# Patient Record
Sex: FEMALE | Race: WHITE | Employment: FULL TIME | URBAN - METROPOLITAN AREA
[De-identification: names, ages, dates, MRNs, and addresses within clinical notes are randomized per-mention and may not be internally consistent; named-entity substitution may affect disease eponyms.]

---

## 2017-03-24 ENCOUNTER — OFFICE VISIT (OUTPATIENT)
Dept: INTERNAL MEDICINE CLINIC | Facility: CLINIC | Age: 37
End: 2017-03-24

## 2017-03-24 VITALS
WEIGHT: 213 LBS | HEIGHT: 64 IN | SYSTOLIC BLOOD PRESSURE: 110 MMHG | DIASTOLIC BLOOD PRESSURE: 80 MMHG | HEART RATE: 82 BPM | TEMPERATURE: 97 F | BODY MASS INDEX: 36.37 KG/M2 | OXYGEN SATURATION: 98 %

## 2017-03-24 DIAGNOSIS — B02.9 HERPES ZOSTER WITHOUT COMPLICATION: Primary | ICD-10-CM

## 2017-03-24 PROCEDURE — 99213 OFFICE O/P EST LOW 20 MIN: CPT | Performed by: PHYSICIAN ASSISTANT

## 2017-03-24 RX ORDER — ACYCLOVIR 800 MG/1
800 TABLET ORAL 2 TIMES DAILY
Qty: 28 TABLET | Refills: 0 | Status: SHIPPED | OUTPATIENT
Start: 2017-03-24 | End: 2017-04-07

## 2017-03-24 NOTE — PROGRESS NOTES
Ave Mazariegos is a 39year old female  Patient presents with:  Rash      HPI:   Pt states that yesterday her  noticed rash on left side, bra line  - states after noticing it became itchy, tender with palpation; otherwise not sore  - denies  ibuprofen for pain  - advised to keep lesions clean covered, continue to monitor  - to call if symptoms change, worsen or any questions/concerns arise. No orders of the defined types were placed in this encounter.        Meds & Refills for this Visit:  Si

## 2017-04-10 ENCOUNTER — OFFICE VISIT (OUTPATIENT)
Dept: INTERNAL MEDICINE CLINIC | Facility: CLINIC | Age: 37
End: 2017-04-10

## 2017-04-10 VITALS
RESPIRATION RATE: 16 BRPM | HEART RATE: 68 BPM | BODY MASS INDEX: 36.88 KG/M2 | DIASTOLIC BLOOD PRESSURE: 64 MMHG | HEIGHT: 64 IN | WEIGHT: 216 LBS | SYSTOLIC BLOOD PRESSURE: 118 MMHG

## 2017-04-10 DIAGNOSIS — B02.9 HERPES ZOSTER WITHOUT COMPLICATION: Primary | ICD-10-CM

## 2017-04-10 PROCEDURE — 99213 OFFICE O/P EST LOW 20 MIN: CPT | Performed by: PHYSICIAN ASSISTANT

## 2017-04-10 NOTE — PROGRESS NOTES
Manuela Client is a 39year old female  Patient presents with:   Follow - Up: shingles      HPI:   Pt here today for f/u, recently diagnosed and treated for shingles  - states she has completed therapy and rash has dried up; denies pain, drainage, den Consults:  None    No Follow-up on file. There are no Patient Instructions on file for this visit. The patient indicates understanding of these issues and agrees to the plan.

## 2017-05-22 ENCOUNTER — TELEPHONE (OUTPATIENT)
Dept: INTERNAL MEDICINE CLINIC | Facility: CLINIC | Age: 37
End: 2017-05-22

## 2017-05-22 NOTE — TELEPHONE ENCOUNTER
Left msg on cell of recommendation to make office visit and phone number given. No other details given on message. Please follow up that she has done this.

## 2017-05-22 NOTE — TELEPHONE ENCOUNTER
Patient phoned and would like a call back re getting started on some anti-anxiety medication.   She states that when she last saw Teto Villa in follow-up to Shingles, which she states was thought to be stress-related, Krystal mentioned getting started on some anti

## 2017-05-22 NOTE — TELEPHONE ENCOUNTER
Spoke with pt, extremely anxious. Worried about losing her house. Feels nauseous all the time, cannot sleep, feels jittery, not hungry, when she drinks anything feels \"like acid\", headaches.  When asked if she had any thoughts of harming herself, she vincenzo

## 2017-05-23 NOTE — TELEPHONE ENCOUNTER
Haroldo for pt following up. She has not yet made an OV to discuss her anxiety symptoms. Strongly advise she come into office to discuss. Please follow up.

## 2017-05-24 NOTE — TELEPHONE ENCOUNTER
Spoke with pt. Scheduled appt 6/2/17 for further evaluation of symptoms. Pt states that she cannot come in any sooner due to work schedule.

## 2017-05-24 NOTE — TELEPHONE ENCOUNTER
Left message on pt's mobile voice mail stating that pt strongly advised to make appt for symptoms. Pt asked to call back to make appt.

## 2017-06-02 PROBLEM — F41.9 ANXIETY: Status: ACTIVE | Noted: 2017-06-02

## 2017-06-02 NOTE — PROGRESS NOTES
Percy Escalante is a 39year old female  Patient presents with:   Anxiety      HPI:   Pt here today to discuss anxiety    - pt states she is starting to get really stressed out and getting in the way of her daily life  - things that stress her out she 64\"  Wt 216 lb  BMI 37.06 kg/m2  SpO2 99%  LMP 05/02/2017  GENERAL: well developed, well nourished,in no apparent distress  SKIN: no rashes,no suspicious lesions  HEENT: atraumatic, normocephalic,ears and throat are clear, no pallor or icterus  NECK: supp

## 2017-06-11 ENCOUNTER — OFFICE VISIT (OUTPATIENT)
Dept: FAMILY MEDICINE CLINIC | Facility: CLINIC | Age: 37
End: 2017-06-11

## 2017-06-11 VITALS
HEIGHT: 66 IN | WEIGHT: 216 LBS | TEMPERATURE: 98 F | RESPIRATION RATE: 20 BRPM | HEART RATE: 65 BPM | SYSTOLIC BLOOD PRESSURE: 118 MMHG | OXYGEN SATURATION: 99 % | DIASTOLIC BLOOD PRESSURE: 84 MMHG | BODY MASS INDEX: 34.72 KG/M2
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
## 2017-06-11 DIAGNOSIS — S40.862A TICK BITE OF UPPER ARM, LEFT, INITIAL ENCOUNTER: Primary | ICD-10-CM

## 2017-06-11 DIAGNOSIS — L03.114 CELLULITIS OF LEFT UPPER EXTREMITY: ICD-10-CM

## 2017-06-11 PROCEDURE — 99213 OFFICE O/P EST LOW 20 MIN: CPT | Performed by: NURSE PRACTITIONER

## 2017-06-11 RX ORDER — DOXYCYCLINE HYCLATE 100 MG/1
100 CAPSULE ORAL 2 TIMES DAILY
Qty: 20 CAPSULE | Refills: 0 | Status: SHIPPED | OUTPATIENT
Start: 2017-06-11 | End: 2017-06-21

## 2017-06-11 NOTE — PROGRESS NOTES
CHIEF COMPLAINT:   Patient presents with:  Tick: s/s for 2 days, pt removed 1 tick from site  Redness: 2 bites at Left upper arm  Swelling       HPI:   Alex Pineda is a 39year old female who presents for evaluation of tick bite sites.   Per patien Alcohol Use: Yes           0.0 - 0.5 oz/week       0-1 Standard drinks or equivalent per week       Comment: 1-2 drinks every 2 weeks        REVIEW OF SYSTEMS:   GENERAL: feels well otherwise, no fever, no chills. SKIN: Per HPI. No edema.  No ulcerations PLAN: Meds as listed below. Comfort measures as described in Patient Instructions. Skin care discussed with patient.      Meds & Refills for this Visit:    Signed Prescriptions Disp Refills    Doxycycline Hyclate 100 MG Oral Cap 20 capsule 0      Sig: Lg · Your doctor may prescribe antibiotics to reduce your risk of getting Lyme disease. It is very important that you take them exactly as directed until they are completely finished. Follow-up care  Follow up with your healthcare provider, or as advised.   C Not all ticks carry disease. And a tick must remain attached for at least 24 hours to infect you. If you find a tick, don't panic. Try to carefully remove it with tweezers. Grasp the insect near its head and pull without twisting.  If you can't easily dislo

## 2017-07-05 NOTE — TELEPHONE ENCOUNTER
Last OV pertinent to medication: 6/2/2017  Last refill date: 6/2/2017     #/refills: 30/0  When pt was asked to return for OV: 4 weeks  Upcoming appt/reason: 7/7/2017 - PE

## 2017-07-07 ENCOUNTER — OFFICE VISIT (OUTPATIENT)
Dept: INTERNAL MEDICINE CLINIC | Facility: CLINIC | Age: 37
End: 2017-07-07

## 2017-07-07 VITALS
HEART RATE: 81 BPM | RESPIRATION RATE: 12 BRPM | DIASTOLIC BLOOD PRESSURE: 70 MMHG | HEIGHT: 64.5 IN | OXYGEN SATURATION: 98 % | WEIGHT: 218 LBS | SYSTOLIC BLOOD PRESSURE: 100 MMHG | BODY MASS INDEX: 36.76 KG/M2

## 2017-07-07 DIAGNOSIS — F41.9 ANXIETY: ICD-10-CM

## 2017-07-07 DIAGNOSIS — Z00.00 ANNUAL PHYSICAL EXAM: Primary | ICD-10-CM

## 2017-07-07 PROCEDURE — 99395 PREV VISIT EST AGE 18-39: CPT | Performed by: PHYSICIAN ASSISTANT

## 2017-07-07 RX ORDER — ALPRAZOLAM 0.25 MG/1
TABLET ORAL
Qty: 30 TABLET | Refills: 2 | Status: SHIPPED | OUTPATIENT
Start: 2017-07-07 | End: 2018-03-12

## 2017-07-07 NOTE — PROGRESS NOTES
Juvenal Muniz is a 39year old female who presents for a complete physical exam.   HPI:   Juvenal Muniz is a 39year old female who presents for a complete physical exam.     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   Exercise:active  Last colonoscopy none Last Pap Smear 2011 Last Mammogram 2011      REVIEW OF SYSTEMS:   GENERAL: feels well otherwise, marco visit.    Latest known visit with results is:   Office Visit on 03/03/2015   Component Date Value Ref Range Status   • STREP GRP A CUL-SCR 03/03/2015 negative  Negative Final   • Control Line Present with a clear * 03/03/2015 yes  Yes/No Final   • Kit Lot #

## 2017-08-09 ENCOUNTER — OFFICE VISIT (OUTPATIENT)
Dept: FAMILY MEDICINE CLINIC | Facility: CLINIC | Age: 37
End: 2017-08-09

## 2017-08-09 VITALS
TEMPERATURE: 98 F | HEIGHT: 64 IN | DIASTOLIC BLOOD PRESSURE: 84 MMHG | HEART RATE: 82 BPM | RESPIRATION RATE: 16 BRPM | SYSTOLIC BLOOD PRESSURE: 124 MMHG | WEIGHT: 214 LBS | BODY MASS INDEX: 36.54 KG/M2

## 2017-08-09 DIAGNOSIS — R20.2 PARESTHESIA OF BOTH LOWER EXTREMITIES: ICD-10-CM

## 2017-08-09 DIAGNOSIS — G89.29 CHRONIC BILATERAL LOW BACK PAIN WITH RIGHT-SIDED SCIATICA: Primary | ICD-10-CM

## 2017-08-09 DIAGNOSIS — M54.16 LUMBAR RADICULOPATHY: ICD-10-CM

## 2017-08-09 DIAGNOSIS — R29.2 DECREASED RIGHT PATELLAR REFLEX: ICD-10-CM

## 2017-08-09 DIAGNOSIS — M54.41 CHRONIC BILATERAL LOW BACK PAIN WITH RIGHT-SIDED SCIATICA: Primary | ICD-10-CM

## 2017-08-09 PROCEDURE — 99213 OFFICE O/P EST LOW 20 MIN: CPT | Performed by: PHYSICIAN ASSISTANT

## 2017-08-09 RX ORDER — PREDNISONE 20 MG/1
TABLET ORAL
Qty: 10 TABLET | Refills: 0 | Status: SHIPPED | OUTPATIENT
Start: 2017-08-09 | End: 2017-09-06 | Stop reason: ALTCHOICE

## 2017-08-09 RX ORDER — CYCLOBENZAPRINE HCL 5 MG
5 TABLET ORAL NIGHTLY
Qty: 10 TABLET | Refills: 0 | Status: SHIPPED | OUTPATIENT
Start: 2017-08-09 | End: 2017-09-06 | Stop reason: ALTCHOICE

## 2017-08-09 NOTE — PROGRESS NOTES
HPI:   May Urena is a 39year old female here for complaints of back pain. She is a new patient to the practice. Pain is located at low back, right worse than left. Pain is described as sharp, feels like a pinched nerve. Severity is moderate.  The pain r teacher.      REVIEW OF SYSTEMS:   GENERAL: feels well otherwise  SKIN: denies rash  LUNGS: denies shortness of breath or cough  CV: denies chest pain or syncopal episodes  GI: denies heartburn, abdominal pain, or change in bowel habits  : denies dysuria, consult. - predniSONE 20 MG Oral Tab; Take 2 tablets daily with food for 5 days. Dispense: 10 tablet; Refill: 0  - Cyclobenzaprine HCl 5 MG Oral Tab; Take 1 tablet (5 mg total) by mouth nightly. Do not take with alprazolam.  Dispense: 10 tablet;  Refill:

## 2017-08-14 ENCOUNTER — TELEPHONE (OUTPATIENT)
Dept: FAMILY MEDICINE CLINIC | Facility: CLINIC | Age: 37
End: 2017-08-14

## 2017-08-14 RX ORDER — NAPROXEN 500 MG/1
500 TABLET ORAL 2 TIMES DAILY WITH MEALS
Qty: 30 TABLET | Refills: 0 | Status: SHIPPED | OUTPATIENT
Start: 2017-08-14 | End: 2017-09-06 | Stop reason: ALTCHOICE

## 2017-08-14 NOTE — TELEPHONE ENCOUNTER
Pt called to request a refill for her prednisone medication, pt ran out she only got it for 5 days, pt is supposed to have an MRI but is not approved yet, so pt needs more medication. Do you want to continue prednisone?

## 2017-08-14 NOTE — TELEPHONE ENCOUNTER
Pt called to request a refill for her prednisone medication, pt ran out she only got it for 5 days, pt is supposed to have an MRI but is not approved yet, so pt needs more medication. Please call pt and advise.

## 2017-08-14 NOTE — TELEPHONE ENCOUNTER
No further steroids. Was only to take for 5 days. Pt may take naproxen 500 mg twice daily with food. Will send rx to her pharmacy.

## 2017-08-16 ENCOUNTER — HOSPITAL ENCOUNTER (OUTPATIENT)
Dept: MRI IMAGING | Facility: HOSPITAL | Age: 37
Discharge: HOME | End: 2017-08-16
Attending: PHYSICIAN ASSISTANT
Payer: COMMERCIAL

## 2017-08-16 DIAGNOSIS — M54.16 LUMBAR RADICULOPATHY: ICD-10-CM

## 2017-08-16 DIAGNOSIS — G89.29 CHRONIC BILATERAL LOW BACK PAIN WITH RIGHT-SIDED SCIATICA: ICD-10-CM

## 2017-08-16 DIAGNOSIS — R20.2 PARESTHESIA OF BOTH LOWER EXTREMITIES: ICD-10-CM

## 2017-08-16 DIAGNOSIS — R29.2 DECREASED RIGHT PATELLAR REFLEX: ICD-10-CM

## 2017-08-16 DIAGNOSIS — M54.41 CHRONIC BILATERAL LOW BACK PAIN WITH RIGHT-SIDED SCIATICA: ICD-10-CM

## 2017-08-16 PROCEDURE — 72148 MRI LUMBAR SPINE W/O DYE: CPT | Performed by: PHYSICIAN ASSISTANT

## 2017-08-29 ENCOUNTER — HOSPITAL ENCOUNTER (OUTPATIENT)
Dept: NUCLEAR MEDICINE | Facility: HOSPITAL | Age: 37
Discharge: HOME | End: 2017-08-29
Attending: PHYSICIAN ASSISTANT
Payer: COMMERCIAL

## 2017-08-29 DIAGNOSIS — R93.89 ABNORMAL MRI: ICD-10-CM

## 2017-08-29 PROCEDURE — 78399 UNLISTED MUSCSKEL PX DX NUC: CPT | Performed by: PHYSICIAN ASSISTANT

## 2017-08-29 PROCEDURE — 78320 NM BONE SPECT WITH CT (CPT=78306/78320/78399): CPT | Performed by: PHYSICIAN ASSISTANT

## 2017-08-29 PROCEDURE — 78306 BONE IMAGING WHOLE BODY: CPT | Performed by: PHYSICIAN ASSISTANT

## 2017-09-06 ENCOUNTER — TELEPHONE (OUTPATIENT)
Dept: SURGERY | Facility: CLINIC | Age: 37
End: 2017-09-06

## 2017-09-06 ENCOUNTER — OFFICE VISIT (OUTPATIENT)
Dept: SURGERY | Facility: CLINIC | Age: 37
End: 2017-09-06

## 2017-09-06 VITALS
RESPIRATION RATE: 16 BRPM | HEIGHT: 64 IN | WEIGHT: 215 LBS | SYSTOLIC BLOOD PRESSURE: 126 MMHG | BODY MASS INDEX: 36.7 KG/M2 | HEART RATE: 88 BPM | DIASTOLIC BLOOD PRESSURE: 84 MMHG

## 2017-09-06 DIAGNOSIS — M46.1 SACROILIITIS (HCC): ICD-10-CM

## 2017-09-06 DIAGNOSIS — M51.36 DDD (DEGENERATIVE DISC DISEASE), LUMBAR: Primary | ICD-10-CM

## 2017-09-06 DIAGNOSIS — M54.16 LUMBAR RADICULOPATHY: ICD-10-CM

## 2017-09-06 PROCEDURE — 99204 OFFICE O/P NEW MOD 45 MIN: CPT | Performed by: NURSE PRACTITIONER

## 2017-09-06 NOTE — PROGRESS NOTES
Location of Pain: Lower back pain with radiation down bilateral legs. Leg pain is posterior and lateral to knees only. N/T to both legs into feet with walking.  No B/B dysfunction, no weakness noted    Date Pain Began: Chronic, getting progressively worse

## 2017-09-06 NOTE — PATIENT INSTRUCTIONS
Refill policies:    • Allow 2-3 business days for refills; controlled substances may take longer.   • Contact your pharmacy at least 5 days prior to running out of medication and have them send an electronic request or submit request through the [facility] have a procedure or additional testing performed. [facility]) will contact your insurance carrier to obtain pre-certification or prior authorization.     Unfortunately, [facility] has seen an increase in denial of payment even though the p

## 2017-09-06 NOTE — H&P
NEUROSURGERY CLINIC VISIT      [address] [place] [address]  10/14/1980      Patient presents with:  Low Back Pain        HPI:   Hazel Guillaume is a 39year old female PMH anxiety, neck pain, RLS ALPRAZOLAM 0.25 MG Oral Tab TAKE 1 TABLET BY MOUTH EVERY 6 HOURS AS NEEDED FOR SLEEP OR ANXIETY Disp: 30 tablet Rfl: 2   SERTRALINE HCL 50 MG Oral Tab TAKE 1 TABLET(50 MG) BY MOUTH DAILY Disp: 90 tablet Rfl: 0     Family History   Problem Relation Age of O MRI lumbar spine 8/16/17  1. Mild degenerative changes in the lumbar spine are most pronounced at the L4-5 level. There is no significant spinal canal stenosis at any level the lumbar spine.      2. Mild right neural foraminal stenosis at L4-5.     3. Indet

## 2017-09-07 ENCOUNTER — TELEPHONE (OUTPATIENT)
Dept: SURGERY | Facility: CLINIC | Age: 37
End: 2017-09-07

## 2017-09-07 NOTE — TELEPHONE ENCOUNTER
Plan per LOV 9/6/17 with DANA Glover. Follow up 6 weeks  2. Start physical therapy   3. Consider referral to pain management if not improve with physical therapy  4. Consider EMG\"    LM on personalized VM informing patient of above plan.  Pain r

## 2017-09-12 ENCOUNTER — TELEPHONE (OUTPATIENT)
Dept: SURGERY | Facility: CLINIC | Age: 37
End: 2017-09-12

## 2017-09-12 DIAGNOSIS — M54.16 LUMBAR RADICULOPATHY: ICD-10-CM

## 2017-09-12 DIAGNOSIS — M51.36 DDD (DEGENERATIVE DISC DISEASE), LUMBAR: Primary | ICD-10-CM

## 2017-09-25 ENCOUNTER — HOSPITAL ENCOUNTER (OUTPATIENT)
Dept: PHYSICAL THERAPY | Facility: HOSPITAL | Age: 37
Setting detail: THERAPIES SERIES
Discharge: HOME | End: 2017-09-25
Attending: NURSE PRACTITIONER
Payer: COMMERCIAL

## 2017-09-25 DIAGNOSIS — M54.16 LUMBAR RADICULOPATHY: ICD-10-CM

## 2017-09-25 DIAGNOSIS — M51.36 DDD (DEGENERATIVE DISC DISEASE), LUMBAR: ICD-10-CM

## 2017-09-25 PROCEDURE — 97110 THERAPEUTIC EXERCISES: CPT

## 2017-09-25 PROCEDURE — 97162 PT EVAL MOD COMPLEX 30 MIN: CPT

## 2017-09-25 NOTE — PROGRESS NOTES
SPINE EVALUATION:   Referring Physician: Dr. Ailyn Bueno  Diagnosis: DDD     Date of Service: 9/25/2017     PATIENT SUMMARY   Jolie Diaz is a 39year old y/o female who presents to therapy today with complaints of LBP.  About 5 years ago, had first in symmetrically, +2 B LE's DTR's    Lumbar ROM:   Flexion: 130 - vaelriano's sign  Extension: 30 -  harder  Sidebending: R 35; L 35  Rotation: R 60; L 60    Accessory motion: decreased CPA L4-5 with pain  Palpation: tenderness R SI joint    Strength:   LE   Hip minutes for work and home activities (8 visits)  · Pt will demonstrate improved core strength to be able to perform lifting child with <2/10 pain and maintain pelvic symmetry (8 visits)  · Pt will be independent and compliant with comprehensive HEP to main

## 2017-10-02 ENCOUNTER — HOSPITAL ENCOUNTER (OUTPATIENT)
Dept: PHYSICAL THERAPY | Facility: HOSPITAL | Age: 37
Setting detail: THERAPIES SERIES
Discharge: HOME | End: 2017-10-02
Attending: NURSE PRACTITIONER
Payer: COMMERCIAL

## 2017-10-02 DIAGNOSIS — M51.36 DDD (DEGENERATIVE DISC DISEASE), LUMBAR: ICD-10-CM

## 2017-10-02 DIAGNOSIS — M54.16 LUMBAR RADICULOPATHY: ICD-10-CM

## 2017-10-02 PROCEDURE — 97110 THERAPEUTIC EXERCISES: CPT

## 2017-10-02 PROCEDURE — 97140 MANUAL THERAPY 1/> REGIONS: CPT

## 2017-10-02 NOTE — PROGRESS NOTES
Dx: DDD lumbar spine         Authorized # of Visits:  8         Next MD visit: none scheduled  Fall Risk: standard         Precautions: n/a             Subjective: Pain 2/10. Doing HEP. Objective:     Date: 10/2/2017  Tx#: 2/8 Date: Tx#: 3/ Date:    Tx

## 2017-10-04 ENCOUNTER — TELEPHONE (OUTPATIENT)
Dept: SURGERY | Facility: CLINIC | Age: 37
End: 2017-10-04

## 2017-10-09 ENCOUNTER — HOSPITAL ENCOUNTER (OUTPATIENT)
Dept: PHYSICAL THERAPY | Facility: HOSPITAL | Age: 37
Setting detail: THERAPIES SERIES
Discharge: HOME | End: 2017-10-09
Attending: NURSE PRACTITIONER
Payer: COMMERCIAL

## 2017-10-09 DIAGNOSIS — M54.16 LUMBAR RADICULOPATHY: ICD-10-CM

## 2017-10-09 DIAGNOSIS — M51.36 DDD (DEGENERATIVE DISC DISEASE), LUMBAR: ICD-10-CM

## 2017-10-09 PROCEDURE — 97110 THERAPEUTIC EXERCISES: CPT

## 2017-10-09 PROCEDURE — 97140 MANUAL THERAPY 1/> REGIONS: CPT

## 2017-10-09 NOTE — PROGRESS NOTES
Dx: DDD lumbar spine         Authorized # of Visits:  8         Next MD visit: none scheduled  Fall Risk: standard         Precautions: n/a             Subjective: Pain 2/10.  Did a lot over the weekend-stood in long period of time, laundry-carrying vacuum

## 2017-10-16 ENCOUNTER — HOSPITAL ENCOUNTER (OUTPATIENT)
Dept: PHYSICAL THERAPY | Facility: HOSPITAL | Age: 37
Setting detail: THERAPIES SERIES
Discharge: HOME | End: 2017-10-16
Attending: NURSE PRACTITIONER
Payer: COMMERCIAL

## 2017-10-16 DIAGNOSIS — M51.36 DDD (DEGENERATIVE DISC DISEASE), LUMBAR: ICD-10-CM

## 2017-10-16 DIAGNOSIS — M54.16 LUMBAR RADICULOPATHY: ICD-10-CM

## 2017-10-16 PROCEDURE — 97140 MANUAL THERAPY 1/> REGIONS: CPT

## 2017-10-16 PROCEDURE — 97110 THERAPEUTIC EXERCISES: CPT

## 2017-10-16 NOTE — PROGRESS NOTES
Dx: DDD lumbar spine         Authorized # of Visits:  8         Next MD visit: none scheduled  Fall Risk: standard         Precautions: n/a             Subjective: Pain 4/10. Back felt like it was going to go out 2x over the past week.     Objective:   10/1

## 2017-10-18 ENCOUNTER — OFFICE VISIT (OUTPATIENT)
Dept: SURGERY | Facility: CLINIC | Age: 37
End: 2017-10-18

## 2017-10-18 VITALS — SYSTOLIC BLOOD PRESSURE: 118 MMHG | DIASTOLIC BLOOD PRESSURE: 80 MMHG | HEART RATE: 100 BPM

## 2017-10-18 DIAGNOSIS — M54.16 LUMBAR RADICULOPATHY: Primary | ICD-10-CM

## 2017-10-18 DIAGNOSIS — M51.16 LUMBAR DISC HERNIATION WITH RADICULOPATHY: ICD-10-CM

## 2017-10-18 DIAGNOSIS — M53.3 SI (SACROILIAC) PAIN: ICD-10-CM

## 2017-10-18 PROCEDURE — 99213 OFFICE O/P EST LOW 20 MIN: CPT | Performed by: PHYSICIAN ASSISTANT

## 2017-10-18 RX ORDER — HYDROCODONE BITARTRATE AND ACETAMINOPHEN 5; 325 MG/1; MG/1
1 TABLET ORAL EVERY 8 HOURS PRN
Qty: 45 TABLET | Refills: 0 | Status: SHIPPED | OUTPATIENT
Start: 2017-10-18 | End: 2019-11-26

## 2017-10-18 NOTE — PROGRESS NOTES
Pt is here for follow up regarding lumbar pain, currently 3/10, has had PT which has helped, but not significantly. Pain has been fluctuating from 3-10/10.

## 2017-10-18 NOTE — PATIENT INSTRUCTIONS
Refill policies:    • Allow 2-3 business days for refills; controlled substances may take longer.   • Contact your pharmacy at least 5 days prior to running out of medication and have them send an electronic request or submit request through the [facility] have a procedure or additional testing performed. [facility]) will contact your insurance carrier to obtain pre-certification or prior authorization.     Unfortunately, [facility] has seen an increase in denial of payment even though the p

## 2017-10-18 NOTE — PROGRESS NOTES
NEUROSURGERY CLINIC VISIT     Carmela Ledezma  10/14/1980     HPI:   Risa Barragan is a 39year old female here in follow-up for back pain. She denies any leg pain. Denies any numbness and tingling in the legs.   She has back pain on and off f • RLS (restless legs syndrome)     • Screening, lipid 11/10/2011   • Sinusitis     • [facility]       daytime      Past Surgical History:  No date: ORAL SURGERY PROCEDURE      Comment: wisdom tooth resection  4//2013: OTHER      Comment: Shira Wise Left          5        5         5          5 5  5 5          Lower extremity strength:      Iliopsoas  Hamstrings   Quads    D-flexion P-flexion Great Toe   Right       5         5       5         5 5 5   Left       5         5       5         5 5 5

## 2017-10-23 ENCOUNTER — HOSPITAL ENCOUNTER (OUTPATIENT)
Dept: PHYSICAL THERAPY | Facility: HOSPITAL | Age: 37
Setting detail: THERAPIES SERIES
Discharge: HOME | End: 2017-10-23
Attending: NURSE PRACTITIONER
Payer: COMMERCIAL

## 2017-10-23 DIAGNOSIS — M51.36 DDD (DEGENERATIVE DISC DISEASE), LUMBAR: ICD-10-CM

## 2017-10-23 DIAGNOSIS — M54.16 LUMBAR RADICULOPATHY: ICD-10-CM

## 2017-10-23 PROCEDURE — 97140 MANUAL THERAPY 1/> REGIONS: CPT

## 2017-10-23 PROCEDURE — 97110 THERAPEUTIC EXERCISES: CPT

## 2017-10-23 NOTE — PROGRESS NOTES
Dx: DDD lumbar spine         Authorized # of Visits:  8         Next MD visit: none scheduled  Fall Risk: standard         Precautions: n/a             Subjective: Pain 4/10. Therapy is helping but still has constant low grade pain sleeps prone.     Abelardo Blackman bracing  Charges: Valorie 2, MT   Total Timed Treatment: 45 min     Total Treatment Time: 45 min

## 2017-10-24 RX ORDER — ALPRAZOLAM 0.25 MG/1
TABLET ORAL
Qty: 30 TABLET | Refills: 0 | OUTPATIENT
Start: 2017-10-24

## 2017-10-26 ENCOUNTER — OFFICE VISIT (OUTPATIENT)
Dept: SURGERY | Facility: CLINIC | Age: 37
End: 2017-10-26

## 2017-10-26 VITALS
WEIGHT: 215 LBS | HEIGHT: 64 IN | HEART RATE: 77 BPM | RESPIRATION RATE: 16 BRPM | OXYGEN SATURATION: 99 % | BODY MASS INDEX: 36.7 KG/M2

## 2017-10-26 DIAGNOSIS — M54.16 LUMBAR RADICULOPATHY: Primary | ICD-10-CM

## 2017-10-26 PROCEDURE — 99204 OFFICE O/P NEW MOD 45 MIN: CPT | Performed by: ANESTHESIOLOGY

## 2017-10-26 RX ORDER — GABAPENTIN 100 MG/1
100 CAPSULE ORAL 3 TIMES DAILY
Qty: 90 CAPSULE | Refills: 3 | Status: SHIPPED | OUTPATIENT
Start: 2017-10-26 | End: 2019-11-14

## 2017-10-26 NOTE — PATIENT INSTRUCTIONS
Refill policies:    • Allow 2-3 business days for refills; controlled substances may take longer.   • Contact your pharmacy at least 5 days prior to running out of medication and have them send an electronic request or submit request through the [facility] have a procedure or additional testing performed. [facility]) will contact your insurance carrier to obtain pre-certification or prior authorization.     Unfortunately, [facility] has seen an increase in denial of payment even though the p [place]. • Please note-No prescriptions will be written by Pain Clinic in [place] on the day of procedure. If you require a refill of medications, please contact the office 48 hours prior to your procedure.   • If you have an implanted Spinal Cord or Periph up visit within 6 to 10 weeks after your last procedure date   Please call our office with any questions or concerns before or after your procedure at [phone].   If you are a diabetic, please increase the frequency of your glucose monitoring after the tolerate. Will I be \"put out\" for the transforaminal injection? This procedure is done with a local anesthetic. Some patients choose to receive intravenous sedation that can make the procedure easier to tolerate.  Patients may experience some amnesia f How many transforaminal injections do I need to have? If the first transforaminal injection does not relieve your symptoms within one week, a second injection may be recommended.  Similarly, if the second transforaminal injection does not completely reli

## 2017-10-26 NOTE — H&P
Name: Pamela Perdue   : 10/14/1980   DOS: 10/26/2017     Chief complaint: Low back pain    History of present illness:  Pamela Perdue is a 40year old female complaining of a 5 year history of low back pain.   Her pain is primarily in the a 1 TABLET(50 MG) BY MOUTH DAILY Disp: 90 tablet Rfl: 0     Past Surgical History:  No date: ORAL SURGERY PROCEDURE      Comment: wisdom tooth resection  4//2013: OTHER      Comment: d&C   Family History   Problem Relation Age of Onset   • Hypertension Ailyne diagnostic studies:     Her MRI was reviewed independently with evidence of L4-5 neuroforaminal stenosis on the right. Does have some evidence of facet arthropathy. Assessment:  Lumbar radiculopathy  (primary encounter diagnosis).       Plan:     The pa

## 2017-10-30 ENCOUNTER — HOSPITAL ENCOUNTER (OUTPATIENT)
Dept: PHYSICAL THERAPY | Facility: HOSPITAL | Age: 37
Setting detail: THERAPIES SERIES
Discharge: HOME | End: 2017-10-30
Attending: NURSE PRACTITIONER
Payer: COMMERCIAL

## 2017-10-30 DIAGNOSIS — M54.16 LUMBAR RADICULOPATHY: ICD-10-CM

## 2017-10-30 DIAGNOSIS — M51.36 DDD (DEGENERATIVE DISC DISEASE), LUMBAR: ICD-10-CM

## 2017-10-30 PROCEDURE — 97140 MANUAL THERAPY 1/> REGIONS: CPT

## 2017-10-30 PROCEDURE — 97110 THERAPEUTIC EXERCISES: CPT

## 2017-10-30 NOTE — PROGRESS NOTES
Dx: DDD lumbar spine         Authorized # of Visits:  8         Next MD visit: none scheduled  Fall Risk: standard         Precautions: n/a             Subjective: Pain 4/10. Doing muscle energy technique  at home which helps. Feeling stronger.     Objectiv abdominal brace w ball  10 sec  x10    LTR x10       Assessment: Decreased palpable R SI pain with symmetrical ilium after muscle energy technique . Focused on core strengthening with progression.       Plan: Next visit: [facility] w abdominal bracing    Charges:

## 2017-11-01 ENCOUNTER — TELEPHONE (OUTPATIENT)
Dept: SURGERY | Facility: CLINIC | Age: 37
End: 2017-11-01

## 2017-11-01 NOTE — TELEPHONE ENCOUNTER
[facility] referral #[id number] approved for codes [id number] x 3 , 93531 x 3  From 10/26/2017 - 02/01/2018

## 2017-11-01 NOTE — TELEPHONE ENCOUNTER
LM confirming upcoming procedure date 11/6/17 and arrival time 7:15am and review of instructions. Encouraged pt to call office back with any questions. Office number provided.        [address]  PRE-PROCEDURE INSTRUCTIONS WITH IV SEDATION ? 81mg 24 hours  ? Greater than 81 mg (325mg) 7 days  ? Coumadin Procedure may be cancelled if INR is elevated. ? Epidural ____ - 7 days  ? Others 5 days  ? Excedrin (with aspirin) 7 days  ? Plavix (Clopidogrel)  ? Epidural ____ - 10 days  ?  Others 7 day

## 2017-11-02 ENCOUNTER — OFFICE VISIT (OUTPATIENT)
Dept: FAMILY MEDICINE CLINIC | Facility: CLINIC | Age: 37
End: 2017-11-02

## 2017-11-02 VITALS
WEIGHT: 228 LBS | TEMPERATURE: 98 F | DIASTOLIC BLOOD PRESSURE: 76 MMHG | HEART RATE: 90 BPM | BODY MASS INDEX: 38.93 KG/M2 | RESPIRATION RATE: 16 BRPM | HEIGHT: 64 IN | SYSTOLIC BLOOD PRESSURE: 114 MMHG

## 2017-11-02 DIAGNOSIS — J02.9 SORE THROAT: Primary | ICD-10-CM

## 2017-11-02 DIAGNOSIS — R09.82 POST-NASAL DRIP: ICD-10-CM

## 2017-11-02 PROCEDURE — 87880 STREP A ASSAY W/OPTIC: CPT | Performed by: PHYSICIAN ASSISTANT

## 2017-11-02 PROCEDURE — 99213 OFFICE O/P EST LOW 20 MIN: CPT | Performed by: PHYSICIAN ASSISTANT

## 2017-11-02 NOTE — PROGRESS NOTES
HPI:   Eleanor Gardner is a 40year old female who presents for cold symptoms for  3  days. Patient reports congestion, dry cough, post nasal drip, ears feel clogged, denies fever, denies sinus pain.  Had sore throat the day before Halloween but that h /76   Pulse 90   Temp 98.4 °F (36.9 °C)   Resp 16   Ht 64\"   Wt 228 lb   LMP 10/23/2017   BMI 39.14 kg/m²   GENERAL: well developed and in no apparent distress  SKIN: warm & dry, no rash  EYES:PERRLA, conjunctiva are clear  HEENT: atraumatic, norm

## 2017-11-06 ENCOUNTER — APPOINTMENT (OUTPATIENT)
Dept: PHYSICAL THERAPY | Facility: HOSPITAL | Age: 37
End: 2017-11-06
Attending: NURSE PRACTITIONER
Payer: COMMERCIAL

## 2017-11-06 ENCOUNTER — HOSPITAL ENCOUNTER (OUTPATIENT)
Facility: HOSPITAL | Age: 37
Setting detail: HOSPITAL OUTPATIENT SURGERY
Discharge: HOME | End: 2017-11-06
Attending: ANESTHESIOLOGY | Admitting: ANESTHESIOLOGY
Payer: COMMERCIAL

## 2017-11-06 ENCOUNTER — SURGERY (OUTPATIENT)
Age: 37
End: 2017-11-06

## 2017-11-06 ENCOUNTER — APPOINTMENT (OUTPATIENT)
Dept: GENERAL RADIOLOGY | Facility: HOSPITAL | Age: 37
End: 2017-11-06
Attending: ANESTHESIOLOGY
Payer: COMMERCIAL

## 2017-11-06 VITALS
HEART RATE: 78 BPM | DIASTOLIC BLOOD PRESSURE: 80 MMHG | RESPIRATION RATE: 18 BRPM | SYSTOLIC BLOOD PRESSURE: 130 MMHG | OXYGEN SATURATION: 99 % | TEMPERATURE: 98 F

## 2017-11-06 DIAGNOSIS — M54.16 LUMBAR RADICULOPATHY: ICD-10-CM

## 2017-11-06 PROCEDURE — 3E0R33Z INTRODUCTION OF ANTI-INFLAMMATORY INTO SPINAL CANAL, PERCUTANEOUS APPROACH: ICD-10-PCS | Performed by: ANESTHESIOLOGY

## 2017-11-06 PROCEDURE — 81025 URINE PREGNANCY TEST: CPT | Performed by: ANESTHESIOLOGY

## 2017-11-06 PROCEDURE — 99152 MOD SED SAME PHYS/QHP 5/>YRS: CPT | Performed by: ANESTHESIOLOGY

## 2017-11-06 RX ORDER — ONDANSETRON 2 MG/ML
4 INJECTION INTRAMUSCULAR; INTRAVENOUS ONCE AS NEEDED
Status: CANCELLED | OUTPATIENT
Start: 2017-11-06 | End: 2017-11-06

## 2017-11-06 RX ORDER — ACETAMINOPHEN 325 MG/1
650 TABLET ORAL EVERY 6 HOURS PRN
Status: CANCELLED | OUTPATIENT
Start: 2017-11-06

## 2017-11-06 RX ORDER — 0.9 % SODIUM CHLORIDE 0.9 %
VIAL (ML) INJECTION AS NEEDED
Status: DISCONTINUED | OUTPATIENT
Start: 2017-11-06 | End: 2017-11-06 | Stop reason: HOSPADM

## 2017-11-06 RX ORDER — DEXTROSE MONOHYDRATE 25 G/50ML
50 INJECTION, SOLUTION INTRAVENOUS
Status: DISCONTINUED | OUTPATIENT
Start: 2017-11-06 | End: 2017-11-06 | Stop reason: HOSPADM

## 2017-11-06 RX ORDER — LIDOCAINE HYDROCHLORIDE 10 MG/ML
INJECTION, SOLUTION EPIDURAL; INFILTRATION; INTRACAUDAL; PERINEURAL AS NEEDED
Status: DISCONTINUED | OUTPATIENT
Start: 2017-11-06 | End: 2017-11-06 | Stop reason: HOSPADM

## 2017-11-06 RX ORDER — SODIUM CHLORIDE, SODIUM LACTATE, POTASSIUM CHLORIDE, CALCIUM CHLORIDE 600; 310; 30; 20 MG/100ML; MG/100ML; MG/100ML; MG/100ML
100 INJECTION, SOLUTION INTRAVENOUS CONTINUOUS
Status: DISCONTINUED | OUTPATIENT
Start: 2017-11-06 | End: 2017-11-06

## 2017-11-06 RX ORDER — MIDAZOLAM HYDROCHLORIDE 1 MG/ML
INJECTION INTRAMUSCULAR; INTRAVENOUS AS NEEDED
Status: DISCONTINUED | OUTPATIENT
Start: 2017-11-06 | End: 2017-11-06 | Stop reason: HOSPADM

## 2017-11-06 RX ORDER — DEXAMETHASONE SODIUM PHOSPHATE 10 MG/ML
INJECTION, SOLUTION INTRAMUSCULAR; INTRAVENOUS AS NEEDED
Status: DISCONTINUED | OUTPATIENT
Start: 2017-11-06 | End: 2017-11-06 | Stop reason: HOSPADM

## 2017-11-06 RX ORDER — DIPHENHYDRAMINE HYDROCHLORIDE 50 MG/ML
50 INJECTION INTRAMUSCULAR; INTRAVENOUS ONCE AS NEEDED
Status: CANCELLED | OUTPATIENT
Start: 2017-11-06 | End: 2017-11-06

## 2017-11-06 NOTE — H&P
History & Physical Examination    Patient Name: Dedrick Watts  MRN: [id number]  CSN: [id number]  YOB: 1980    Pre-Operative Diagnosis:  Lumbar radiculopathy [M54.16]    Present Illness: A 40year old female with low back pain is here f [facility]     daytime     Past Surgical History:  No date: ORAL SURGERY PROCEDURE      Comment: wisdom tooth resection  4//2013: OTHER      Comment: d&C  Family History   Problem Relation Age of Onset   • Hypertension Mother    • Hypertension Maternal

## 2017-11-06 NOTE — OPERATIVE REPORT
[facility]  Operative Report  2017     Sund Sic Pretkelis Patient Status:  Hospital Outpatient Surgery    10/14/1980 MRN [id number]   [facility] SURGERY Attending Mimi Alas MD   Hosp Day # 0 PCP DO Noman Morano level. The needle position was confirmed under AP and lateral fluoroscopic view. Following negative aspiration for CSF and blood, approximately 1 cc of Omnipaque 240 was injected.   An excellent contrast spread along the epidural space and the nerve root w

## 2017-11-13 ENCOUNTER — TELEPHONE (OUTPATIENT)
Dept: SURGERY | Facility: CLINIC | Age: 37
End: 2017-11-13

## 2017-11-13 NOTE — TELEPHONE ENCOUNTER
Called patient to review the following preoperative instructions. Verbalized understanding.        [address]  PRE-PROCEDURE INSTRUCTIONS WITH IV SEDATION    Appointment Date: 11/15   Arrival Time: 12:45 PM   Procedure Time: 1:45 PM     Eli ? Lovenox (Enoxaparin) 24 hours  ? Aspirin  ? 81mg 24 hours  ? Greater than 81 mg (325mg) 7 days  ? Coumadin Procedure may be cancelled if INR is elevated. ? Epidural ____ - 7 days  ? Others 5 days  ? Excedrin (with aspirin) 7 days  ?  Plavix (Clopidogrel

## 2017-11-15 ENCOUNTER — APPOINTMENT (OUTPATIENT)
Dept: GENERAL RADIOLOGY | Facility: HOSPITAL | Age: 37
End: 2017-11-15
Attending: ANESTHESIOLOGY
Payer: COMMERCIAL

## 2017-11-15 ENCOUNTER — SURGERY (OUTPATIENT)
Age: 37
End: 2017-11-15

## 2017-11-15 ENCOUNTER — HOSPITAL ENCOUNTER (OUTPATIENT)
Facility: HOSPITAL | Age: 37
Setting detail: HOSPITAL OUTPATIENT SURGERY
Discharge: HOME | End: 2017-11-15
Attending: ANESTHESIOLOGY | Admitting: ANESTHESIOLOGY
Payer: COMMERCIAL

## 2017-11-15 VITALS
OXYGEN SATURATION: 98 % | SYSTOLIC BLOOD PRESSURE: 127 MMHG | TEMPERATURE: 98 F | RESPIRATION RATE: 18 BRPM | HEART RATE: 79 BPM | DIASTOLIC BLOOD PRESSURE: 84 MMHG

## 2017-11-15 DIAGNOSIS — M54.16 LUMBAR RADICULOPATHY: ICD-10-CM

## 2017-11-15 PROCEDURE — 3E0R33Z INTRODUCTION OF ANTI-INFLAMMATORY INTO SPINAL CANAL, PERCUTANEOUS APPROACH: ICD-10-PCS | Performed by: ANESTHESIOLOGY

## 2017-11-15 PROCEDURE — 81025 URINE PREGNANCY TEST: CPT | Performed by: ANESTHESIOLOGY

## 2017-11-15 PROCEDURE — 99152 MOD SED SAME PHYS/QHP 5/>YRS: CPT | Performed by: ANESTHESIOLOGY

## 2017-11-15 RX ORDER — LIDOCAINE HYDROCHLORIDE 10 MG/ML
INJECTION, SOLUTION INFILTRATION; PERINEURAL AS NEEDED
Status: DISCONTINUED | OUTPATIENT
Start: 2017-11-15 | End: 2017-11-15 | Stop reason: HOSPADM

## 2017-11-15 RX ORDER — DEXTROSE MONOHYDRATE 25 G/50ML
50 INJECTION, SOLUTION INTRAVENOUS
Status: DISCONTINUED | OUTPATIENT
Start: 2017-11-15 | End: 2017-11-15 | Stop reason: HOSPADM

## 2017-11-15 RX ORDER — MIDAZOLAM HYDROCHLORIDE 1 MG/ML
INJECTION INTRAMUSCULAR; INTRAVENOUS AS NEEDED
Status: DISCONTINUED | OUTPATIENT
Start: 2017-11-15 | End: 2017-11-15 | Stop reason: HOSPADM

## 2017-11-15 RX ORDER — DEXAMETHASONE SODIUM PHOSPHATE 10 MG/ML
INJECTION, SOLUTION INTRAMUSCULAR; INTRAVENOUS AS NEEDED
Status: DISCONTINUED | OUTPATIENT
Start: 2017-11-15 | End: 2017-11-15 | Stop reason: HOSPADM

## 2017-11-15 RX ORDER — SODIUM CHLORIDE, SODIUM LACTATE, POTASSIUM CHLORIDE, CALCIUM CHLORIDE 600; 310; 30; 20 MG/100ML; MG/100ML; MG/100ML; MG/100ML
100 INJECTION, SOLUTION INTRAVENOUS CONTINUOUS
Status: DISCONTINUED | OUTPATIENT
Start: 2017-11-15 | End: 2017-11-15

## 2017-11-15 RX ORDER — DIPHENHYDRAMINE HYDROCHLORIDE 50 MG/ML
50 INJECTION INTRAMUSCULAR; INTRAVENOUS ONCE AS NEEDED
Status: CANCELLED | OUTPATIENT
Start: 2017-11-15 | End: 2017-11-15

## 2017-11-15 RX ORDER — 0.9 % SODIUM CHLORIDE 0.9 %
VIAL (ML) INJECTION AS NEEDED
Status: DISCONTINUED | OUTPATIENT
Start: 2017-11-15 | End: 2017-11-15 | Stop reason: HOSPADM

## 2017-11-15 RX ORDER — ONDANSETRON 2 MG/ML
4 INJECTION INTRAMUSCULAR; INTRAVENOUS ONCE AS NEEDED
Status: CANCELLED | OUTPATIENT
Start: 2017-11-15 | End: 2017-11-15

## 2017-11-15 RX ORDER — ACETAMINOPHEN 325 MG/1
650 TABLET ORAL EVERY 6 HOURS PRN
Status: CANCELLED | OUTPATIENT
Start: 2017-11-15

## 2017-11-15 NOTE — OPERATIVE REPORT
[facility]  Operative Report  11/15/2017     Carmela Peralesvinicioroxanne Patient Status:  Hospital Outpatient Surgery    10/14/1980 MRN [id number]   [facility] SURGERY Attending Blake Weiner MD   Hosp Day # 0 PCP DO Noman Resendez this level. The needle position was confirmed under AP and lateral fluoroscopic view. Following negative aspiration for CSF and blood, approximately 1 cc of Omnipaque 240 was injected.   An excellent contrast spread along the epidural space and the nerve r

## 2017-11-15 NOTE — H&P
History & Physical Examination    Patient Name: Tamra Zamudio  MRN: [id number]  [facility]: [id number]  YOB: 1980    Pre-Operative Diagnosis:  Lumbar radiculopathy [M54.16]    Present Illness: A 40year old female with low back pain is here f [facility] 10/23/2017   SYSTEM Check if Review is Normal Check if Physical Exam is Normal If not normal, please explain:   HEENT [x ] [x ]    NECK & BACK [ ] [ ] Patient c/o pain to the low back. Pain radiates down the right leg.    HEART [x ] [x ]    LUNGS [x

## 2017-11-22 ENCOUNTER — HOSPITAL ENCOUNTER (OUTPATIENT)
Dept: PHYSICAL THERAPY | Facility: HOSPITAL | Age: 37
Setting detail: THERAPIES SERIES
Discharge: HOME | End: 2017-11-22
Attending: NURSE PRACTITIONER
Payer: COMMERCIAL

## 2017-11-22 ENCOUNTER — TELEPHONE (OUTPATIENT)
Dept: SURGERY | Facility: CLINIC | Age: 37
End: 2017-11-22

## 2017-11-22 PROCEDURE — 97112 NEUROMUSCULAR REEDUCATION: CPT

## 2017-11-22 PROCEDURE — 97110 THERAPEUTIC EXERCISES: CPT

## 2017-11-22 NOTE — PROGRESS NOTES
Dx: DDD lumbar spine         Authorized # of Visits:  8         Next MD visit: none scheduled  Fall Risk: standard         Precautions: n/a             Subjective: Not having any pain going down legs anymore. Has had 2 steroid shots which have helped.  Karen education-standing, transfers [employer] march 2 sec hold  x15 - iso hip  add + bridge x15 iso hip  add + bridge x20 iso hip  add + bridge x20     ASU BOSU x10 - resisted ER BTB <> iso add x30 Plank 3x15\" SS 3# x10 ea dir  pulleys     Invert BOSU    centra

## 2017-11-22 NOTE — TELEPHONE ENCOUNTER
Spoke w/ pt and reviewed instructions including holding nsaids, ibuprofen and fish oil vit E,  crill oil,  procedure date and arrival time-0700. Pt verbalized understanding and appreciation. No further needs.     [address]  PRE-PROCEDURE HERBAL SUPPLEMENTS  5 days                            Fish oil, krill oil, vitamin E              Insurance Authorization:   Most insurances are now requiring a preauthorization for all procedures.   In the event that your insurance does not

## 2017-11-27 ENCOUNTER — APPOINTMENT (OUTPATIENT)
Dept: GENERAL RADIOLOGY | Facility: HOSPITAL | Age: 37
End: 2017-11-27
Attending: ANESTHESIOLOGY
Payer: COMMERCIAL

## 2017-11-27 ENCOUNTER — HOSPITAL ENCOUNTER (OUTPATIENT)
Facility: HOSPITAL | Age: 37
Setting detail: HOSPITAL OUTPATIENT SURGERY
Discharge: HOME | End: 2017-11-27
Attending: ANESTHESIOLOGY | Admitting: ANESTHESIOLOGY
Payer: COMMERCIAL

## 2017-11-27 ENCOUNTER — SURGERY (OUTPATIENT)
Age: 37
End: 2017-11-27

## 2017-11-27 VITALS
HEART RATE: 69 BPM | RESPIRATION RATE: 18 BRPM | OXYGEN SATURATION: 99 % | DIASTOLIC BLOOD PRESSURE: 71 MMHG | SYSTOLIC BLOOD PRESSURE: 114 MMHG | TEMPERATURE: 98 F

## 2017-11-27 DIAGNOSIS — M54.16 LUMBAR RADICULOPATHY: ICD-10-CM

## 2017-11-27 PROCEDURE — 99152 MOD SED SAME PHYS/QHP 5/>YRS: CPT | Performed by: ANESTHESIOLOGY

## 2017-11-27 PROCEDURE — 81025 URINE PREGNANCY TEST: CPT | Performed by: ANESTHESIOLOGY

## 2017-11-27 PROCEDURE — 3E0R33Z INTRODUCTION OF ANTI-INFLAMMATORY INTO SPINAL CANAL, PERCUTANEOUS APPROACH: ICD-10-PCS | Performed by: ANESTHESIOLOGY

## 2017-11-27 RX ORDER — ONDANSETRON 2 MG/ML
4 INJECTION INTRAMUSCULAR; INTRAVENOUS ONCE AS NEEDED
Status: CANCELLED | OUTPATIENT
Start: 2017-11-27 | End: 2017-11-27

## 2017-11-27 RX ORDER — LIDOCAINE HYDROCHLORIDE 10 MG/ML
INJECTION, SOLUTION EPIDURAL; INFILTRATION; INTRACAUDAL; PERINEURAL AS NEEDED
Status: DISCONTINUED | OUTPATIENT
Start: 2017-11-27 | End: 2017-11-27 | Stop reason: HOSPADM

## 2017-11-27 RX ORDER — 0.9 % SODIUM CHLORIDE 0.9 %
VIAL (ML) INJECTION AS NEEDED
Status: DISCONTINUED | OUTPATIENT
Start: 2017-11-27 | End: 2017-11-27 | Stop reason: HOSPADM

## 2017-11-27 RX ORDER — DIPHENHYDRAMINE HYDROCHLORIDE 50 MG/ML
50 INJECTION INTRAMUSCULAR; INTRAVENOUS ONCE AS NEEDED
Status: CANCELLED | OUTPATIENT
Start: 2017-11-27 | End: 2017-11-27

## 2017-11-27 RX ORDER — MIDAZOLAM HYDROCHLORIDE 1 MG/ML
INJECTION INTRAMUSCULAR; INTRAVENOUS AS NEEDED
Status: DISCONTINUED | OUTPATIENT
Start: 2017-11-27 | End: 2017-11-27 | Stop reason: HOSPADM

## 2017-11-27 RX ORDER — ACETAMINOPHEN 325 MG/1
650 TABLET ORAL EVERY 6 HOURS PRN
Status: CANCELLED | OUTPATIENT
Start: 2017-11-27

## 2017-11-27 RX ORDER — SODIUM CHLORIDE, SODIUM LACTATE, POTASSIUM CHLORIDE, CALCIUM CHLORIDE 600; 310; 30; 20 MG/100ML; MG/100ML; MG/100ML; MG/100ML
100 INJECTION, SOLUTION INTRAVENOUS CONTINUOUS
Status: DISCONTINUED | OUTPATIENT
Start: 2017-11-27 | End: 2017-11-27

## 2017-11-27 RX ORDER — DEXAMETHASONE SODIUM PHOSPHATE 10 MG/ML
INJECTION, SOLUTION INTRAMUSCULAR; INTRAVENOUS AS NEEDED
Status: DISCONTINUED | OUTPATIENT
Start: 2017-11-27 | End: 2017-11-27 | Stop reason: HOSPADM

## 2017-11-27 NOTE — OPERATIVE REPORT
[facility]  Operative Report  2017     Dilia Montemayor Brisa Patient Status:  Hospital Outpatient Surgery    10/14/1980 MRN [id number]   [facility] SURGERY Attending Sandip Salgado MD   Hosp Day # 0 PCP DO Noman Portillo space at this level. The needle position was confirmed under AP and lateral fluoroscopic view. Following negative aspiration for CSF and blood, approximately 1 cc of Omnipaque 240 was injected.   An excellent contrast spread along the epidural space and th

## 2017-11-27 NOTE — H&P
History & Physical Examination    Patient Name: Tootie Banegas  MRN: [id number]  CSN: [id number]  YOB: 1980    Pre-Operative Diagnosis:  Lumbar radiculopathy [M54.16]    Present Illness: A 40year old female with low back pain is here f Smokeless tobacco: Never Used    Alcohol use Yes  0.0 - 0.5 oz/week     Comment: 1-2 drinks every 2 weeks     Pulse 81   Temp 97.6 °F (36.4 °C)   Resp 16   SpO2 98%   SYSTEM Check if Review is Normal Check if Physical Exam is Normal If not normal, please e

## 2017-12-18 ENCOUNTER — HOSPITAL ENCOUNTER (OUTPATIENT)
Dept: PHYSICAL THERAPY | Facility: HOSPITAL | Age: 37
Setting detail: THERAPIES SERIES
Discharge: HOME | End: 2017-12-18
Attending: NURSE PRACTITIONER
Payer: COMMERCIAL

## 2017-12-18 PROCEDURE — 97140 MANUAL THERAPY 1/> REGIONS: CPT

## 2017-12-18 PROCEDURE — 97110 THERAPEUTIC EXERCISES: CPT

## 2017-12-18 PROCEDURE — 97112 NEUROMUSCULAR REEDUCATION: CPT

## 2017-12-19 NOTE — PROGRESS NOTES
Discharge Summary       Pt has attended 8, cancelled 0, and no shown 0 visits in Physical Therapy. Dx: DDD lumbar spine         Subjective: Not having any pain going down legs anymore. Has had 2 steroid shots and doing exercises which have helped. neg        PLAN OF CARE:    Goals:    · Pt will improve lower abdominal recruitment to perform proper isometric contraction without requiring verbal or tactile cuing to promote advancement of therex (3 visits)-MET  · Pt will demonstrate good understanding stretch  PROM  ER R hip + isometric ER 10 sec x3    R LE   distraction x5 min MET R anterior iliium x5    R LE   distraction x5 min MET R anterior iliium x10    iso hip add x20    R LE   distraction x5 min SS red spri with squat x1'    Retro red spri x1' Total Timed Treatment: 55 min     Total Treatment Time: 55 min

## 2017-12-21 ENCOUNTER — TELEPHONE (OUTPATIENT)
Dept: FAMILY MEDICINE CLINIC | Facility: CLINIC | Age: 37
End: 2017-12-21

## 2017-12-21 DIAGNOSIS — M54.16 LUMBAR RADICULOPATHY: Primary | ICD-10-CM

## 2017-12-28 ENCOUNTER — OFFICE VISIT (OUTPATIENT)
Dept: SURGERY | Facility: CLINIC | Age: 37
End: 2017-12-28

## 2017-12-28 VITALS — HEART RATE: 72 BPM | DIASTOLIC BLOOD PRESSURE: 70 MMHG | SYSTOLIC BLOOD PRESSURE: 118 MMHG | RESPIRATION RATE: 16 BRPM

## 2017-12-28 DIAGNOSIS — M54.16 LUMBAR RADICULOPATHY: Primary | ICD-10-CM

## 2017-12-28 PROCEDURE — 99213 OFFICE O/P EST LOW 20 MIN: CPT | Performed by: ANESTHESIOLOGY

## 2017-12-28 NOTE — PROGRESS NOTES
Name: Zacarias Crow   : 10/14/1980   DOS: 2017     Pain Clinic Follow Up Visit:   Patient presents with:   Other: follow up after injections      Zacarias Crow is a 40year old female with a history of acute lumbar radicular symptoms AND PLAN:   Lumbar radiculopathy  (primary encounter diagnosis)    The patient is a 69-year-old female with a history of right-sided lumbar radiculopathy status post transforaminal epidural steroid injections ×3 with resolution of her radicular symptoms.

## 2017-12-28 NOTE — PATIENT INSTRUCTIONS
Refill policies:    • Allow 2-3 business days for refills; controlled substances may take longer.   • Contact your pharmacy at least 5 days prior to running out of medication and have them send an electronic request or submit request through the [facility] have a procedure or additional testing performed. [facility]) will contact your insurance carrier to obtain pre-certification or prior authorization.     Unfortunately, [facility] has seen an increase in denial of payment even though the p

## 2018-03-12 ENCOUNTER — OFFICE VISIT (OUTPATIENT)
Dept: FAMILY MEDICINE CLINIC | Facility: CLINIC | Age: 38
End: 2018-03-12

## 2018-03-12 VITALS
SYSTOLIC BLOOD PRESSURE: 150 MMHG | HEIGHT: 65 IN | BODY MASS INDEX: 39.32 KG/M2 | OXYGEN SATURATION: 98 % | HEART RATE: 94 BPM | WEIGHT: 236 LBS | DIASTOLIC BLOOD PRESSURE: 92 MMHG | RESPIRATION RATE: 20 BRPM | TEMPERATURE: 99 F

## 2018-03-12 DIAGNOSIS — R03.0 ELEVATED BLOOD PRESSURE READING: ICD-10-CM

## 2018-03-12 DIAGNOSIS — M54.16 LUMBAR RADICULOPATHY: Primary | ICD-10-CM

## 2018-03-12 PROCEDURE — 99214 OFFICE O/P EST MOD 30 MIN: CPT | Performed by: INTERNAL MEDICINE

## 2018-03-12 RX ORDER — TRAMADOL HYDROCHLORIDE 50 MG/1
50 TABLET ORAL EVERY 6 HOURS PRN
Qty: 20 TABLET | Refills: 0 | Status: SHIPPED | OUTPATIENT
Start: 2018-03-12 | End: 2018-09-06 | Stop reason: ALTCHOICE

## 2018-03-13 ENCOUNTER — TELEPHONE (OUTPATIENT)
Dept: SURGERY | Facility: CLINIC | Age: 38
End: 2018-03-13

## 2018-03-13 NOTE — TELEPHONE ENCOUNTER
pt wants to know if can schedule injections or does she have to come in first. Please call, if doesn't answer, leave a VM

## 2018-03-13 NOTE — PROGRESS NOTES
Pamela Ford is a 40year old female. HPI:   Here for referral to the pain clinic. History of lumbar radicular pain. ESIs by Dr. Mercy Chin and PT. Pt was doing well until this weekend when she turned wrong in her car.   Pain returned- left lower back, 98.6 °F (37 °C) (Oral)   Resp 20   Ht 65\"   Wt 236 lb   LMP 02/05/2018   SpO2 98%   BMI 39.27 kg/m²   GENERAL: well developed, well nourished,in no apparent distress  SKIN: warm & dry  HEENT: atraumatic, normocephalic   LUNGS: CTA, easy breathing  CV: nor

## 2018-03-13 NOTE — TELEPHONE ENCOUNTER
Pt states the pain is pretty much the same as before injections in November 2017, pt stood up from a chair Friday night and the pain returned. Pt did get a Tramadol Rx which helps, but still feels the stiffness and pressure, not as much pain.  Most pain is

## 2018-03-15 ENCOUNTER — OFFICE VISIT (OUTPATIENT)
Dept: SURGERY | Facility: CLINIC | Age: 38
End: 2018-03-15

## 2018-03-15 ENCOUNTER — TELEPHONE (OUTPATIENT)
Dept: SURGERY | Facility: CLINIC | Age: 38
End: 2018-03-15

## 2018-03-15 VITALS
HEART RATE: 90 BPM | WEIGHT: 236 LBS | OXYGEN SATURATION: 98 % | RESPIRATION RATE: 16 BRPM | HEIGHT: 65 IN | BODY MASS INDEX: 39.32 KG/M2

## 2018-03-15 DIAGNOSIS — M54.16 LUMBAR RADICULOPATHY: Primary | ICD-10-CM

## 2018-03-15 PROCEDURE — 99213 OFFICE O/P EST LOW 20 MIN: CPT | Performed by: ANESTHESIOLOGY

## 2018-03-15 NOTE — PROGRESS NOTES
Spoke with patient and scheduled injections. Reviewed pre-op instructions. Patient verbalized understanding, no further questions at this time. Pre-op instructions sent via [employer].

## 2018-03-15 NOTE — PATIENT INSTRUCTIONS
Refill policies:    • Allow 2-3 business days for refills; controlled substances may take longer.   • Contact your pharmacy at least 5 days prior to running out of medication and have them send an electronic request or submit request through the [facility] for the entire amount billed. Precertification and Prior Authorizations  If your physician has recommended that you have a procedure or additional testing performed.   [employer] ([employer]) will contact your insurance carrier to obtain pr to you after your procedure including discharge instructions. • Please park in the [employer] parking garage and follow the signs to the [employer].   • Please bring your Insurance Card, Photo ID, List of Current Medications and Referral (if applicable) to you your procedure will be cancelled and rescheduled to a later date. Please contact your insurance carrier to determine what your financial  responsibility will be for the procedure(s).     Cancellation/Rescheduling Appointment:   In the event you need to ca legs.  What is the purpose of an epidural injection? The long acting anti-inflammatory medication, or steroid, that is injected reduces inflammation and swelling of the nerves and other tissues surrounding the nerve roots.  This may reduce pain, tingling a so.   Can I go back to work the next day? You should be able to go back to work the next day. Again it is normal to feel some soreness or aching at the injection site.    How long does the effect of the medication last?  The [facility] • Abdominal cramping or bloating   You should contact our office immediately if you experience any side effects. If they occur, these effects usually resolve within a few days.        Carmella Segura 2016

## 2018-03-15 NOTE — TELEPHONE ENCOUNTER
Spoke to pt and confirmed the date and time for her procedure. Pt very appreciative. No further needs.

## 2018-03-15 NOTE — PROGRESS NOTES
Name: Jose Phan   : 10/14/1980   DOS: 3/15/2018     Pain Clinic Follow Up Visit:   Patient presents with:   Follow - Up: low back pain      Jose Phan is a 40year old female with a history of lumbar radiculopathy status post transfo range of motion  Back: Mild tenderness to palpation lumbar paravertebral muscles.   .       IMAGES:     No new imaging    ASSESSMENT AND PLAN:   Lumbar radiculopathy  (primary encounter diagnosis)    The patient is a 59-year-old female with a history of lum

## 2018-03-20 ENCOUNTER — TELEPHONE (OUTPATIENT)
Dept: NEUROLOGY | Facility: CLINIC | Age: 38
End: 2018-03-20

## 2018-03-20 NOTE — TELEPHONE ENCOUNTER
[id number] it has been approved for [facility] [id number]

## 2018-03-21 ENCOUNTER — TELEPHONE (OUTPATIENT)
Dept: SURGERY | Facility: CLINIC | Age: 38
End: 2018-03-21

## 2018-03-21 NOTE — TELEPHONE ENCOUNTER
Pt returned our call, spoke to patient, confirmed procedure date of 03/26 and to be checked in at outpatient registration at 9:45 am. Patient instructed to call pre-procedure line before procedure at [phone].  Patient instructed to call office if there

## 2018-03-26 ENCOUNTER — SURGERY (OUTPATIENT)
Age: 38
End: 2018-03-26

## 2018-03-26 ENCOUNTER — APPOINTMENT (OUTPATIENT)
Dept: GENERAL RADIOLOGY | Facility: HOSPITAL | Age: 38
End: 2018-03-26
Attending: ANESTHESIOLOGY
Payer: COMMERCIAL

## 2018-03-26 ENCOUNTER — HOSPITAL ENCOUNTER (OUTPATIENT)
Facility: HOSPITAL | Age: 38
Setting detail: HOSPITAL OUTPATIENT SURGERY
Discharge: HOME | End: 2018-03-26
Attending: ANESTHESIOLOGY | Admitting: ANESTHESIOLOGY
Payer: COMMERCIAL

## 2018-03-26 VITALS
SYSTOLIC BLOOD PRESSURE: 126 MMHG | RESPIRATION RATE: 16 BRPM | HEART RATE: 64 BPM | OXYGEN SATURATION: 100 % | DIASTOLIC BLOOD PRESSURE: 89 MMHG | TEMPERATURE: 98 F

## 2018-03-26 DIAGNOSIS — M54.16 LUMBAR RADICULOPATHY: ICD-10-CM

## 2018-03-26 LAB
POCT LOT NUMBER: NORMAL
POCT URINE PREGNANCY: NEGATIVE

## 2018-03-26 PROCEDURE — 99152 MOD SED SAME PHYS/QHP 5/>YRS: CPT | Performed by: ANESTHESIOLOGY

## 2018-03-26 PROCEDURE — 81025 URINE PREGNANCY TEST: CPT | Performed by: ANESTHESIOLOGY

## 2018-03-26 PROCEDURE — 3E0S33Z INTRODUCTION OF ANTI-INFLAMMATORY INTO EPIDURAL SPACE, PERCUTANEOUS APPROACH: ICD-10-PCS | Performed by: ANESTHESIOLOGY

## 2018-03-26 RX ORDER — LIDOCAINE HYDROCHLORIDE 10 MG/ML
INJECTION, SOLUTION EPIDURAL; INFILTRATION; INTRACAUDAL; PERINEURAL AS NEEDED
Status: DISCONTINUED | OUTPATIENT
Start: 2018-03-26 | End: 2018-03-26 | Stop reason: HOSPADM

## 2018-03-26 RX ORDER — DIPHENHYDRAMINE HYDROCHLORIDE 50 MG/ML
50 INJECTION INTRAMUSCULAR; INTRAVENOUS ONCE AS NEEDED
Status: CANCELLED | OUTPATIENT
Start: 2018-03-26 | End: 2018-03-26

## 2018-03-26 RX ORDER — 0.9 % SODIUM CHLORIDE 0.9 %
VIAL (ML) INJECTION AS NEEDED
Status: DISCONTINUED | OUTPATIENT
Start: 2018-03-26 | End: 2018-03-26 | Stop reason: HOSPADM

## 2018-03-26 RX ORDER — DEXAMETHASONE SODIUM PHOSPHATE 10 MG/ML
INJECTION, SOLUTION INTRAMUSCULAR; INTRAVENOUS AS NEEDED
Status: DISCONTINUED | OUTPATIENT
Start: 2018-03-26 | End: 2018-03-26 | Stop reason: HOSPADM

## 2018-03-26 RX ORDER — ONDANSETRON 2 MG/ML
4 INJECTION INTRAMUSCULAR; INTRAVENOUS ONCE AS NEEDED
Status: CANCELLED | OUTPATIENT
Start: 2018-03-26 | End: 2018-03-26

## 2018-03-26 RX ORDER — MIDAZOLAM HYDROCHLORIDE 1 MG/ML
INJECTION INTRAMUSCULAR; INTRAVENOUS AS NEEDED
Status: DISCONTINUED | OUTPATIENT
Start: 2018-03-26 | End: 2018-03-26 | Stop reason: HOSPADM

## 2018-03-26 RX ORDER — SODIUM CHLORIDE, SODIUM LACTATE, POTASSIUM CHLORIDE, CALCIUM CHLORIDE 600; 310; 30; 20 MG/100ML; MG/100ML; MG/100ML; MG/100ML
100 INJECTION, SOLUTION INTRAVENOUS CONTINUOUS
Status: DISCONTINUED | OUTPATIENT
Start: 2018-03-26 | End: 2018-03-26

## 2018-03-26 NOTE — H&P
History & Physical Examination    Patient Name: Krysta Gold  MRN: [id number]  CSN: [id number]  YOB: 1980    Pre-Operative Diagnosis:  Lumbar radiculopathy [M54.16]    Present Illness: A 40year old female with low back pain is here f SpO2 95%   SYSTEM Check if Review is Normal Check if Physical Exam is Normal If not normal, please explain:   HEENT [x ] [x ]    NECK & BACK [ ] [ ] Patient c/o pain to the low back.    HEART [x ] [x ]    LUNGS [x ] [x ]    ABDOMEN [x ] [x ]    UROGENITAL [

## 2018-03-26 NOTE — OPERATIVE REPORT
[facility]  Operative Report  3/26/2018     [address] [facility]:  [facility]    10/14/1980 MRN [id number]   [facility] SURGERY Attending Danny Edwards MD   Hosp Day # 0 PCP Vern DO Noman Corteso flushed with 1 mL lidocaine. The needle was withdrawn with the stylet intact in situ. The needle's tip was intact. The patient tolerated the procedure very well without significant immediate complication.   The patient's back was cleaned and sterile dres

## 2018-03-28 NOTE — TELEPHONE ENCOUNTER
Attempted to place courtesy call to patient for post procedure follow up. No answer. No message left.

## 2018-04-05 ENCOUNTER — OFFICE VISIT (OUTPATIENT)
Dept: SURGERY | Facility: CLINIC | Age: 38
End: 2018-04-05

## 2018-04-05 VITALS
HEIGHT: 65 IN | HEART RATE: 98 BPM | WEIGHT: 236 LBS | SYSTOLIC BLOOD PRESSURE: 132 MMHG | BODY MASS INDEX: 39.32 KG/M2 | DIASTOLIC BLOOD PRESSURE: 94 MMHG

## 2018-04-05 DIAGNOSIS — M51.36 DDD (DEGENERATIVE DISC DISEASE), LUMBAR: ICD-10-CM

## 2018-04-05 DIAGNOSIS — M54.16 LUMBAR RADICULOPATHY: Primary | ICD-10-CM

## 2018-04-05 PROBLEM — M51.369 DDD (DEGENERATIVE DISC DISEASE), LUMBAR: Status: ACTIVE | Noted: 2018-04-05

## 2018-04-05 PROCEDURE — 99214 OFFICE O/P EST MOD 30 MIN: CPT | Performed by: ANESTHESIOLOGY

## 2018-04-05 NOTE — PATIENT INSTRUCTIONS
Refill policies:    • Allow 2-3 business days for refills; controlled substances may take longer.   • Contact your pharmacy at least 5 days prior to running out of medication and have them send an electronic request or submit request through the [facility] for the entire amount billed. Precertification and Prior Authorizations  If your physician has recommended that you have a procedure or additional testing performed.   [facility] ([place]) will contact your insurance carrier to obtain pr after your procedure including discharge instructions. • Please park in the [employer] parking garage and follow the signs to the [employer].   • Please bring your Insurance Card, Photo ID, List of Current Medications and Referral (if applicable) to your appoi procedure will be cancelled and rescheduled to a later date. Please contact your insurance carrier to determine what your financial  responsibility will be for the procedure(s).     Cancellation/Rescheduling Appointment:   In the event you need to cancel

## 2018-04-05 NOTE — PROGRESS NOTES
Name: Maribell Lara   : 10/14/1980   DOS: 2018     Pain Clinic Follow Up Visit:   Patient presents with:   Follow - Up: post injections-initial relief but then re-injured herself      Maribell Lara is a 40year old female with a history Patient is a(n) 40year old year old female in no acute distress. Neurologic[de-identified] WNL-Orientation to time, place and person, normal mood & affect, concentration & attention span intact.    Inspection:  Ambulates with well-coordinated, fluid, non-antalgic gait her that from a neurological perspective, she is stable. She is not at risk of developing sudden loss of neurological function given her exam today.   From a physical therapy standpoint, the patient has trialed physical therapy in the past.  She will be wi

## 2018-04-10 ENCOUNTER — TELEPHONE (OUTPATIENT)
Dept: SURGERY | Facility: CLINIC | Age: 38
End: 2018-04-10

## 2018-04-10 NOTE — TELEPHONE ENCOUNTER
Spoke with patient and re-scheduled injection from 4-30-18 to 4-11-18 arriving at 10:30. Reviewed pre-op instructions. Patient verbalized understanding, no further questions at this time. Pt verbalized understanding to call pre-procedure phone line.

## 2018-04-11 ENCOUNTER — HOSPITAL ENCOUNTER (OUTPATIENT)
Facility: HOSPITAL | Age: 38
Setting detail: HOSPITAL OUTPATIENT SURGERY
Discharge: HOME | End: 2018-04-11
Attending: ANESTHESIOLOGY | Admitting: ANESTHESIOLOGY
Payer: COMMERCIAL

## 2018-04-11 ENCOUNTER — SURGERY (OUTPATIENT)
Age: 38
End: 2018-04-11

## 2018-04-11 ENCOUNTER — APPOINTMENT (OUTPATIENT)
Dept: GENERAL RADIOLOGY | Facility: HOSPITAL | Age: 38
End: 2018-04-11
Attending: ANESTHESIOLOGY
Payer: COMMERCIAL

## 2018-04-11 VITALS
TEMPERATURE: 99 F | HEIGHT: 64 IN | BODY MASS INDEX: 40.29 KG/M2 | RESPIRATION RATE: 16 BRPM | SYSTOLIC BLOOD PRESSURE: 138 MMHG | WEIGHT: 236 LBS | OXYGEN SATURATION: 97 % | DIASTOLIC BLOOD PRESSURE: 90 MMHG | HEART RATE: 82 BPM

## 2018-04-11 DIAGNOSIS — M51.36 DDD (DEGENERATIVE DISC DISEASE), LUMBAR: ICD-10-CM

## 2018-04-11 DIAGNOSIS — M54.16 LUMBAR RADICULOPATHY: ICD-10-CM

## 2018-04-11 PROCEDURE — 99152 MOD SED SAME PHYS/QHP 5/>YRS: CPT | Performed by: ANESTHESIOLOGY

## 2018-04-11 PROCEDURE — 81025 URINE PREGNANCY TEST: CPT | Performed by: ANESTHESIOLOGY

## 2018-04-11 PROCEDURE — 3E0U3BZ INTRODUCTION OF ANESTHETIC AGENT INTO JOINTS, PERCUTANEOUS APPROACH: ICD-10-PCS | Performed by: ANESTHESIOLOGY

## 2018-04-11 PROCEDURE — 3E0U33Z INTRODUCTION OF ANTI-INFLAMMATORY INTO JOINTS, PERCUTANEOUS APPROACH: ICD-10-PCS | Performed by: ANESTHESIOLOGY

## 2018-04-11 RX ORDER — LIDOCAINE HYDROCHLORIDE 10 MG/ML
INJECTION, SOLUTION EPIDURAL; INFILTRATION; INTRACAUDAL; PERINEURAL AS NEEDED
Status: DISCONTINUED | OUTPATIENT
Start: 2018-04-11 | End: 2018-04-11 | Stop reason: HOSPADM

## 2018-04-11 RX ORDER — ONDANSETRON 2 MG/ML
4 INJECTION INTRAMUSCULAR; INTRAVENOUS ONCE AS NEEDED
Status: CANCELLED | OUTPATIENT
Start: 2018-04-11 | End: 2018-04-11

## 2018-04-11 RX ORDER — DIPHENHYDRAMINE HYDROCHLORIDE 50 MG/ML
50 INJECTION INTRAMUSCULAR; INTRAVENOUS ONCE AS NEEDED
Status: CANCELLED | OUTPATIENT
Start: 2018-04-11 | End: 2018-04-11

## 2018-04-11 RX ORDER — MIDAZOLAM HYDROCHLORIDE 1 MG/ML
INJECTION INTRAMUSCULAR; INTRAVENOUS AS NEEDED
Status: DISCONTINUED | OUTPATIENT
Start: 2018-04-11 | End: 2018-04-11 | Stop reason: HOSPADM

## 2018-04-11 RX ORDER — METHYLPREDNISOLONE ACETATE 40 MG/ML
INJECTION, SUSPENSION INTRA-ARTICULAR; INTRALESIONAL; INTRAMUSCULAR; SOFT TISSUE AS NEEDED
Status: DISCONTINUED | OUTPATIENT
Start: 2018-04-11 | End: 2018-04-11 | Stop reason: HOSPADM

## 2018-04-11 RX ORDER — SODIUM CHLORIDE, SODIUM LACTATE, POTASSIUM CHLORIDE, CALCIUM CHLORIDE 600; 310; 30; 20 MG/100ML; MG/100ML; MG/100ML; MG/100ML
100 INJECTION, SOLUTION INTRAVENOUS CONTINUOUS
Status: DISCONTINUED | OUTPATIENT
Start: 2018-04-11 | End: 2018-04-11

## 2018-04-11 NOTE — H&P
History & Physical Examination    Patient Name: Jacinta Kimball  MRN: [id number]  [facility]: [id number]  YOB: 1980    Pre-Operative Diagnosis:  Lumbar radiculopathy [M54.16]  DDD (degenerative disc disease), lumbar [M51.36]    Present Illness: Review is Normal Check if Physical Exam is Normal If not normal, please explain:   HEENT [x ] [x ]    NECK & BACK [x ] [x ]    HEART [x ] [x ]    LUNGS [x ] [x ]    ABDOMEN [x ] [x ]    UROGENITAL [x ] [x ]    EXTREMITIES [x ] [x ]    OTHER        [ x ] I

## 2018-04-11 NOTE — OPERATIVE REPORT
[facility]  Operative Report  2018     [address] [facility] Patient Status:  Hospital Outpatient Surgery    10/14/1980 MRN [id number]   [facility] SURGERY Attending Marcio Lee MD   Hosp Day # 0 PCP DO Ayana Yeung approximately 1 mL of 1% lidocaine with 10 mg of methylprednisolone was injected into each joint without complication. The needle was withdrawn with stylet in situ after being flushed with 0.5 mL lidocaine.   The contralateral injections were performed in

## 2018-04-13 ENCOUNTER — TELEPHONE (OUTPATIENT)
Dept: NEUROLOGY | Facility: CLINIC | Age: 38
End: 2018-04-13

## 2018-04-19 ENCOUNTER — OFFICE VISIT (OUTPATIENT)
Dept: SURGERY | Facility: CLINIC | Age: 38
End: 2018-04-19

## 2018-04-19 VITALS
HEART RATE: 88 BPM | BODY MASS INDEX: 38.65 KG/M2 | HEIGHT: 65 IN | WEIGHT: 232 LBS | DIASTOLIC BLOOD PRESSURE: 80 MMHG | SYSTOLIC BLOOD PRESSURE: 130 MMHG

## 2018-04-19 DIAGNOSIS — M51.36 DDD (DEGENERATIVE DISC DISEASE), LUMBAR: Primary | ICD-10-CM

## 2018-04-19 DIAGNOSIS — M54.16 LUMBAR RADICULOPATHY: ICD-10-CM

## 2018-04-19 PROCEDURE — 99213 OFFICE O/P EST LOW 20 MIN: CPT | Performed by: ANESTHESIOLOGY

## 2018-04-19 NOTE — PROGRESS NOTES
Name: Ellis Marques   : 10/14/1980   DOS: 2018     Pain Clinic Follow Up Visit:   Patient presents with:   Follow - Up: post injection- 50% relief      Ellis Marques is a 40year old female here for follow-up after her lumbar facet inj No new imaging for interpretation      ASSESSMENT AND PLAN:   DDD (degenerative disc disease), lumbar  (primary encounter diagnosis)  Lumbar radiculopathy      The patient is a 20-year-old female with a history of axial low back pain.   She did very well

## 2018-05-14 ENCOUNTER — TELEPHONE (OUTPATIENT)
Dept: SURGERY | Facility: CLINIC | Age: 38
End: 2018-05-14

## 2018-05-14 DIAGNOSIS — M47.816 SPONDYLOSIS OF LUMBAR REGION WITHOUT MYELOPATHY OR RADICULOPATHY: Primary | ICD-10-CM

## 2018-05-14 NOTE — TELEPHONE ENCOUNTER
Spoke with patient and scheduled RFA for 6-4-18 with a 7:30 arrival. Reviewed pre-op instructions. Patient verbalized understanding, no further questions at this time. Pre-op instructions sent via [employer].          ASSESSMENT AND PLAN:   DDD (degenerative d

## 2018-05-17 NOTE — TELEPHONE ENCOUNTER
LM for pt to call and schedule 2nd RFA. Case scheduled and placed in OR's at a [facility] for first RFA.

## 2018-05-24 ENCOUNTER — TELEPHONE (OUTPATIENT)
Dept: NEUROLOGY | Facility: CLINIC | Age: 38
End: 2018-05-24

## 2018-05-30 ENCOUNTER — TELEPHONE (OUTPATIENT)
Dept: SURGERY | Facility: CLINIC | Age: 38
End: 2018-05-30

## 2018-05-30 NOTE — TELEPHONE ENCOUNTER
Left message for patient, confirmed procedure date of 6/4/18 and to be checked in at outpatient registration at 730 am. Patient instructed to call pre-procedure line before procedure at [phone].  Patient instructed to call office if there are additiona

## 2018-06-04 ENCOUNTER — HOSPITAL ENCOUNTER (OUTPATIENT)
Facility: HOSPITAL | Age: 38
Setting detail: HOSPITAL OUTPATIENT SURGERY
Discharge: HOME | End: 2018-06-04
Attending: ANESTHESIOLOGY | Admitting: ANESTHESIOLOGY
Payer: COMMERCIAL

## 2018-06-04 ENCOUNTER — APPOINTMENT (OUTPATIENT)
Dept: GENERAL RADIOLOGY | Facility: HOSPITAL | Age: 38
End: 2018-06-04
Attending: ANESTHESIOLOGY
Payer: COMMERCIAL

## 2018-06-04 ENCOUNTER — SURGERY (OUTPATIENT)
Age: 38
End: 2018-06-04

## 2018-06-04 VITALS
OXYGEN SATURATION: 97 % | SYSTOLIC BLOOD PRESSURE: 121 MMHG | TEMPERATURE: 98 F | RESPIRATION RATE: 18 BRPM | HEART RATE: 70 BPM | DIASTOLIC BLOOD PRESSURE: 82 MMHG

## 2018-06-04 DIAGNOSIS — M47.816 SPONDYLOSIS OF LUMBAR REGION WITHOUT MYELOPATHY OR RADICULOPATHY: ICD-10-CM

## 2018-06-04 PROCEDURE — 3E0T3TZ INTRODUCTION OF DESTRUCTIVE AGENT INTO PERIPHERAL NERVES AND PLEXI, PERCUTANEOUS APPROACH: ICD-10-PCS | Performed by: ANESTHESIOLOGY

## 2018-06-04 PROCEDURE — 81025 URINE PREGNANCY TEST: CPT | Performed by: ANESTHESIOLOGY

## 2018-06-04 PROCEDURE — 99152 MOD SED SAME PHYS/QHP 5/>YRS: CPT | Performed by: ANESTHESIOLOGY

## 2018-06-04 RX ORDER — LIDOCAINE HYDROCHLORIDE 10 MG/ML
INJECTION, SOLUTION EPIDURAL; INFILTRATION; INTRACAUDAL; PERINEURAL AS NEEDED
Status: DISCONTINUED | OUTPATIENT
Start: 2018-06-04 | End: 2018-06-04 | Stop reason: HOSPADM

## 2018-06-04 RX ORDER — SODIUM CHLORIDE, SODIUM LACTATE, POTASSIUM CHLORIDE, CALCIUM CHLORIDE 600; 310; 30; 20 MG/100ML; MG/100ML; MG/100ML; MG/100ML
100 INJECTION, SOLUTION INTRAVENOUS CONTINUOUS
Status: DISCONTINUED | OUTPATIENT
Start: 2018-06-04 | End: 2018-06-04

## 2018-06-04 RX ORDER — DIPHENHYDRAMINE HYDROCHLORIDE 50 MG/ML
50 INJECTION INTRAMUSCULAR; INTRAVENOUS ONCE AS NEEDED
Status: DISCONTINUED | OUTPATIENT
Start: 2018-06-04 | End: 2018-06-04

## 2018-06-04 RX ORDER — MIDAZOLAM HYDROCHLORIDE 1 MG/ML
INJECTION INTRAMUSCULAR; INTRAVENOUS AS NEEDED
Status: DISCONTINUED | OUTPATIENT
Start: 2018-06-04 | End: 2018-06-04 | Stop reason: HOSPADM

## 2018-06-04 RX ORDER — ONDANSETRON 2 MG/ML
4 INJECTION INTRAMUSCULAR; INTRAVENOUS ONCE AS NEEDED
Status: DISCONTINUED | OUTPATIENT
Start: 2018-06-04 | End: 2018-06-04

## 2018-06-04 RX ORDER — METHYLPREDNISOLONE ACETATE 40 MG/ML
INJECTION, SUSPENSION INTRA-ARTICULAR; INTRALESIONAL; INTRAMUSCULAR; SOFT TISSUE AS NEEDED
Status: DISCONTINUED | OUTPATIENT
Start: 2018-06-04 | End: 2018-06-04 | Stop reason: HOSPADM

## 2018-06-04 NOTE — H&P
History & Physical Examination    Patient Name: Mahsa Bass  MRN: [id number]  [facility]: [id number]  YOB: 1980    Pre-Operative Diagnosis:  Spondylosis of lumbar region without myelopathy or radiculopathy [M47.816]    Present Illness: Kathy Check if Physical Exam is Normal If not normal, please explain:   HEENT [x ] [x ]    NECK & BACK [x ] [x ]    HEART [x ] [x ]    LUNGS [x ] [x ]    ABDOMEN [x ] [x ]    UROGENITAL [x ] [x ]    EXTREMITIES [x ] [x ]    OTHER        [ x ] I have discussed th

## 2018-06-04 NOTE — OPERATIVE REPORT
[facility]  Operative Report  2018     Kenna Peralesdonna Patient Status:  Hospital Outpatient Surgery    10/14/1980 MRN [id number]   [facility] SURGERY Attending Abida Kelly MD   Hosp Day # 0 PCP Maite Chappell DO     Indication L2-L3 level . The needle was then walked off the bony tissue and advanced 2 to 3 mm to lie along the path of the L2 medial branch nerve. AP and lateral radiographs were obtained to document proper needle position.   Sensory and motor stimulation were then

## 2018-06-06 ENCOUNTER — TELEPHONE (OUTPATIENT)
Dept: SURGERY | Facility: CLINIC | Age: 38
End: 2018-06-06

## 2018-06-06 NOTE — TELEPHONE ENCOUNTER
Left message for patient, confirmed procedure date of 6/11/18 with Dr Oksana iDxon and to be checked in at outpatient registration at 7:00 am. Patient instructed to call pre-procedure line before procedure at [phone].  Patient instructed to call office if ther

## 2018-06-11 ENCOUNTER — APPOINTMENT (OUTPATIENT)
Dept: GENERAL RADIOLOGY | Facility: HOSPITAL | Age: 38
End: 2018-06-11
Attending: ANESTHESIOLOGY
Payer: COMMERCIAL

## 2018-06-11 ENCOUNTER — SURGERY (OUTPATIENT)
Age: 38
End: 2018-06-11

## 2018-06-11 ENCOUNTER — HOSPITAL ENCOUNTER (OUTPATIENT)
Facility: HOSPITAL | Age: 38
Setting detail: HOSPITAL OUTPATIENT SURGERY
Discharge: HOME | End: 2018-06-11
Attending: ANESTHESIOLOGY | Admitting: ANESTHESIOLOGY
Payer: COMMERCIAL

## 2018-06-11 VITALS
DIASTOLIC BLOOD PRESSURE: 88 MMHG | SYSTOLIC BLOOD PRESSURE: 128 MMHG | OXYGEN SATURATION: 98 % | TEMPERATURE: 98 F | RESPIRATION RATE: 18 BRPM | HEART RATE: 71 BPM

## 2018-06-11 DIAGNOSIS — M47.816 SPONDYLOSIS OF LUMBAR REGION WITHOUT MYELOPATHY OR RADICULOPATHY: ICD-10-CM

## 2018-06-11 PROCEDURE — 3E0T3TZ INTRODUCTION OF DESTRUCTIVE AGENT INTO PERIPHERAL NERVES AND PLEXI, PERCUTANEOUS APPROACH: ICD-10-PCS | Performed by: ANESTHESIOLOGY

## 2018-06-11 PROCEDURE — 99152 MOD SED SAME PHYS/QHP 5/>YRS: CPT | Performed by: ANESTHESIOLOGY

## 2018-06-11 PROCEDURE — 81025 URINE PREGNANCY TEST: CPT | Performed by: ANESTHESIOLOGY

## 2018-06-11 RX ORDER — ONDANSETRON 2 MG/ML
4 INJECTION INTRAMUSCULAR; INTRAVENOUS ONCE AS NEEDED
Status: DISCONTINUED | OUTPATIENT
Start: 2018-06-11 | End: 2018-06-11

## 2018-06-11 RX ORDER — DIPHENHYDRAMINE HYDROCHLORIDE 50 MG/ML
50 INJECTION INTRAMUSCULAR; INTRAVENOUS ONCE AS NEEDED
Status: DISCONTINUED | OUTPATIENT
Start: 2018-06-11 | End: 2018-06-11

## 2018-06-11 RX ORDER — SODIUM CHLORIDE, SODIUM LACTATE, POTASSIUM CHLORIDE, CALCIUM CHLORIDE 600; 310; 30; 20 MG/100ML; MG/100ML; MG/100ML; MG/100ML
100 INJECTION, SOLUTION INTRAVENOUS CONTINUOUS
Status: DISCONTINUED | OUTPATIENT
Start: 2018-06-11 | End: 2018-06-11

## 2018-06-11 RX ORDER — MIDAZOLAM HYDROCHLORIDE 1 MG/ML
INJECTION INTRAMUSCULAR; INTRAVENOUS AS NEEDED
Status: DISCONTINUED | OUTPATIENT
Start: 2018-06-11 | End: 2018-06-11 | Stop reason: HOSPADM

## 2018-06-11 RX ORDER — ONDANSETRON 2 MG/ML
4 INJECTION INTRAMUSCULAR; INTRAVENOUS ONCE AS NEEDED
Status: DISCONTINUED | OUTPATIENT
Start: 2018-06-11 | End: 2018-06-11 | Stop reason: HOSPADM

## 2018-06-11 RX ORDER — LIDOCAINE HYDROCHLORIDE 10 MG/ML
INJECTION, SOLUTION EPIDURAL; INFILTRATION; INTRACAUDAL; PERINEURAL AS NEEDED
Status: DISCONTINUED | OUTPATIENT
Start: 2018-06-11 | End: 2018-06-11 | Stop reason: HOSPADM

## 2018-06-11 RX ORDER — METHYLPREDNISOLONE ACETATE 40 MG/ML
INJECTION, SUSPENSION INTRA-ARTICULAR; INTRALESIONAL; INTRAMUSCULAR; SOFT TISSUE AS NEEDED
Status: DISCONTINUED | OUTPATIENT
Start: 2018-06-11 | End: 2018-06-11 | Stop reason: HOSPADM

## 2018-06-11 RX ORDER — DIPHENHYDRAMINE HYDROCHLORIDE 50 MG/ML
50 INJECTION INTRAMUSCULAR; INTRAVENOUS ONCE AS NEEDED
Status: DISCONTINUED | OUTPATIENT
Start: 2018-06-11 | End: 2018-06-11 | Stop reason: HOSPADM

## 2018-06-11 NOTE — OPERATIVE REPORT
[facility]  Operative Report  2018     [address] Patient Status:  Hospital Outpatient Surgery    10/14/1980 MRN [id number]   [facility] SURGERY Attending Geoffrey Samuel MD   Hosp Day # 0 PCP DO Ayana Fleming articular process at left L2-L3 level . The needle was then walked off the bony tissue and advanced 2 to 3 mm to lie along the path of the L2 medial branch nerve. AP and lateral radiographs were obtained to document proper needle position.   Sensory and m

## 2018-06-11 NOTE — H&P
History & Physical Examination    Patient Name: Barrett Fung  MRN: [id number]  [facility]: [id number]  YOB: 1980    Pre-Operative Diagnosis:  Spondylosis of lumbar region without myelopathy or radiculopathy [M47.816]    Present Illness: Kathy Review is Normal Check if Physical Exam is Normal If not normal, please explain:   HEENT [x ] [x ]    NECK & BACK [x ] [x ]    HEART [x ] [x ]    LUNGS [x ] [x ]    ABDOMEN [x ] [x ]    UROGENITAL [x ] [x ]    EXTREMITIES [x ] [x ]    OTHER        [ x ] I

## 2018-08-09 ENCOUNTER — OFFICE VISIT (OUTPATIENT)
Dept: FAMILY MEDICINE CLINIC | Facility: CLINIC | Age: 38
End: 2018-08-09

## 2018-08-09 VITALS
WEIGHT: 238 LBS | TEMPERATURE: 99 F | HEIGHT: 65 IN | SYSTOLIC BLOOD PRESSURE: 132 MMHG | RESPIRATION RATE: 16 BRPM | HEART RATE: 90 BPM | BODY MASS INDEX: 39.65 KG/M2 | DIASTOLIC BLOOD PRESSURE: 82 MMHG

## 2018-08-09 DIAGNOSIS — Z01.419 WELL WOMAN EXAM WITH ROUTINE GYNECOLOGICAL EXAM: Primary | ICD-10-CM

## 2018-08-09 DIAGNOSIS — R63.2 BINGE EATING: ICD-10-CM

## 2018-08-09 DIAGNOSIS — Z00.00 ANNUAL PHYSICAL EXAM: ICD-10-CM

## 2018-08-09 DIAGNOSIS — Z30.432 ENCOUNTER FOR IUD REMOVAL: ICD-10-CM

## 2018-08-09 PROCEDURE — 87624 HPV HI-RISK TYP POOLED RSLT: CPT | Performed by: FAMILY MEDICINE

## 2018-08-09 PROCEDURE — 88175 CYTOPATH C/V AUTO FLUID REDO: CPT | Performed by: FAMILY MEDICINE

## 2018-08-09 PROCEDURE — 58301 REMOVE INTRAUTERINE DEVICE: CPT | Performed by: FAMILY MEDICINE

## 2018-08-09 PROCEDURE — 99395 PREV VISIT EST AGE 18-39: CPT | Performed by: FAMILY MEDICINE

## 2018-08-09 RX ORDER — BUPROPION HYDROCHLORIDE 150 MG/1
150 TABLET, EXTENDED RELEASE ORAL 2 TIMES DAILY
Qty: 60 TABLET | Refills: 0 | Status: SHIPPED | OUTPATIENT
Start: 2018-08-09 | End: 2019-04-17

## 2018-08-09 NOTE — PROGRESS NOTES
HPI:   Leigh Gomez is a 40year old female who presents for a complete physical exam.     Wt Readings from Last 6 Encounters:  08/09/18 : 238 lb  04/19/18 : 232 lb  04/11/18 : 236 lb  04/05/18 : 236 lb  03/15/18 : 236 lb  03/12/18 : 236 lb    Body Smokeless tobacco: Never Used                      Alcohol use: Yes           0.0 - 0.5 oz/week     Standard drinks or equivalent: 0 - 1 per week     Comment: 1-2 drinks every 2 weeks    Occ: . : .  Children: 3      Exerc Frances Ortiz is a 40year old female who presents for    1. Well woman exam with routine gynecological exam      2. Annual physical exam    - FREE T4 (FREE THYROXINE); Future  - ASSAY, THYROID STIM HORMONE; Future  - LIPID PANEL;  Future  - CBC WI

## 2018-08-10 LAB — HPV I/H RISK 1 DNA SPEC QL NAA+PROBE: NEGATIVE

## 2018-09-04 ENCOUNTER — TELEPHONE (OUTPATIENT)
Dept: SURGERY | Facility: CLINIC | Age: 38
End: 2018-09-04

## 2018-09-04 NOTE — TELEPHONE ENCOUNTER
Refill tramadol      Patient informed of 48 hour refill policy excluding weekends and holidays. Informed patient only patient can  the prescription effective April 1, 2017.   Further explained patient will not receive a call back once prescription is

## 2018-09-06 ENCOUNTER — OFFICE VISIT (OUTPATIENT)
Dept: PAIN CLINIC | Facility: CLINIC | Age: 38
End: 2018-09-06

## 2018-09-06 VITALS
HEIGHT: 65 IN | OXYGEN SATURATION: 98 % | HEART RATE: 97 BPM | WEIGHT: 238 LBS | DIASTOLIC BLOOD PRESSURE: 88 MMHG | BODY MASS INDEX: 39.65 KG/M2 | SYSTOLIC BLOOD PRESSURE: 142 MMHG

## 2018-09-06 DIAGNOSIS — M51.36 DDD (DEGENERATIVE DISC DISEASE), LUMBAR: Primary | ICD-10-CM

## 2018-09-06 PROCEDURE — 99213 OFFICE O/P EST LOW 20 MIN: CPT | Performed by: ANESTHESIOLOGY

## 2018-09-06 RX ORDER — TRAMADOL HYDROCHLORIDE 50 MG/1
50 TABLET ORAL DAILY
Qty: 30 TABLET | Refills: 0 | Status: SHIPPED | OUTPATIENT
Start: 2018-09-06 | End: 2019-12-23 | Stop reason: ALTCHOICE

## 2018-09-06 RX ORDER — CYCLOBENZAPRINE HCL 10 MG
10 TABLET ORAL 3 TIMES DAILY PRN
Qty: 90 TABLET | Refills: 0 | Status: SHIPPED | OUTPATIENT
Start: 2018-09-06 | End: 2019-11-26

## 2018-09-06 NOTE — PATIENT INSTRUCTIONS
Refill policies:    • Allow 2-3 business days for refills; controlled substances may take longer.   • Contact your pharmacy at least 5 days prior to running out of medication and have them send an electronic request or submit request through the “request re entire amount billed. Precertification and Prior Authorizations: If your physician has recommended that you have a procedure or additional testing performed.   [employer] [facility]) will contact your insurance carrier to obtain pre-certi

## 2018-09-06 NOTE — PROGRESS NOTES
Name: Mahsa Bass   : 10/14/1980   DOS: 2018     Pain Clinic Follow Up Visit:   Patient presents with: Follow - Up: Lumbar pain radiating to hips bilaterally, most intense on right side. RFA's in , pain returned this week.  9/10      Le intact. Inspection:  Ambulates with well-coordinated, fluid, non-antalgic gait. Gait is normal.  Neck: Full range of motion  Back: Moderate tenderness to palpation lumbar paravertebral muscles.   Negative Óscar      IMAGES:     No new imaging for interpr issues and agrees to the plan. No Follow-up on file.     Anna Marie Mckeon MD, 9/6/2018, 3:32 PM

## 2018-09-07 ENCOUNTER — TELEPHONE (OUTPATIENT)
Dept: PAIN CLINIC | Facility: CLINIC | Age: 38
End: 2018-09-07

## 2018-09-07 NOTE — TELEPHONE ENCOUNTER
Pt seen in [facility] yesterday by Dr. Keshia Gimenez and recommended for LESI (X 1). Please begin PA process for procedure(s).      Laterality: n/a  Level(s): n/a    Pt informed callback will be given when PA feedback received and procedure date to be scheduled after appro

## 2018-09-11 ENCOUNTER — TELEPHONE (OUTPATIENT)
Dept: SURGERY | Facility: CLINIC | Age: 38
End: 2018-09-11

## 2018-09-11 NOTE — TELEPHONE ENCOUNTER
Spoke with pt and informed that our office has not received prior authorization from her insurance company as of now. Explained that our office will call pt back as soon as the authorization has been received.  Pt expressed appreciation, no additional needs

## 2018-09-13 ENCOUNTER — TELEPHONE (OUTPATIENT)
Dept: NEUROLOGY | Facility: CLINIC | Age: 38
End: 2018-09-13

## 2018-09-13 NOTE — TELEPHONE ENCOUNTER
Procedure scheduled for 9/17/18 at 830.     Pre-procedure instructions reviewed and given to patient. Patient verbalized understanding.

## 2018-09-17 ENCOUNTER — APPOINTMENT (OUTPATIENT)
Dept: GENERAL RADIOLOGY | Facility: HOSPITAL | Age: 38
End: 2018-09-17
Attending: ANESTHESIOLOGY
Payer: COMMERCIAL

## 2018-09-17 ENCOUNTER — HOSPITAL ENCOUNTER (OUTPATIENT)
Facility: HOSPITAL | Age: 38
Setting detail: HOSPITAL OUTPATIENT SURGERY
Discharge: HOME | End: 2018-09-17
Attending: ANESTHESIOLOGY | Admitting: ANESTHESIOLOGY
Payer: COMMERCIAL

## 2018-09-17 VITALS
OXYGEN SATURATION: 95 % | HEART RATE: 81 BPM | TEMPERATURE: 99 F | DIASTOLIC BLOOD PRESSURE: 86 MMHG | RESPIRATION RATE: 18 BRPM | SYSTOLIC BLOOD PRESSURE: 134 MMHG

## 2018-09-17 DIAGNOSIS — M51.36 DDD (DEGENERATIVE DISC DISEASE), LUMBAR: ICD-10-CM

## 2018-09-17 LAB
POCT LOT NUMBER: NORMAL
POCT URINE PREGNANCY: NEGATIVE
PROCEDURE CONTROL: YES

## 2018-09-17 PROCEDURE — 3E0R33Z INTRODUCTION OF ANTI-INFLAMMATORY INTO SPINAL CANAL, PERCUTANEOUS APPROACH: ICD-10-PCS | Performed by: ANESTHESIOLOGY

## 2018-09-17 PROCEDURE — 99152 MOD SED SAME PHYS/QHP 5/>YRS: CPT | Performed by: ANESTHESIOLOGY

## 2018-09-17 PROCEDURE — 81025 URINE PREGNANCY TEST: CPT | Performed by: ANESTHESIOLOGY

## 2018-09-17 RX ORDER — MIDAZOLAM HYDROCHLORIDE 1 MG/ML
INJECTION INTRAMUSCULAR; INTRAVENOUS AS NEEDED
Status: DISCONTINUED | OUTPATIENT
Start: 2018-09-17 | End: 2018-09-17 | Stop reason: HOSPADM

## 2018-09-17 RX ORDER — DEXAMETHASONE SODIUM PHOSPHATE 10 MG/ML
INJECTION, SOLUTION INTRAMUSCULAR; INTRAVENOUS AS NEEDED
Status: DISCONTINUED | OUTPATIENT
Start: 2018-09-17 | End: 2018-09-17 | Stop reason: HOSPADM

## 2018-09-17 RX ORDER — LIDOCAINE HYDROCHLORIDE 10 MG/ML
INJECTION, SOLUTION EPIDURAL; INFILTRATION; INTRACAUDAL; PERINEURAL AS NEEDED
Status: DISCONTINUED | OUTPATIENT
Start: 2018-09-17 | End: 2018-09-17 | Stop reason: HOSPADM

## 2018-09-17 RX ORDER — ONDANSETRON 2 MG/ML
4 INJECTION INTRAMUSCULAR; INTRAVENOUS ONCE AS NEEDED
Status: DISCONTINUED | OUTPATIENT
Start: 2018-09-17 | End: 2018-09-17 | Stop reason: HOSPADM

## 2018-09-17 RX ORDER — DIPHENHYDRAMINE HYDROCHLORIDE 50 MG/ML
50 INJECTION INTRAMUSCULAR; INTRAVENOUS ONCE AS NEEDED
Status: DISCONTINUED | OUTPATIENT
Start: 2018-09-17 | End: 2018-09-17

## 2018-09-17 RX ORDER — SODIUM CHLORIDE, SODIUM LACTATE, POTASSIUM CHLORIDE, CALCIUM CHLORIDE 600; 310; 30; 20 MG/100ML; MG/100ML; MG/100ML; MG/100ML
100 INJECTION, SOLUTION INTRAVENOUS CONTINUOUS
Status: DISCONTINUED | OUTPATIENT
Start: 2018-09-17 | End: 2018-09-17

## 2018-09-17 RX ORDER — 0.9 % SODIUM CHLORIDE 0.9 %
VIAL (ML) INJECTION AS NEEDED
Status: DISCONTINUED | OUTPATIENT
Start: 2018-09-17 | End: 2018-09-17 | Stop reason: HOSPADM

## 2018-09-17 RX ORDER — ONDANSETRON 2 MG/ML
4 INJECTION INTRAMUSCULAR; INTRAVENOUS ONCE AS NEEDED
Status: DISCONTINUED | OUTPATIENT
Start: 2018-09-17 | End: 2018-09-17

## 2018-09-17 NOTE — OR NURSING
The  of the patient was very upset because first the pre-op nurse explained that their 3year old child would have to wait in waiting room and then when the pt returned from procedure the post-op nurse also explained why the child could not stay.  Anthony Avila

## 2018-09-17 NOTE — OPERATIVE REPORT
[facility]  Operative Report  2018     [address] Patient Status:  Hospital Outpatient Surgery    10/14/1980 MRN [id number]   [facility] SURGERY Attending Will Santizo MD   Hosp Day # 0 PCP DO Ayana Johnson through the Tuohy needle. The needle was flushed with 1 mL lidocaine. The needle was withdrawn with the stylet intact in situ. The needle's tip was intact. The patient tolerated the procedure very well without significant immediate complication.   The p

## 2018-09-17 NOTE — H&P
History & Physical Examination    Patient Name: Percy Escalante  MRN: [id number]  CSN: [id number]  YOB: 1980    Pre-Operative Diagnosis:  DDD (degenerative disc disease), lumbar [M51.36]    Present Illness: Patient with lumbar degenerati Performed by Re Oshea MD at [address] [facility] OR  3/26/2018: LUMBAR INTERLAMINAR EPIDURAL INJECTION; N/A      Comment:  Performed by Re Oshea MD at [address] [facility]  No date: ORAL SURGERY PROCEDURE      Comment:  wisdom tooth resection  4//2013: OTHER      Comment

## 2018-10-09 NOTE — TELEPHONE ENCOUNTER
Last ov was 8/9/18  Last refill bupropion 8/9/18      Pt is due for a 1 month f/u.  Please call to schedule one

## 2019-04-10 RX ORDER — BUPROPION HYDROCHLORIDE 150 MG/1
TABLET, EXTENDED RELEASE ORAL
Qty: 60 TABLET | Refills: 0 | OUTPATIENT
Start: 2019-04-10

## 2019-04-17 ENCOUNTER — OFFICE VISIT (OUTPATIENT)
Dept: FAMILY MEDICINE CLINIC | Facility: CLINIC | Age: 39
End: 2019-04-17

## 2019-04-17 ENCOUNTER — TELEPHONE (OUTPATIENT)
Dept: SURGERY | Facility: CLINIC | Age: 39
End: 2019-04-17

## 2019-04-17 VITALS
DIASTOLIC BLOOD PRESSURE: 78 MMHG | OXYGEN SATURATION: 98 % | SYSTOLIC BLOOD PRESSURE: 128 MMHG | WEIGHT: 226.25 LBS | TEMPERATURE: 99 F | RESPIRATION RATE: 18 BRPM | HEIGHT: 65 IN | BODY MASS INDEX: 37.7 KG/M2 | HEART RATE: 75 BPM

## 2019-04-17 DIAGNOSIS — M51.36 DDD (DEGENERATIVE DISC DISEASE), LUMBAR: ICD-10-CM

## 2019-04-17 DIAGNOSIS — M54.16 LUMBAR RADICULOPATHY: Primary | ICD-10-CM

## 2019-04-17 PROCEDURE — 99214 OFFICE O/P EST MOD 30 MIN: CPT | Performed by: FAMILY MEDICINE

## 2019-04-17 RX ORDER — BUPROPION HYDROCHLORIDE 150 MG/1
150 TABLET, EXTENDED RELEASE ORAL 2 TIMES DAILY
Qty: 60 TABLET | Refills: 4 | Status: SHIPPED | OUTPATIENT
Start: 2019-04-17 | End: 2019-09-30

## 2019-04-17 NOTE — PROGRESS NOTES
HPI:   Leigh Gomez is a 45year old female who presents for back pain and binge eating follow up     H/o chronic lumbar pain and has seen pain in the past for RFA  Pt was at work on Monday and fell over a rug  Pt now has an exacerbation of her cindy Geoffrey Samuel MD at [facility] MAIN OR   • LUMBAR INTERLAMINAR EPIDURAL INJECTION N/A 3/26/2018    Performed by Geoffrey Samuel MD at [facility] MAIN OR   • ORAL SURGERY PROCEDURE      wisdom tooth resection   • OTHER  4//2013    d&C   • RADIOFREQUENCY ABLATION OF THORACIC OR 226 lb 4 oz   LMP 04/11/2019   SpO2 98%   BMI 37.65 kg/m²   Body mass index is 37.65 kg/m².    GENERAL: alert and oriented X 3, well developed, well nourished,in no apparent distress  MUSCULOSKELETAL: + lumbar pain   LE: 5+ strength 1+ DTR's normal sensatio

## 2019-04-18 NOTE — TELEPHONE ENCOUNTER
Pt scheduled WC appt 4/23 with Dr Manish Samayoa paperwork endorsed to Pain MA saravanan folder in [phone];WC eligibility verified by Haleigh Whitaker [phone]

## 2019-04-23 ENCOUNTER — OFFICE VISIT (OUTPATIENT)
Dept: PAIN CLINIC | Facility: CLINIC | Age: 39
End: 2019-04-23
Payer: OTHER MISCELLANEOUS

## 2019-04-23 VITALS
DIASTOLIC BLOOD PRESSURE: 90 MMHG | BODY MASS INDEX: 37.65 KG/M2 | HEIGHT: 65 IN | HEART RATE: 93 BPM | SYSTOLIC BLOOD PRESSURE: 132 MMHG | WEIGHT: 226 LBS | OXYGEN SATURATION: 95 %

## 2019-04-23 DIAGNOSIS — M51.36 DDD (DEGENERATIVE DISC DISEASE), LUMBAR: Primary | ICD-10-CM

## 2019-04-23 PROCEDURE — 99213 OFFICE O/P EST LOW 20 MIN: CPT | Performed by: ANESTHESIOLOGY

## 2019-04-23 NOTE — PROGRESS NOTES
Name: Tamra Zamudio   : 10/14/1980   DOS: 2019     Pain Clinic Follow Up Visit:   Patient presents with: Worker's Comp: Bilateral low back pain, accident at work last week. last dose Tramadol Friday, last dose norco .        Hunt Amna mood & affect, concentration & attention span intact. Inspection:  Ambulates with well-coordinated, fluid, non-antalgic gait. Gait is normal.  Neck: Full range of motion  Back: Tenderness to palpation along the low back. Facet loading is positive.   Str

## 2019-05-02 ENCOUNTER — HOSPITAL ENCOUNTER (OUTPATIENT)
Dept: MRI IMAGING | Facility: HOSPITAL | Age: 39
Discharge: HOME | End: 2019-05-02
Attending: ANESTHESIOLOGY
Payer: OTHER MISCELLANEOUS

## 2019-05-02 DIAGNOSIS — M51.36 DDD (DEGENERATIVE DISC DISEASE), LUMBAR: ICD-10-CM

## 2019-05-02 PROCEDURE — 72148 MRI LUMBAR SPINE W/O DYE: CPT | Performed by: ANESTHESIOLOGY

## 2019-05-03 ENCOUNTER — TELEPHONE (OUTPATIENT)
Dept: SURGERY | Facility: CLINIC | Age: 39
End: 2019-05-03

## 2019-05-03 DIAGNOSIS — M54.16 LUMBAR RADICULITIS: Primary | ICD-10-CM

## 2019-05-06 NOTE — TELEPHONE ENCOUNTER
Spoke w/ pt and informed pt of procedure scheduling process. Informed pt once recommended procedure and plan of care verified, PA team would be notified of procedure recommendations.   Once feedback from insurance provider received, informed pt she will re

## 2019-05-14 NOTE — TELEPHONE ENCOUNTER
Created [facility] referral [id number]  AARON [id number]  Uploaded clinical notes  Case pending

## 2019-05-20 NOTE — TELEPHONE ENCOUNTER
Called patient and scheduled procedure. Instructions below reviewed including fasting, NSAID hold and need for . Patient verbalized understanding.     [address]  PRE-PROCEDURE INSTRUCTIONS WITH IV SEDATION        Appointment Date: 5/ ? Please note-No prescriptions will be written by Pain Clinic in OR on the day of procedure. If you require a refill of medications, please contact the office 48 hours prior to your procedure.   ? If you have an implanted Spinal Cord or Peripheral Nerve Sti Please schedule a follow up visit within 2 to 4 weeks after your last procedure date   Please call our office with any questions or concerns before or after your procedure at [phone].   If you are a diabetic, please increase the frequency of your The epidural injection consists of a local anesthetic, and a steroid. Will the epidural injection hurt? The epidural injection involves inserting a needle through skin and deeper tissues. There is some pain involved.  However the skin and deeper tissues How many epidural injections do I need to have? If the first epidural injection does not relieve your symptoms within one week, a second injection maybe recommended.  Similarly, if the second epidural injection does not completely relieve your symptoms in

## 2019-05-22 ENCOUNTER — APPOINTMENT (OUTPATIENT)
Dept: GENERAL RADIOLOGY | Facility: HOSPITAL | Age: 39
End: 2019-05-22
Attending: ANESTHESIOLOGY
Payer: OTHER MISCELLANEOUS

## 2019-05-22 ENCOUNTER — HOSPITAL ENCOUNTER (OUTPATIENT)
Facility: HOSPITAL | Age: 39
Setting detail: HOSPITAL OUTPATIENT SURGERY
Discharge: HOME | End: 2019-05-22
Attending: ANESTHESIOLOGY | Admitting: ANESTHESIOLOGY
Payer: OTHER MISCELLANEOUS

## 2019-05-22 VITALS
RESPIRATION RATE: 18 BRPM | TEMPERATURE: 97 F | DIASTOLIC BLOOD PRESSURE: 83 MMHG | OXYGEN SATURATION: 98 % | SYSTOLIC BLOOD PRESSURE: 121 MMHG | HEART RATE: 67 BPM

## 2019-05-22 DIAGNOSIS — M54.16 LUMBAR RADICULITIS: ICD-10-CM

## 2019-05-22 PROCEDURE — 3E0R3KZ INTRODUCTION OF OTHER DIAGNOSTIC SUBSTANCE INTO SPINAL CANAL, PERCUTANEOUS APPROACH: ICD-10-PCS | Performed by: ANESTHESIOLOGY

## 2019-05-22 PROCEDURE — 99152 MOD SED SAME PHYS/QHP 5/>YRS: CPT | Performed by: ANESTHESIOLOGY

## 2019-05-22 PROCEDURE — 3E0R33Z INTRODUCTION OF ANTI-INFLAMMATORY INTO SPINAL CANAL, PERCUTANEOUS APPROACH: ICD-10-PCS | Performed by: ANESTHESIOLOGY

## 2019-05-22 PROCEDURE — 81025 URINE PREGNANCY TEST: CPT | Performed by: ANESTHESIOLOGY

## 2019-05-22 RX ORDER — ONDANSETRON 2 MG/ML
4 INJECTION INTRAMUSCULAR; INTRAVENOUS ONCE AS NEEDED
Status: DISCONTINUED | OUTPATIENT
Start: 2019-05-22 | End: 2019-05-22

## 2019-05-22 RX ORDER — LIDOCAINE HYDROCHLORIDE 10 MG/ML
INJECTION, SOLUTION EPIDURAL; INFILTRATION; INTRACAUDAL; PERINEURAL AS NEEDED
Status: DISCONTINUED | OUTPATIENT
Start: 2019-05-22 | End: 2019-05-22

## 2019-05-22 RX ORDER — 0.9 % SODIUM CHLORIDE 0.9 %
VIAL (ML) INJECTION AS NEEDED
Status: DISCONTINUED | OUTPATIENT
Start: 2019-05-22 | End: 2019-05-22

## 2019-05-22 RX ORDER — DEXAMETHASONE SODIUM PHOSPHATE 10 MG/ML
INJECTION, SOLUTION INTRAMUSCULAR; INTRAVENOUS AS NEEDED
Status: DISCONTINUED | OUTPATIENT
Start: 2019-05-22 | End: 2019-05-22

## 2019-05-22 RX ORDER — DIPHENHYDRAMINE HYDROCHLORIDE 50 MG/ML
50 INJECTION INTRAMUSCULAR; INTRAVENOUS ONCE AS NEEDED
Status: DISCONTINUED | OUTPATIENT
Start: 2019-05-22 | End: 2019-05-22

## 2019-05-22 RX ORDER — SODIUM CHLORIDE, SODIUM LACTATE, POTASSIUM CHLORIDE, CALCIUM CHLORIDE 600; 310; 30; 20 MG/100ML; MG/100ML; MG/100ML; MG/100ML
100 INJECTION, SOLUTION INTRAVENOUS CONTINUOUS
Status: DISCONTINUED | OUTPATIENT
Start: 2019-05-22 | End: 2019-05-22

## 2019-05-22 RX ORDER — MIDAZOLAM HYDROCHLORIDE 1 MG/ML
INJECTION INTRAMUSCULAR; INTRAVENOUS AS NEEDED
Status: DISCONTINUED | OUTPATIENT
Start: 2019-05-22 | End: 2019-05-22

## 2019-05-22 NOTE — OPERATIVE REPORT
[facility]  Operative Report  2019     Yeni Ledezma Patient Status:  Hospital Outpatient Surgery    10/14/1980 MRN [id number]   [facility] ENDOSCOPY Attending Sid Lees MD   Hosp Day # 0 PCP DO Andressa Steve The needle was flushed with 1 mL lidocaine. The needle was withdrawn with the stylet intact in situ. The needle's tip was intact. The patient tolerated the procedure very well without significant immediate complication.   The patient's back was cleaned

## 2019-05-22 NOTE — H&P
History & Physical Examination    Patient Name: Juvenal Muniz  MRN: [id number]  CSN: [id number]  YOB: 1980    Pre-Operative Diagnosis:  Lumbar radiculitis [M54.16]    Present Illness: Patient with lumbar radiculopathy here for epidural INTERLAMINAR EPIDURAL INJECTION N/A 3/26/2018    Performed by Ramesh Key MD at [facility] MAIN OR   • ORAL SURGERY PROCEDURE      wisdom tooth resection   • OTHER  4//2013    d&C   • RADIOFREQUENCY ABLATION OF THORACIC OR LUMBAR MEDIAL BRANCH Left 6/11/2018    P

## 2019-09-30 RX ORDER — BUPROPION HYDROCHLORIDE 150 MG/1
TABLET, EXTENDED RELEASE ORAL
Qty: 60 TABLET | Refills: 0 | Status: SHIPPED | OUTPATIENT
Start: 2019-09-30 | End: 2019-10-14

## 2019-10-14 ENCOUNTER — OFFICE VISIT (OUTPATIENT)
Dept: FAMILY MEDICINE CLINIC | Facility: CLINIC | Age: 39
End: 2019-10-14

## 2019-10-14 VITALS
TEMPERATURE: 98 F | HEART RATE: 109 BPM | BODY MASS INDEX: 38.49 KG/M2 | OXYGEN SATURATION: 97 % | WEIGHT: 231 LBS | DIASTOLIC BLOOD PRESSURE: 84 MMHG | SYSTOLIC BLOOD PRESSURE: 130 MMHG | HEIGHT: 65 IN | RESPIRATION RATE: 16 BRPM

## 2019-10-14 DIAGNOSIS — Z00.00 ROUTINE GENERAL MEDICAL EXAMINATION AT A HEALTH CARE FACILITY: Primary | ICD-10-CM

## 2019-10-14 DIAGNOSIS — G25.81 RLS (RESTLESS LEGS SYNDROME): ICD-10-CM

## 2019-10-14 DIAGNOSIS — L98.9 SKIN LESION: ICD-10-CM

## 2019-10-14 DIAGNOSIS — G47.9 DIFFICULTY SLEEPING: ICD-10-CM

## 2019-10-14 DIAGNOSIS — L98.8 LESION OF SKIN OF BREAST: ICD-10-CM

## 2019-10-14 DIAGNOSIS — Z23 NEED FOR VACCINATION: ICD-10-CM

## 2019-10-14 DIAGNOSIS — R05.9 COUGH: ICD-10-CM

## 2019-10-14 DIAGNOSIS — Z30.09 COUNSELING FOR BIRTH CONTROL REGARDING INTRAUTERINE DEVICE (IUD): ICD-10-CM

## 2019-10-14 PROCEDURE — 99395 PREV VISIT EST AGE 18-39: CPT | Performed by: PHYSICIAN ASSISTANT

## 2019-10-14 PROCEDURE — 90471 IMMUNIZATION ADMIN: CPT | Performed by: FAMILY MEDICINE

## 2019-10-14 PROCEDURE — 90686 IIV4 VACC NO PRSV 0.5 ML IM: CPT | Performed by: FAMILY MEDICINE

## 2019-10-14 RX ORDER — CLONAZEPAM 0.5 MG/1
0.5 TABLET, ORALLY DISINTEGRATING ORAL NIGHTLY PRN
Qty: 30 TABLET | Refills: 0 | Status: SHIPPED | OUTPATIENT
Start: 2019-10-14 | End: 2019-12-23 | Stop reason: ALTCHOICE

## 2019-10-14 RX ORDER — BUPROPION HYDROCHLORIDE 150 MG/1
TABLET, EXTENDED RELEASE ORAL
Qty: 180 TABLET | Refills: 3 | Status: SHIPPED | OUTPATIENT
Start: 2019-10-14 | End: 2020-05-08

## 2019-10-14 RX ORDER — CODEINE PHOSPHATE AND GUAIFENESIN 10; 100 MG/5ML; MG/5ML
SOLUTION ORAL NIGHTLY
Qty: 180 ML | Refills: 0 | Status: SHIPPED | OUTPATIENT
Start: 2019-10-14 | End: 2019-11-26

## 2019-10-14 NOTE — PROGRESS NOTES
HPI:    Patient ID: Carter Higginbotham is a 44year old female. HPI   Patient presents today requesting physical exam. Overall feeling okay. Has concerns today about a few things. Raised skin lesion right nose that has been present for 6 months.  R Gastrointestinal: Negative for nausea, vomiting, abdominal pain and diarrhea. Genitourinary: Negative for dysuria, frequency and hematuria. Musculoskeletal: Negative for myalgias, joint pain and gait problem.         Pain and urge to move the legs at ni Head: Normocephalic and atraumatic.    Right Ear: Tympanic membrane and external ear normal.   Left Ear: Tympanic membrane and external ear normal.   Nose: Nose normal.   Mouth/Throat: Oropharynx is clear and moist.   Post nasal drip   Eyes: Pupils are [place] Patient here with active issues as below. Physical exam findings as documented. Check fasting labs and contact patient with results. Health maintenance issues reviewed. Check records for last Tdap booster. Encouraged healthy diet and regular exercise.  Nato Abrams Likely healing sebaceous cyst. No other breast abnormalities on exam. Continue to monitor. Keep the area clean and dry. Contact the office if symptoms worsen or recur. 9. Cough  Cough for 2 weeks.  Exam notable for diffuse wheezing and mildly diminished

## 2019-11-14 ENCOUNTER — OFFICE VISIT (OUTPATIENT)
Dept: OBGYN CLINIC | Facility: CLINIC | Age: 39
End: 2019-11-14

## 2019-11-14 VITALS
TEMPERATURE: 98 F | WEIGHT: 233 LBS | DIASTOLIC BLOOD PRESSURE: 88 MMHG | SYSTOLIC BLOOD PRESSURE: 122 MMHG | HEIGHT: 65 IN | RESPIRATION RATE: 16 BRPM | BODY MASS INDEX: 38.82 KG/M2 | HEART RATE: 80 BPM

## 2019-11-14 DIAGNOSIS — N92.6 IRREGULAR MENSES: Primary | ICD-10-CM

## 2019-11-14 DIAGNOSIS — Z30.09 COUNSELING FOR BIRTH CONTROL REGARDING INTRAUTERINE DEVICE (IUD): ICD-10-CM

## 2019-11-14 PROCEDURE — 99213 OFFICE O/P EST LOW 20 MIN: CPT | Performed by: NURSE PRACTITIONER

## 2019-11-14 NOTE — PROGRESS NOTES
Gabriel Ji is a 44year old female. Patient's last menstrual period was 10/07/2019 (exact date). Patient presents with:   Other:  discuss IUD    She denies a h/o thromboembolic events, smoking, thrombogenic mutations, DM, HTN, cancer, heart di 6/11/2018    Performed by Millie Vilchis MD at [facility] MAIN OR   • [address] [facility] M-30 Right 6/4/2018    Performed by Millie Vilchis MD at [facility] MAIN OR   • TRANSFORAMINAL LUMBAR EPIDURAL STEROID INJECTION SINGLE LEVEL Right 11/ placement.      Diagnoses and all orders for this visit:    Irregular menses    Counseling for birth control regarding intrauterine device (IUD)

## 2019-11-23 ENCOUNTER — TELEPHONE (OUTPATIENT)
Dept: FAMILY MEDICINE CLINIC | Facility: CLINIC | Age: 39
End: 2019-11-23

## 2019-11-23 RX ORDER — CLONAZEPAM 1 MG/1
1 TABLET ORAL NIGHTLY PRN
Qty: 30 TABLET | Refills: 0 | Status: SHIPPED | OUTPATIENT
Start: 2019-11-23 | End: 2019-12-23

## 2019-11-23 NOTE — TELEPHONE ENCOUNTER
Patient was inquiring if the clonazepam could be increased to 1mg or if LR would want to switch her to a different medication. Patient states she definitely needs something to help her sleep at night. Please advise.

## 2019-11-23 NOTE — TELEPHONE ENCOUNTER
Yes we can try 1 mg nightly if needed. If no improvement in a week or two I would recommend trying something else. Rx sent.

## 2019-11-26 ENCOUNTER — OFFICE VISIT (OUTPATIENT)
Dept: OBGYN CLINIC | Facility: CLINIC | Age: 39
End: 2019-11-26

## 2019-11-26 VITALS
HEART RATE: 80 BPM | RESPIRATION RATE: 12 BRPM | HEIGHT: 65 IN | BODY MASS INDEX: 40.48 KG/M2 | SYSTOLIC BLOOD PRESSURE: 122 MMHG | WEIGHT: 243 LBS | TEMPERATURE: 98 F | DIASTOLIC BLOOD PRESSURE: 78 MMHG

## 2019-11-26 DIAGNOSIS — Z30.430 ENCOUNTER FOR INSERTION OF INTRAUTERINE CONTRACEPTIVE DEVICE: Primary | ICD-10-CM

## 2019-11-26 PROCEDURE — 58300 INSERT INTRAUTERINE DEVICE: CPT | Performed by: NURSE PRACTITIONER

## 2019-11-26 PROCEDURE — 81025 URINE PREGNANCY TEST: CPT | Performed by: NURSE PRACTITIONER

## 2019-12-23 ENCOUNTER — OFFICE VISIT (OUTPATIENT)
Dept: OBGYN CLINIC | Facility: CLINIC | Age: 39
End: 2019-12-23

## 2019-12-23 VITALS
BODY MASS INDEX: 39.32 KG/M2 | HEART RATE: 104 BPM | WEIGHT: 236 LBS | DIASTOLIC BLOOD PRESSURE: 80 MMHG | SYSTOLIC BLOOD PRESSURE: 136 MMHG | HEIGHT: 65 IN

## 2019-12-23 DIAGNOSIS — N92.6 IRREGULAR MENSES: ICD-10-CM

## 2019-12-23 DIAGNOSIS — Z30.431 SURVEILLANCE OF (INTRAUTERINE) CONTRACEPTIVE DEVICE: Primary | ICD-10-CM

## 2019-12-23 PROCEDURE — 99213 OFFICE O/P EST LOW 20 MIN: CPT | Performed by: NURSE PRACTITIONER

## 2019-12-23 RX ORDER — CLONAZEPAM 1 MG/1
1 TABLET ORAL NIGHTLY PRN
Qty: 30 TABLET | Refills: 0 | Status: SHIPPED | OUTPATIENT
Start: 2019-12-23 | End: 2020-01-30

## 2019-12-23 NOTE — TELEPHONE ENCOUNTER
Rx Request  clonazePAM 1 MG Oral Tab    Disp:       30             R: 0    Last Visit: 10/14/2019    Last Refilled: 11/23/2019

## 2019-12-23 NOTE — PROGRESS NOTES
Jose Phan is a 44year old female. HPI:   Patient presents with: Follow - Up: Mirena placed 11/26/19. Pt is here for IUD follow up. Denies any pelvic pain or abn discharge since placement. . Pt's menses were irregular prior to placement INJECTION N/A 3/26/2018    Performed by Romel Tompkins MD at [facility] MAIN OR   • ORAL SURGERY PROCEDURE      wisdom tooth resection   • OTHER  4//2013    d&C   • RADIOFREQUENCY ABLATION OF THORACIC OR LUMBAR MEDIAL BRANCH Left 6/11/2018    Performed by Romel Tompkins

## 2019-12-30 ENCOUNTER — OFFICE VISIT (OUTPATIENT)
Dept: FAMILY MEDICINE CLINIC | Facility: CLINIC | Age: 39
End: 2019-12-30

## 2019-12-30 VITALS
WEIGHT: 236 LBS | HEIGHT: 65 IN | SYSTOLIC BLOOD PRESSURE: 120 MMHG | OXYGEN SATURATION: 98 % | HEART RATE: 80 BPM | BODY MASS INDEX: 39.32 KG/M2 | DIASTOLIC BLOOD PRESSURE: 80 MMHG | RESPIRATION RATE: 18 BRPM | TEMPERATURE: 99 F

## 2019-12-30 DIAGNOSIS — H00.015 HORDEOLUM EXTERNUM OF LEFT LOWER EYELID: Primary | ICD-10-CM

## 2019-12-30 PROCEDURE — 99213 OFFICE O/P EST LOW 20 MIN: CPT | Performed by: FAMILY MEDICINE

## 2019-12-30 RX ORDER — CIPROFLOXACIN HYDROCHLORIDE 3.5 MG/ML
2 SOLUTION/ DROPS TOPICAL EVERY 4 HOURS
Qty: 1 BOTTLE | Refills: 0 | Status: SHIPPED | OUTPATIENT
Start: 2019-12-30 | End: 2020-03-17

## 2019-12-31 NOTE — PROGRESS NOTES
HPI:   Juvenal Muniz is a 44year old female who presents for left eye concerns    Stye in left eye for 3 weeks   Not getting better  No light sensitivity   No vision changes      Current Outpatient Medications   Medication Sig Dispense Refill   • c ABLATION OF THORACIC OR LUMBAR MEDIAL BRANCH Right 6/4/2018    Performed by Bryan Cho MD at [address]   • TRANSFORAMINAL LUMBAR EPIDURAL STEROID INJECTION SINGLE LEVEL Right 11/27/2017    Performed by Bryan Cho MD at [facility] MAIN OR   • TRANSFORAMINAL MARISSA no fluctuance, no surrounding warmth or redness     ASSESSMENT AND PLAN:   Brayan Watts is a 44year old female who presents for     1.  Hordeolum externum of left lower eyelid  Warm compress   - OPHTHALMOLOGY - INTERNAL  - ciprofloxacin HCl 0.3 % O

## 2020-01-15 ENCOUNTER — TELEPHONE (OUTPATIENT)
Dept: OBGYN CLINIC | Facility: CLINIC | Age: 40
End: 2020-01-15

## 2020-01-15 ENCOUNTER — TELEPHONE (OUTPATIENT)
Dept: FAMILY MEDICINE CLINIC | Facility: CLINIC | Age: 40
End: 2020-01-15

## 2020-01-15 DIAGNOSIS — H00.015 HORDEOLUM EXTERNUM OF LEFT LOWER EYELID: Primary | ICD-10-CM

## 2020-01-15 NOTE — TELEPHONE ENCOUNTER
If pt is concerned it might have fallen out or is misplaced, she should make an appt to be seen. Was not bleeding at her follow up appt in December 2019. If no contraindications, can try Ibuprofen 600mg q 8hrs for 5 days.  This can help stop the bleeding, o

## 2020-01-15 NOTE — TELEPHONE ENCOUNTER
PC with patient. Had Mirena IUD inserted in November 2019. Bleed heavily for the first 3 to 4 weeks. Bleeding has been on going since then but is mild. Once per day. Changes tampon She has not checked for the string. Concerned.

## 2020-01-15 NOTE — TELEPHONE ENCOUNTER
Patient had a referral for OPHTHALMOLOGY to Dr Samara Martinez.   She has already had one appt with her and Dr prescribed an antibiotic for her but it is not helping so  said that next step will be removal.    Patient is requesting referral with additional vi

## 2020-01-16 NOTE — TELEPHONE ENCOUNTER
Patient aware of AARON Bejarano NP recommendations.  She will try Ibuprofen and if no improvement will consider scheduling appointment for evaluation

## 2020-01-16 NOTE — TELEPHONE ENCOUNTER
Patient aware of AARON Hope NP recommendations. She will try Ibuprofen and if no improvement will consider scheduling appointment for evaluation.

## 2020-01-16 NOTE — TELEPHONE ENCOUNTER
----- Message from [facility] sent at 1/15/2020  3:50 PM CST -----  PT returned call. Was holding for [facility] to get advise, hung up.

## 2020-01-24 ENCOUNTER — LAB REQUISITION (OUTPATIENT)
Dept: LAB | Facility: HOSPITAL | Age: 40
End: 2020-01-24
Payer: COMMERCIAL

## 2020-01-24 DIAGNOSIS — H00.15 CHALAZION LEFT LOWER EYELID: ICD-10-CM

## 2020-01-24 PROCEDURE — 88305 TISSUE EXAM BY PATHOLOGIST: CPT | Performed by: INTERNAL MEDICINE

## 2020-01-30 RX ORDER — CLONAZEPAM 1 MG/1
TABLET ORAL
Qty: 30 TABLET | Refills: 0 | OUTPATIENT
Start: 2020-01-30

## 2020-01-31 RX ORDER — CLONAZEPAM 1 MG/1
1 TABLET ORAL NIGHTLY PRN
Qty: 30 TABLET | Refills: 0 | Status: SHIPPED | OUTPATIENT
Start: 2020-01-31 | End: 2020-03-10

## 2020-01-31 NOTE — TELEPHONE ENCOUNTER
Patient is requesting a refill of clonazePAM 1 MG Oral Tab. States that last month she had seen LR since she could not see LE however the script was sent by [address]. Please advise.

## 2020-02-01 RX ORDER — CLONAZEPAM 1 MG/1
1 TABLET ORAL NIGHTLY PRN
Qty: 30 TABLET | Refills: 0 | OUTPATIENT
Start: 2020-02-01

## 2020-02-20 ENCOUNTER — TELEPHONE (OUTPATIENT)
Dept: FAMILY MEDICINE CLINIC | Facility: CLINIC | Age: 40
End: 2020-02-20

## 2020-02-20 DIAGNOSIS — H00.016 HORDEOLUM EXTERNUM OF LEFT EYE, UNSPECIFIED EYELID: Primary | ICD-10-CM

## 2020-02-20 NOTE — TELEPHONE ENCOUNTER
Patient had a referral for ophthalmology - Dr. Javier Chua and had a Hordeolum externum of left lower eyelid and had it removed. Apparently it has come back and patient needs a new referral.  States she called the referral department yesterday.       She

## 2020-02-20 NOTE — TELEPHONE ENCOUNTER
Pt has chalazion on her eyelid again, had it removed by Dr. Don James, but would like to go to someone who can put her under. Any other opthamologists?

## 2020-02-26 NOTE — TELEPHONE ENCOUNTER
Spoke to patient - she will see , per their office they numb the area. Referral entered. Patient will call for something to relax her if needed once she sees Dr. Joseluis Eaton for a consult.

## 2020-03-10 RX ORDER — CLONAZEPAM 1 MG/1
1 TABLET ORAL NIGHTLY PRN
Qty: 5 TABLET | Refills: 0 | Status: SHIPPED | OUTPATIENT
Start: 2020-03-10 | End: 2020-03-12

## 2020-03-10 NOTE — TELEPHONE ENCOUNTER
PT OUT OF CLONAZEPAM AND NEEDS REFILL TODAY. HAS APPT WITH LR ON Thursday. JONATHAN  ON FILE. PLS CALL HER IF ANY ISSUES REFILLING TODAY.

## 2020-03-12 ENCOUNTER — OFFICE VISIT (OUTPATIENT)
Dept: FAMILY MEDICINE CLINIC | Facility: CLINIC | Age: 40
End: 2020-03-12

## 2020-03-12 VITALS
HEIGHT: 65 IN | DIASTOLIC BLOOD PRESSURE: 80 MMHG | HEART RATE: 104 BPM | OXYGEN SATURATION: 98 % | SYSTOLIC BLOOD PRESSURE: 128 MMHG | WEIGHT: 236 LBS | BODY MASS INDEX: 39.32 KG/M2 | RESPIRATION RATE: 18 BRPM

## 2020-03-12 DIAGNOSIS — G47.19 EXCESSIVE DAYTIME SLEEPINESS: Primary | ICD-10-CM

## 2020-03-12 DIAGNOSIS — R39.15 URINARY URGENCY: ICD-10-CM

## 2020-03-12 PROCEDURE — 99214 OFFICE O/P EST MOD 30 MIN: CPT | Performed by: PHYSICIAN ASSISTANT

## 2020-03-12 RX ORDER — CLONAZEPAM 1 MG/1
1 TABLET ORAL NIGHTLY PRN
Qty: 30 TABLET | Refills: 2 | Status: SHIPPED | OUTPATIENT
Start: 2020-03-12 | End: 2020-06-10

## 2020-03-12 NOTE — PROGRESS NOTES
HPI:    Patient ID: Maribell Lara is a 44year old female. HPI   Patient here in follow-up of a couple of issues. She has history of restless leg syndrome.   She was restarted on clonazepam 1 mg nightly which has been working very well to control needed. 30 tablet 2   • ciprofloxacin HCl 0.3 % Ophthalmic Solution Place 2 drops into the left eye every 4 (four) hours.  1 Bottle 0   • buPROPion HCl ER, SR, 150 MG Oral Tablet 12 Hr TAKE 1 TABLET(150 MG) BY MOUTH TWICE DAILY 180 tablet 3   • Albuterol Isidro FLOOR THERAPY - INTERNAL      No orders of the defined types were placed in this encounter.       Meds This Visit:  Requested Prescriptions     Signed Prescriptions Disp Refills   • clonazePAM 1 MG Oral Tab 30 tablet 2     Sig: Take 1 tablet (1 mg total) by

## 2020-03-17 ENCOUNTER — OFFICE VISIT (OUTPATIENT)
Dept: OBGYN CLINIC | Facility: CLINIC | Age: 40
End: 2020-03-17

## 2020-03-17 VITALS
HEIGHT: 65 IN | HEART RATE: 80 BPM | DIASTOLIC BLOOD PRESSURE: 80 MMHG | BODY MASS INDEX: 40.48 KG/M2 | SYSTOLIC BLOOD PRESSURE: 130 MMHG | RESPIRATION RATE: 12 BRPM | TEMPERATURE: 99 F | WEIGHT: 243 LBS

## 2020-03-17 DIAGNOSIS — L02.92 FURUNCLE: Primary | ICD-10-CM

## 2020-03-17 DIAGNOSIS — L08.9 SKIN INFECTION: ICD-10-CM

## 2020-03-17 PROCEDURE — 56405 I&D VULVA/PERINEAL ABSCESS: CPT | Performed by: NURSE PRACTITIONER

## 2020-03-17 PROCEDURE — 99213 OFFICE O/P EST LOW 20 MIN: CPT | Performed by: NURSE PRACTITIONER

## 2020-03-17 NOTE — PROGRESS NOTES
Ivanna Perrin is a 44year old female. HPI:   Patient presents with: Other: Possible ingrown hair in vaginal area    Pt reports she has an ingrown hair or boil on her vulva area.  It started as a small \"bump\" and now has turned \"purple and squ at [address] [facility] ENDOSCOPY   • LUMBAR INTERLAMINAR EPIDURAL INJECTION N/A 9/17/2018    Performed by Maritza Hannah MD at [address] [place] [address]   • LUMBAR INTERLAMINAR EPIDURAL INJECTION N/A 3/26/2018    Performed by Maritza Hannah MD at [address] [place] [address] Dr   • [address] [place] [address] compresses 2-3 times daily for 15 minutes recommended. Keep area clean and dry. Apply Bacitracin at least twice per day and always after compresses. Discussed topical antibiotic appropriate at this time.  Call if symptoms worsen, boil enlarges or becomes i

## 2020-04-06 ENCOUNTER — OFFICE VISIT (OUTPATIENT)
Dept: SLEEP CENTER | Age: 40
End: 2020-04-06
Attending: INTERNAL MEDICINE
Payer: COMMERCIAL

## 2020-04-06 DIAGNOSIS — G47.19 EXCESSIVE DAYTIME SLEEPINESS: ICD-10-CM

## 2020-04-06 PROCEDURE — 95810 POLYSOM 6/> YRS 4/> PARAM: CPT

## 2020-04-09 NOTE — PROCEDURES
[address]       Accredited by the [facility] of [facility] Medicine ([facility]ASM)    PATIENT'S NAME:        Lori Cohn  ATTENDING PHYSICIAN:   Tula Severs, M.D. REFERRING PHYSICIAN:   GAY Contreras sleep time was 380 minutes, for a sleep efficiency of 90% which is within expected norms. Sleep onset latency was decreased at 8.6 minutes. Wake after sleep onset was normal at 35.5 minutes.   Sleep was not significantly fragmented during this night's danni [facility] [id number]  AK/    cc: RUBEN Weiss M.D. Laveta Gilbert, D.O.

## 2020-04-10 DIAGNOSIS — G47.33 OSA (OBSTRUCTIVE SLEEP APNEA): Primary | ICD-10-CM

## 2020-05-08 ENCOUNTER — VIRTUAL PHONE E/M (OUTPATIENT)
Dept: FAMILY MEDICINE CLINIC | Facility: CLINIC | Age: 40
End: 2020-05-08

## 2020-05-08 VITALS — HEIGHT: 65 IN | BODY MASS INDEX: 37.65 KG/M2 | WEIGHT: 226 LBS

## 2020-05-08 DIAGNOSIS — F41.9 ANXIETY: ICD-10-CM

## 2020-05-08 DIAGNOSIS — E66.01 CLASS 2 SEVERE OBESITY DUE TO EXCESS CALORIES WITH SERIOUS COMORBIDITY AND BODY MASS INDEX (BMI) OF 37.0 TO 37.9 IN ADULT (HCC): Primary | ICD-10-CM

## 2020-05-08 PROBLEM — E66.812 CLASS 2 SEVERE OBESITY DUE TO EXCESS CALORIES WITH SERIOUS COMORBIDITY AND BODY MASS INDEX (BMI) OF 37.0 TO 37.9 IN ADULT (HCC): Status: ACTIVE | Noted: 2020-05-08

## 2020-05-08 PROCEDURE — 99214 OFFICE O/P EST MOD 30 MIN: CPT | Performed by: PHYSICIAN ASSISTANT

## 2020-05-08 NOTE — PATIENT INSTRUCTIONS
Griselda Panning, MD [phone]     This is the physician to contact to discuss sleep apnea

## 2020-05-08 NOTE — PROGRESS NOTES
Virtual Telephone Check-In    Pamela Ford verbally consents to a Virtual/Telephone Check-In visit on 05/08/20.     Patient understands and accepts financial responsibility for any deductible, co-insurance and/or co-pays associated with this service Past Medical History:   Diagnosis Date   • Anxiety    • Cervicalgia    • Depression    • Depressive disorder, not elsewhere classified    • Dysmenorrhea    • Dysphonia    • Major depressive disorder, recurrent episode, mild (HCC)    • Pain in joint, pelv considerable time discussing diet and making modifications. I believe her weight loss has plateaued because she is not consuming enough calories and many of the calories she is consuming are from fats.   I advised her to increase her caloric intake to 1800

## 2020-05-11 ENCOUNTER — TELEPHONE (OUTPATIENT)
Dept: FAMILY MEDICINE CLINIC | Facility: CLINIC | Age: 40
End: 2020-05-11

## 2020-05-11 ENCOUNTER — APPOINTMENT (OUTPATIENT)
Dept: LAB | Age: 40
End: 2020-05-11
Attending: PHYSICIAN ASSISTANT
Payer: COMMERCIAL

## 2020-05-11 DIAGNOSIS — E66.01 CLASS 2 SEVERE OBESITY DUE TO EXCESS CALORIES WITH SERIOUS COMORBIDITY AND BODY MASS INDEX (BMI) OF 37.0 TO 37.9 IN ADULT (HCC): ICD-10-CM

## 2020-05-11 PROCEDURE — 93010 ELECTROCARDIOGRAM REPORT: CPT | Performed by: INTERNAL MEDICINE

## 2020-05-11 PROCEDURE — 93005 ELECTROCARDIOGRAM TRACING: CPT

## 2020-05-11 NOTE — TELEPHONE ENCOUNTER
CALLING TO SEE IF LR PUT IN ORDERS FOR HER EKG. SHE WOULD LIKE TO DO THIS SO LR CAN RX FOR HER A MED THAT IS NEEDED. PLS  CALL BACK TO ADVISE.

## 2020-05-11 NOTE — TELEPHONE ENCOUNTER
Spoke to patient. EKG is ordered. Advised to call central scheduling to set this up and we will follow-up pending results.

## 2020-05-12 ENCOUNTER — TELEPHONE (OUTPATIENT)
Dept: FAMILY MEDICINE CLINIC | Facility: CLINIC | Age: 40
End: 2020-05-12

## 2020-05-13 RX ORDER — PHENTERMINE HYDROCHLORIDE 37.5 MG/1
37.5 CAPSULE ORAL EVERY MORNING
Qty: 30 CAPSULE | Refills: 2 | Status: SHIPPED | OUTPATIENT
Start: 2020-05-13 | End: 2020-08-26

## 2020-05-17 ENCOUNTER — PATIENT MESSAGE (OUTPATIENT)
Dept: FAMILY MEDICINE CLINIC | Facility: CLINIC | Age: 40
End: 2020-05-17

## 2020-05-17 DIAGNOSIS — M79.672 LEFT FOOT PAIN: Primary | ICD-10-CM

## 2020-05-18 NOTE — TELEPHONE ENCOUNTER
From: Dayna Ledezma  To: Keely Grayson PA-C  Sent: 5/17/2020 6:20 PM CDT  Subject: Non-Urgent Medical Question    Hi again! So, the meds are working and I've been trying to eat more and continue my exercise.  I've lost 3 more lbs, but I feel as thou

## 2020-05-19 ENCOUNTER — HOSPITAL ENCOUNTER (OUTPATIENT)
Dept: GENERAL RADIOLOGY | Age: 40
Discharge: HOME | End: 2020-05-19
Attending: PHYSICIAN ASSISTANT
Payer: COMMERCIAL

## 2020-05-19 DIAGNOSIS — M79.672 LEFT FOOT PAIN: ICD-10-CM

## 2020-05-19 PROCEDURE — 73630 X-RAY EXAM OF FOOT: CPT | Performed by: PHYSICIAN ASSISTANT

## 2020-06-10 ENCOUNTER — TELEPHONE (OUTPATIENT)
Dept: OBGYN CLINIC | Facility: CLINIC | Age: 40
End: 2020-06-10

## 2020-06-10 RX ORDER — CLONAZEPAM 1 MG/1
1 TABLET ORAL NIGHTLY PRN
Qty: 30 TABLET | Refills: 2 | Status: SHIPPED | OUTPATIENT
Start: 2020-06-10 | End: 2020-07-21 | Stop reason: ALTCHOICE

## 2020-06-10 NOTE — TELEPHONE ENCOUNTER
PC with patient. Mirena inserted 11/26/2019. Has been bleeding ever since inserted. Had maybe 2 weeks of no bleeding. Changes tampon 1 to 2 times per day and 1 time at night. Sometimes when bleeding is heaver, changes 4 to 5 times per day.  She thought it w

## 2020-06-10 NOTE — TELEPHONE ENCOUNTER
----- [facility] sent at 6/9/2020 11:55 AM CDT -----  Regarding: Visit Follow-up Question  Contact: [phone]  Hi, there. I had an IUD inserted in November. In December/January I reported that I was still continuing to bleed.   Low Davis

## 2020-06-10 NOTE — TELEPHONE ENCOUNTER
PC with patient. Aware per provider,usually does not bleed this long. Needs appointment to evaluate IUD placement. Scheduled for 6/12/2020.

## 2020-06-12 ENCOUNTER — OFFICE VISIT (OUTPATIENT)
Dept: OBGYN CLINIC | Facility: CLINIC | Age: 40
End: 2020-06-12

## 2020-06-12 VITALS
WEIGHT: 218 LBS | HEART RATE: 76 BPM | DIASTOLIC BLOOD PRESSURE: 78 MMHG | BODY MASS INDEX: 37.22 KG/M2 | RESPIRATION RATE: 12 BRPM | SYSTOLIC BLOOD PRESSURE: 118 MMHG | TEMPERATURE: 99 F | HEIGHT: 64 IN

## 2020-06-12 DIAGNOSIS — T83.32XA INTRAUTERINE CONTRACEPTIVE DEVICE THREADS LOST, INITIAL ENCOUNTER: ICD-10-CM

## 2020-06-12 DIAGNOSIS — N93.8 DUB (DYSFUNCTIONAL UTERINE BLEEDING): Primary | ICD-10-CM

## 2020-06-12 PROCEDURE — 99203 OFFICE O/P NEW LOW 30 MIN: CPT | Performed by: OBSTETRICS & GYNECOLOGY

## 2020-06-12 NOTE — PROGRESS NOTES
CHIEF COMPLAINT:   Patient presents with  Prolong heavy bleeding   HPI:   Krysta Gold is a 44year old [id number] No LMP recorded. (Menstrual status: IUD - Intrauterine Device). who presents with placement of Mirena on November 26, 2019.   She has bee lesions or polyps. STRING NOT SEEN  uterus      WNL size, NT, mobile,   adnexa     no masses, NT    IMPRESSION/PLAN:     1. DUB (dysfunctional uterine bleeding)    - US TRANSVAG/ABDOMINAL EMG ONLY; Future    2.  Intrauterine contraceptive device threads lost

## 2020-06-12 NOTE — PROGRESS NOTES
Pt here due to irregular bleeding since placement of IUD. She states she feels as though she has been bleeding since November when it was place. She needs a tampon during the day and a pad at night.  She has had only 2 episodes where she has stopped bleed

## 2020-06-15 ENCOUNTER — ULTRASOUND ENCOUNTER (OUTPATIENT)
Dept: OBGYN CLINIC | Facility: CLINIC | Age: 40
End: 2020-06-15

## 2020-06-17 ENCOUNTER — TELEPHONE (OUTPATIENT)
Dept: OBGYN CLINIC | Facility: CLINIC | Age: 40
End: 2020-06-17

## 2020-06-17 NOTE — TELEPHONE ENCOUNTER
PC with patient. Aware of ultrasound result and IUD in place. Wants to have removed as soon as possible. She leaves on vacation 6/26/2020.

## 2020-06-19 ENCOUNTER — ANESTHESIA EVENT (OUTPATIENT)
Dept: SURGERY | Facility: HOSPITAL | Age: 40
End: 2020-06-19
Payer: COMMERCIAL

## 2020-06-19 DIAGNOSIS — N92.6 IRREGULAR BLEEDING: ICD-10-CM

## 2020-06-19 DIAGNOSIS — T83.32XA DISPLACEMENT OF INTRAUTERINE CONTRACEPTIVE DEVICE, INITIAL ENCOUNTER: Primary | ICD-10-CM

## 2020-06-19 DIAGNOSIS — N93.8 DYSFUNCTIONAL UTERINE BLEEDING: Primary | ICD-10-CM

## 2020-06-19 DIAGNOSIS — T83.32XA DISPLACEMENT OF INTRAUTERINE CONTRACEPTIVE DEVICE, INITIAL ENCOUNTER: ICD-10-CM

## 2020-06-19 NOTE — TELEPHONE ENCOUNTER
PC with patient. Aware surgery scheduled for 6/20/2020 at 9 am at [facility]. The hospital will be contacting her today. Understands.

## 2020-06-20 ENCOUNTER — ANESTHESIA (OUTPATIENT)
Dept: SURGERY | Facility: HOSPITAL | Age: 40
End: 2020-06-20
Payer: COMMERCIAL

## 2020-06-20 ENCOUNTER — HOSPITAL ENCOUNTER (OUTPATIENT)
Facility: HOSPITAL | Age: 40
Setting detail: HOSPITAL OUTPATIENT SURGERY
Discharge: HOME | End: 2020-06-20
Attending: OBSTETRICS & GYNECOLOGY | Admitting: OBSTETRICS & GYNECOLOGY
Payer: COMMERCIAL

## 2020-06-20 VITALS
HEIGHT: 64 IN | SYSTOLIC BLOOD PRESSURE: 116 MMHG | DIASTOLIC BLOOD PRESSURE: 76 MMHG | BODY MASS INDEX: 37.39 KG/M2 | WEIGHT: 219 LBS | HEART RATE: 80 BPM | TEMPERATURE: 98 F | RESPIRATION RATE: 18 BRPM | OXYGEN SATURATION: 98 %

## 2020-06-20 DIAGNOSIS — T83.32XA DISPLACEMENT OF INTRAUTERINE CONTRACEPTIVE DEVICE, INITIAL ENCOUNTER: ICD-10-CM

## 2020-06-20 DIAGNOSIS — N92.6 IRREGULAR BLEEDING: ICD-10-CM

## 2020-06-20 PROCEDURE — 58562 HYSTEROSCOPY REMOVE FB: CPT | Performed by: OBSTETRICS & GYNECOLOGY

## 2020-06-20 PROCEDURE — 0UJD8ZZ INSPECTION OF UTERUS AND CERVIX, VIA NATURAL OR ARTIFICIAL OPENING ENDOSCOPIC: ICD-10-PCS | Performed by: OBSTETRICS & GYNECOLOGY

## 2020-06-20 PROCEDURE — 0UB97ZX EXCISION OF UTERUS, VIA NATURAL OR ARTIFICIAL OPENING, DIAGNOSTIC: ICD-10-PCS | Performed by: OBSTETRICS & GYNECOLOGY

## 2020-06-20 PROCEDURE — 0UDB7ZX EXTRACTION OF ENDOMETRIUM, VIA NATURAL OR ARTIFICIAL OPENING, DIAGNOSTIC: ICD-10-PCS | Performed by: OBSTETRICS & GYNECOLOGY

## 2020-06-20 PROCEDURE — 0UC97ZZ EXTIRPATION OF MATTER FROM UTERUS, VIA NATURAL OR ARTIFICIAL OPENING: ICD-10-PCS | Performed by: OBSTETRICS & GYNECOLOGY

## 2020-06-20 RX ORDER — HYDROMORPHONE HYDROCHLORIDE 1 MG/ML
0.4 INJECTION, SOLUTION INTRAMUSCULAR; INTRAVENOUS; SUBCUTANEOUS EVERY 5 MIN PRN
Status: DISCONTINUED | OUTPATIENT
Start: 2020-06-20 | End: 2020-06-20

## 2020-06-20 RX ORDER — NALOXONE HYDROCHLORIDE 0.4 MG/ML
80 INJECTION, SOLUTION INTRAMUSCULAR; INTRAVENOUS; SUBCUTANEOUS AS NEEDED
Status: DISCONTINUED | OUTPATIENT
Start: 2020-06-20 | End: 2020-06-20

## 2020-06-20 RX ORDER — HYDROCODONE BITARTRATE AND ACETAMINOPHEN 5; 325 MG/1; MG/1
2 TABLET ORAL AS NEEDED
Status: DISCONTINUED | OUTPATIENT
Start: 2020-06-20 | End: 2020-06-20

## 2020-06-20 RX ORDER — ACETAMINOPHEN 500 MG
1000 TABLET ORAL ONCE
Status: DISCONTINUED | OUTPATIENT
Start: 2020-06-20 | End: 2020-06-20 | Stop reason: HOSPADM

## 2020-06-20 RX ORDER — ACETAMINOPHEN 500 MG
1000 TABLET ORAL ONCE
COMMUNITY
End: 2020-07-13 | Stop reason: ALTCHOICE

## 2020-06-20 RX ORDER — MIDAZOLAM HYDROCHLORIDE 1 MG/ML
INJECTION INTRAMUSCULAR; INTRAVENOUS AS NEEDED
Status: DISCONTINUED | OUTPATIENT
Start: 2020-06-20 | End: 2020-06-20 | Stop reason: SURG

## 2020-06-20 RX ORDER — ACETAMINOPHEN 500 MG
1000 TABLET ORAL ONCE AS NEEDED
Status: COMPLETED | OUTPATIENT
Start: 2020-06-20 | End: 2020-06-20

## 2020-06-20 RX ORDER — SODIUM CHLORIDE, SODIUM LACTATE, POTASSIUM CHLORIDE, CALCIUM CHLORIDE 600; 310; 30; 20 MG/100ML; MG/100ML; MG/100ML; MG/100ML
INJECTION, SOLUTION INTRAVENOUS CONTINUOUS
Status: DISCONTINUED | OUTPATIENT
Start: 2020-06-20 | End: 2020-06-20

## 2020-06-20 RX ORDER — ONDANSETRON 2 MG/ML
INJECTION INTRAMUSCULAR; INTRAVENOUS AS NEEDED
Status: DISCONTINUED | OUTPATIENT
Start: 2020-06-20 | End: 2020-06-20 | Stop reason: SURG

## 2020-06-20 RX ORDER — HYDROCODONE BITARTRATE AND ACETAMINOPHEN 5; 325 MG/1; MG/1
1 TABLET ORAL AS NEEDED
Status: DISCONTINUED | OUTPATIENT
Start: 2020-06-20 | End: 2020-06-20

## 2020-06-20 RX ADMIN — ONDANSETRON 4 MG: 2 INJECTION INTRAMUSCULAR; INTRAVENOUS at 09:51:00

## 2020-06-20 RX ADMIN — SODIUM CHLORIDE, SODIUM LACTATE, POTASSIUM CHLORIDE, CALCIUM CHLORIDE: 600; 310; 30; 20 INJECTION, SOLUTION INTRAVENOUS at 09:47:00

## 2020-06-20 RX ADMIN — MIDAZOLAM HYDROCHLORIDE 4 MG: 1 INJECTION INTRAMUSCULAR; INTRAVENOUS at 09:14:00

## 2020-06-20 NOTE — ANESTHESIA PREPROCEDURE EVALUATION
PRE-OP EVALUATION    Patient Name: Gaurav Ledezma    Pre-op Diagnosis: Displacement of intrauterine contraceptive device, initial encounter [T83.32XA]  Irregular bleeding [N92.6]    Procedure(s):   HYSTEROSCOPY, DILATION AND CURETTAGE, REMOVAL OF FORE INTERLAMINAR EPIDURAL INJECTION N/A 3/26/2018    Performed by Clinton Zayas MD at [facility] MAIN OR   • ORAL SURGERY PROCEDURE      wisdom tooth resection   • OTHER  4//2013    d&C   • RADIOFREQUENCY ABLATION OF THORACIC OR LUMBAR MEDIAL BRANCH Left 6/11/2018    P patient.     Comment: I explained the intrinsic risks of MAC anesthesia to Hazel Guillaume and her  including intraoperative awareness/recall, PONV, post-operative pain/discomfort, risk of aspiration, insertion of naso- or oropharyngeal airways,

## 2020-06-20 NOTE — H&P
History & Physical Examination    Patient Name: Tootie Banegas  MRN: [id number]  CSN: [id number]  YOB: 1980    Diagnosis: displaced IUD. Bleeding continously since placement of IUD mirena 6 mos ago.  Prior shana without bleeding    Prese Performed by Sandy Heart MD at [facility]   • LUMBAR INTERLAMINAR EPIDURAL INJECTION N/A 3/26/2018    Performed by Sandy Heart MD at [facility] MAIN OR   • ORAL SURGERY PROCEDURE      wisdom tooth resection   • OTHER  4//2013    d&C   • RADIOFREQUENCY ABLATION O

## 2020-06-20 NOTE — OPERATIVE REPORT
[facility]  Post-Operative Note    [address] [facility] Patient Status:  Hospital Outpatient Surgery    10/14/1980 MRN [id number]   [facility] SURGERY Attending Keith Snellen, MD   [facility] Day # 0 PCP Kahlil Wheat DO       Preoperative Morelai

## 2020-06-20 NOTE — ANESTHESIA POSTPROCEDURE EVALUATION
[address] Patient Status:  Hospital Outpatient Surgery   Age/Gender 44year old female MRN [id number]   [facility] SURGERY Attending Batool Zhou MD   [facility] Day # 0 PCP Nitza Cohn DO       Anesthesia Post-op Not

## 2020-06-22 ENCOUNTER — TELEPHONE (OUTPATIENT)
Dept: OBGYN CLINIC | Facility: CLINIC | Age: 40
End: 2020-06-22

## 2020-06-22 NOTE — TELEPHONE ENCOUNTER
PC with patient. She states she will return from vacation on 7/11/2020. Was told to follow up in 2 weeks. [id number] [address] for after she returns home. Asked if any bleeding? She states it has stopped. Feels ok.

## 2020-06-22 NOTE — TELEPHONE ENCOUNTER
Patient had surgery on Saturday June 20 and was tod to follow up in 2 weeks. However she is going on vacation so she wont be here then.

## 2020-06-26 ENCOUNTER — TELEPHONE (OUTPATIENT)
Dept: OBGYN CLINIC | Facility: CLINIC | Age: 40
End: 2020-06-26

## 2020-06-26 NOTE — TELEPHONE ENCOUNTER
Pt states she had a procedure last week with IUD removal and has begun bleeding today. Looking for guidance.

## 2020-06-26 NOTE — TELEPHONE ENCOUNTER
PC with patient. States just started bleeding like the beginning of when the menses starts. Placed a pad on. Had surgery last Saturday. Mild cramps. No clots. Concerned. Unsure if normal or if it could be a period?

## 2020-06-26 NOTE — TELEPHONE ENCOUNTER
PC with patient. Told per provider it may be her menses since IUD is out and not using anything to prevent shedding of lining. She asked if can use a tampon in stead of a pad now? Per Dr Pavithra Merino ok to use tampon.

## 2020-07-13 ENCOUNTER — OFFICE VISIT (OUTPATIENT)
Dept: OBGYN CLINIC | Facility: CLINIC | Age: 40
End: 2020-07-13

## 2020-07-13 VITALS
HEIGHT: 64.5 IN | SYSTOLIC BLOOD PRESSURE: 130 MMHG | TEMPERATURE: 99 F | BODY MASS INDEX: 35.41 KG/M2 | WEIGHT: 210 LBS | DIASTOLIC BLOOD PRESSURE: 80 MMHG | HEART RATE: 84 BPM | RESPIRATION RATE: 16 BRPM

## 2020-07-13 DIAGNOSIS — N92.1 MENORRHAGIA WITH IRREGULAR CYCLE: Primary | ICD-10-CM

## 2020-07-13 DIAGNOSIS — Z30.011 INITIATION OF OCP (BCP): ICD-10-CM

## 2020-07-13 PROCEDURE — 99213 OFFICE O/P EST LOW 20 MIN: CPT | Performed by: OBSTETRICS & GYNECOLOGY

## 2020-07-13 RX ORDER — ACETAMINOPHEN AND CODEINE PHOSPHATE 120; 12 MG/5ML; MG/5ML
0.35 SOLUTION ORAL DAILY
Qty: 3 PACKAGE | Refills: 0 | Status: ON HOLD | OUTPATIENT
Start: 2020-07-13 | End: 2020-07-30

## 2020-07-13 NOTE — PROGRESS NOTES
Post visit and discuss birth control. Menses started 6/26/2020 and bleed for 10 days. States was heavy for most of it.

## 2020-07-13 NOTE — PROGRESS NOTES
CHIEF COMPLAINT:   Patient presents with   Heavy menses   HPI:   Krystal Jensen is a 44year old [id number] Patient's last menstrual period was 06/26/2020. who presents with status post removal of IUD and polypectomy.   Her menses which started on June 2 (BCP)    Micronor 1 p.o. daily 3 months      Darlyn Camilo MD

## 2020-07-15 ENCOUNTER — TELEPHONE (OUTPATIENT)
Dept: OBGYN CLINIC | Facility: CLINIC | Age: 40
End: 2020-07-15

## 2020-07-15 DIAGNOSIS — N92.0 MENORRHAGIA WITH REGULAR CYCLE: Primary | ICD-10-CM

## 2020-07-15 NOTE — TELEPHONE ENCOUNTER
PT states she'd like to go ahead with ablation Dr. Sebastián Dennison mentioned. Has several questions regarding this surgery.   Please call

## 2020-07-15 NOTE — TELEPHONE ENCOUNTER
PC with patient. She wants to proceed with Endometrial Ablation. Prefers afternoon. She is teaching summer school in the morning. She asked about anesthesia? Told we use mac, like when she had UD removed.  She if it was ever done without anesthesia because

## 2020-07-15 NOTE — TELEPHONE ENCOUNTER
PC with patient. Aware surgery is scheduled for July 30,2020 at 1 pm at [facility] will contact her next. Understands.

## 2020-07-17 ENCOUNTER — TELEPHONE (OUTPATIENT)
Dept: FAMILY MEDICINE CLINIC | Facility: CLINIC | Age: 40
End: 2020-07-17

## 2020-07-17 NOTE — TELEPHONE ENCOUNTER
Spoke to pt she states she has been on Keto for several months and has been having stomach issues the last several days. .Pt states she has lots of stomach gurgling and gas and bloating. Pt wants to know if she should be seen. No other sx. Pt is having endometrial ablation on 7/30/2020. Please advise.

## 2020-07-17 NOTE — TELEPHONE ENCOUNTER
Has some questions about some stomach issue and her keto diet.   Pt asking for suggestions, but no appts avail today

## 2020-07-17 NOTE — TELEPHONE ENCOUNTER
Would be best to be seen.   In the meantime on keto she may be taking in too much fat and I recommend reducing that as well as any processed foods including processed meats, cheeses, etc.

## 2020-07-22 ENCOUNTER — TELEPHONE (OUTPATIENT)
Dept: OBGYN CLINIC | Facility: CLINIC | Age: 40
End: 2020-07-22

## 2020-07-22 ENCOUNTER — TELEPHONE (OUTPATIENT)
Dept: FAMILY MEDICINE CLINIC | Facility: CLINIC | Age: 40
End: 2020-07-22

## 2020-07-22 DIAGNOSIS — Z01.818 PRE-OP TESTING: Primary | ICD-10-CM

## 2020-07-22 NOTE — TELEPHONE ENCOUNTER
Patient is having a procedure at the end of the month at the hospital.  They told her no time has been scheduled, she thought it was at 1:00. Also how soon should she stop her permine (sp?).

## 2020-07-22 NOTE — TELEPHONE ENCOUNTER
PC with patient. Told her surgery time is around 1 pm but the pavan has not done there final times as yet. Surgery will contact her the night before to let her know. She is asking when she is suppose to stop the Phentermine before surgery?   With her las

## 2020-07-22 NOTE — TELEPHONE ENCOUNTER
Spoke with pt she states she will call surgeon tomorrow and call us back if pre-op physical required.

## 2020-07-22 NOTE — TELEPHONE ENCOUNTER
Received notice from pre-admission testing pt scheduled for Hysteroscopy D&C with endometrial ablation. They are requesting CBC and CMP. Would you like anything else ordered? Please advise. CBC and CMP ordered. Message left for pt informing her.

## 2020-07-23 NOTE — TELEPHONE ENCOUNTER
PC with patient. Aware to stop medication 3 days before surgery. She is asking if she needs a presurgical physical? Her pcp orderd her blood work and had asked her.

## 2020-07-24 ENCOUNTER — LABORATORY ENCOUNTER (OUTPATIENT)
Dept: LAB | Facility: HOSPITAL | Age: 40
End: 2020-07-24
Attending: FAMILY MEDICINE
Payer: COMMERCIAL

## 2020-07-24 DIAGNOSIS — Z01.818 PRE-OP TESTING: ICD-10-CM

## 2020-07-24 LAB
ALBUMIN SERPL-MCNC: 3.9 G/DL (ref 3.4–5)
ALBUMIN/GLOB SERPL: 1 {RATIO} (ref 1–2)
ALP LIVER SERPL-CCNC: 67 U/L (ref 37–98)
ALT SERPL-CCNC: 17 U/L (ref 13–56)
ANION GAP SERPL CALC-SCNC: 5 MMOL/L (ref 0–18)
AST SERPL-CCNC: 10 U/L (ref 15–37)
BASOPHILS # BLD AUTO: 0.05 X10(3) UL (ref 0–0.2)
BASOPHILS NFR BLD AUTO: 0.4 %
BILIRUB SERPL-MCNC: 0.4 MG/DL (ref 0.1–2)
BUN BLD-MCNC: 12 MG/DL (ref 7–18)
BUN/CREAT SERPL: 14 (ref 10–20)
CALCIUM BLD-MCNC: 9.6 MG/DL (ref 8.5–10.1)
CHLORIDE SERPL-SCNC: 107 MMOL/L (ref 98–112)
CO2 SERPL-SCNC: 25 MMOL/L (ref 21–32)
CREAT BLD-MCNC: 0.86 MG/DL (ref 0.55–1.02)
DEPRECATED RDW RBC AUTO: 42.6 FL (ref 35.1–46.3)
EOSINOPHIL # BLD AUTO: 0.27 X10(3) UL (ref 0–0.7)
EOSINOPHIL NFR BLD AUTO: 2 %
ERYTHROCYTE [DISTWIDTH] IN BLOOD BY AUTOMATED COUNT: 12.5 % (ref 11–15)
GLOBULIN PLAS-MCNC: 3.9 G/DL (ref 2.8–4.4)
GLUCOSE BLD-MCNC: 88 MG/DL (ref 70–99)
HCT VFR BLD AUTO: 42.7 % (ref 35–48)
HGB BLD-MCNC: 14.2 G/DL (ref 12–16)
IMM GRANULOCYTES # BLD AUTO: 0.06 X10(3) UL (ref 0–1)
IMM GRANULOCYTES NFR BLD: 0.4 %
LYMPHOCYTES # BLD AUTO: 2.43 X10(3) UL (ref 1–4)
LYMPHOCYTES NFR BLD AUTO: 17.7 %
M PROTEIN MFR SERPL ELPH: 7.8 G/DL (ref 6.4–8.2)
MCH RBC QN AUTO: 31 PG (ref 26–34)
MCHC RBC AUTO-ENTMCNC: 33.3 G/DL (ref 31–37)
MCV RBC AUTO: 93.2 FL (ref 80–100)
MONOCYTES # BLD AUTO: 0.57 X10(3) UL (ref 0.1–1)
MONOCYTES NFR BLD AUTO: 4.2 %
NEUTROPHILS # BLD AUTO: 10.34 X10 (3) UL (ref 1.5–7.7)
NEUTROPHILS # BLD AUTO: 10.34 X10(3) UL (ref 1.5–7.7)
NEUTROPHILS NFR BLD AUTO: 75.3 %
OSMOLALITY SERPL CALC.SUM OF ELEC: 283 MOSM/KG (ref 275–295)
PATIENT FASTING Y/N/NP: NO
PLATELET # BLD AUTO: 281 10(3)UL (ref 150–450)
POTASSIUM SERPL-SCNC: 4 MMOL/L (ref 3.5–5.1)
RBC # BLD AUTO: 4.58 X10(6)UL (ref 3.8–5.3)
SODIUM SERPL-SCNC: 137 MMOL/L (ref 136–145)
WBC # BLD AUTO: 13.7 X10(3) UL (ref 4–11)

## 2020-07-24 PROCEDURE — 36415 COLL VENOUS BLD VENIPUNCTURE: CPT

## 2020-07-24 PROCEDURE — 80053 COMPREHEN METABOLIC PANEL: CPT

## 2020-07-24 PROCEDURE — 85025 COMPLETE CBC W/AUTO DIFF WBC: CPT

## 2020-07-25 ENCOUNTER — MOBILE ENCOUNTER (OUTPATIENT)
Dept: FAMILY MEDICINE CLINIC | Facility: CLINIC | Age: 40
End: 2020-07-25

## 2020-07-25 RX ORDER — CIPROFLOXACIN 500 MG/1
500 TABLET, FILM COATED ORAL 2 TIMES DAILY
Qty: 8 TABLET | Refills: 0 | Status: SHIPPED | OUTPATIENT
Start: 2020-07-25 | End: 2020-08-26 | Stop reason: ALTCHOICE

## 2020-07-27 ENCOUNTER — LAB ENCOUNTER (OUTPATIENT)
Dept: LAB | Facility: HOSPITAL | Age: 40
End: 2020-07-27
Attending: OBSTETRICS & GYNECOLOGY
Payer: COMMERCIAL

## 2020-07-27 ENCOUNTER — TELEPHONE (OUTPATIENT)
Dept: OBGYN CLINIC | Facility: CLINIC | Age: 40
End: 2020-07-27

## 2020-07-27 ENCOUNTER — APPOINTMENT (OUTPATIENT)
Dept: LAB | Age: 40
End: 2020-07-27
Attending: FAMILY MEDICINE
Payer: COMMERCIAL

## 2020-07-27 DIAGNOSIS — N39.0 URINARY TRACT INFECTION WITHOUT HEMATURIA, SITE UNSPECIFIED: ICD-10-CM

## 2020-07-27 DIAGNOSIS — N92.1 MENORRHAGIA WITH IRREGULAR CYCLE: ICD-10-CM

## 2020-07-27 LAB
BILIRUB UR QL STRIP.AUTO: NEGATIVE
CLARITY UR REFRACT.AUTO: CLEAR
COLOR UR AUTO: YELLOW
GLUCOSE UR STRIP.AUTO-MCNC: NEGATIVE MG/DL
LEUKOCYTE ESTERASE UR QL STRIP.AUTO: NEGATIVE
NITRITE UR QL STRIP.AUTO: NEGATIVE
PH UR STRIP.AUTO: 5 [PH] (ref 4.5–8)
PROT UR STRIP.AUTO-MCNC: NEGATIVE MG/DL
RBC UR QL AUTO: NEGATIVE
SP GR UR STRIP.AUTO: 1.02 (ref 1–1.03)
UROBILINOGEN UR STRIP.AUTO-MCNC: <2 MG/DL

## 2020-07-27 PROCEDURE — 87086 URINE CULTURE/COLONY COUNT: CPT

## 2020-07-27 PROCEDURE — 81003 URINALYSIS AUTO W/O SCOPE: CPT

## 2020-07-27 NOTE — TELEPHONE ENCOUNTER
PC with patient. On blood work for pre testing, wbc was elevated. She had done some test strips from the pharmacy for possible uti. She said unsure on readings being positive. She started Cipro on Saturday for 4 days. Tuesday will be day 4. She has given a urine for culture today. Asking if her surgery will be pushed back  If she had been on the antibiotic? Spoke with Dr Norma Bernal who said it is no problem, even if she had a uti, the surgery would proceed. Understands.

## 2020-07-27 NOTE — TELEPHONE ENCOUNTER
Patient called very anxious requesting to speak to RN regarding questions she has about her upcoming procedure on 07/30. Patient did not wish to disclose any other information. Please call back to discuss.

## 2020-07-28 LAB — SARS-COV-2 RNA RESP QL NAA+PROBE: NOT DETECTED

## 2020-07-29 ENCOUNTER — ANESTHESIA EVENT (OUTPATIENT)
Dept: SURGERY | Facility: HOSPITAL | Age: 40
End: 2020-07-29
Payer: COMMERCIAL

## 2020-07-30 ENCOUNTER — ANESTHESIA (OUTPATIENT)
Dept: SURGERY | Facility: HOSPITAL | Age: 40
End: 2020-07-30
Payer: COMMERCIAL

## 2020-07-30 ENCOUNTER — HOSPITAL ENCOUNTER (OUTPATIENT)
Facility: HOSPITAL | Age: 40
Setting detail: HOSPITAL OUTPATIENT SURGERY
Discharge: HOME | End: 2020-07-30
Attending: OBSTETRICS & GYNECOLOGY | Admitting: OBSTETRICS & GYNECOLOGY
Payer: COMMERCIAL

## 2020-07-30 VITALS
WEIGHT: 208.31 LBS | BODY MASS INDEX: 34.71 KG/M2 | RESPIRATION RATE: 18 BRPM | HEART RATE: 65 BPM | TEMPERATURE: 97 F | DIASTOLIC BLOOD PRESSURE: 79 MMHG | HEIGHT: 65 IN | SYSTOLIC BLOOD PRESSURE: 130 MMHG | OXYGEN SATURATION: 99 %

## 2020-07-30 DIAGNOSIS — N92.0 MENORRHAGIA WITH REGULAR CYCLE: ICD-10-CM

## 2020-07-30 DIAGNOSIS — N92.1 MENORRHAGIA WITH IRREGULAR CYCLE: Primary | ICD-10-CM

## 2020-07-30 LAB
POCT LOT NUMBER: NORMAL
POCT URINE PREGNANCY: NEGATIVE

## 2020-07-30 PROCEDURE — 0U5B8ZZ DESTRUCTION OF ENDOMETRIUM, VIA NATURAL OR ARTIFICIAL OPENING ENDOSCOPIC: ICD-10-PCS | Performed by: OBSTETRICS & GYNECOLOGY

## 2020-07-30 PROCEDURE — 58563 HYSTEROSCOPY ABLATION: CPT | Performed by: OBSTETRICS & GYNECOLOGY

## 2020-07-30 PROCEDURE — 0UDB7ZZ EXTRACTION OF ENDOMETRIUM, VIA NATURAL OR ARTIFICIAL OPENING: ICD-10-PCS | Performed by: OBSTETRICS & GYNECOLOGY

## 2020-07-30 RX ORDER — SODIUM CHLORIDE, SODIUM LACTATE, POTASSIUM CHLORIDE, CALCIUM CHLORIDE 600; 310; 30; 20 MG/100ML; MG/100ML; MG/100ML; MG/100ML
INJECTION, SOLUTION INTRAVENOUS CONTINUOUS
Status: DISCONTINUED | OUTPATIENT
Start: 2020-07-30 | End: 2020-07-30

## 2020-07-30 RX ORDER — METOCLOPRAMIDE HYDROCHLORIDE 5 MG/ML
10 INJECTION INTRAMUSCULAR; INTRAVENOUS AS NEEDED
Status: DISCONTINUED | OUTPATIENT
Start: 2020-07-30 | End: 2020-07-30

## 2020-07-30 RX ORDER — HYDROCODONE BITARTRATE AND ACETAMINOPHEN 5; 325 MG/1; MG/1
2 TABLET ORAL AS NEEDED
Status: COMPLETED | OUTPATIENT
Start: 2020-07-30 | End: 2020-07-30

## 2020-07-30 RX ORDER — NALOXONE HYDROCHLORIDE 0.4 MG/ML
80 INJECTION, SOLUTION INTRAMUSCULAR; INTRAVENOUS; SUBCUTANEOUS AS NEEDED
Status: DISCONTINUED | OUTPATIENT
Start: 2020-07-30 | End: 2020-07-30

## 2020-07-30 RX ORDER — KETOROLAC TROMETHAMINE 30 MG/ML
INJECTION, SOLUTION INTRAMUSCULAR; INTRAVENOUS AS NEEDED
Status: DISCONTINUED | OUTPATIENT
Start: 2020-07-30 | End: 2020-07-30 | Stop reason: SURG

## 2020-07-30 RX ORDER — MIDAZOLAM HYDROCHLORIDE 1 MG/ML
INJECTION INTRAMUSCULAR; INTRAVENOUS AS NEEDED
Status: DISCONTINUED | OUTPATIENT
Start: 2020-07-30 | End: 2020-07-30 | Stop reason: SURG

## 2020-07-30 RX ORDER — KETAMINE HYDROCHLORIDE 50 MG/ML
INJECTION, SOLUTION, CONCENTRATE INTRAMUSCULAR; INTRAVENOUS AS NEEDED
Status: DISCONTINUED | OUTPATIENT
Start: 2020-07-30 | End: 2020-07-30 | Stop reason: SURG

## 2020-07-30 RX ORDER — ONDANSETRON 2 MG/ML
4 INJECTION INTRAMUSCULAR; INTRAVENOUS AS NEEDED
Status: DISCONTINUED | OUTPATIENT
Start: 2020-07-30 | End: 2020-07-30

## 2020-07-30 RX ORDER — HYDROCODONE BITARTRATE AND ACETAMINOPHEN 5; 325 MG/1; MG/1
1 TABLET ORAL AS NEEDED
Status: COMPLETED | OUTPATIENT
Start: 2020-07-30 | End: 2020-07-30

## 2020-07-30 RX ORDER — ACETAMINOPHEN 500 MG
500 TABLET ORAL EVERY 6 HOURS PRN
COMMUNITY
End: 2020-10-25

## 2020-07-30 RX ORDER — ONDANSETRON 2 MG/ML
INJECTION INTRAMUSCULAR; INTRAVENOUS AS NEEDED
Status: DISCONTINUED | OUTPATIENT
Start: 2020-07-30 | End: 2020-07-30 | Stop reason: SURG

## 2020-07-30 RX ORDER — DEXAMETHASONE SODIUM PHOSPHATE 4 MG/ML
VIAL (ML) INJECTION AS NEEDED
Status: DISCONTINUED | OUTPATIENT
Start: 2020-07-30 | End: 2020-07-30 | Stop reason: SURG

## 2020-07-30 RX ORDER — ACETAMINOPHEN 500 MG
1000 TABLET ORAL ONCE
Status: DISCONTINUED | OUTPATIENT
Start: 2020-07-30 | End: 2020-07-30

## 2020-07-30 RX ORDER — HYDROMORPHONE HYDROCHLORIDE 1 MG/ML
0.4 INJECTION, SOLUTION INTRAMUSCULAR; INTRAVENOUS; SUBCUTANEOUS EVERY 5 MIN PRN
Status: DISCONTINUED | OUTPATIENT
Start: 2020-07-30 | End: 2020-07-30

## 2020-07-30 RX ADMIN — SODIUM CHLORIDE, SODIUM LACTATE, POTASSIUM CHLORIDE, CALCIUM CHLORIDE: 600; 310; 30; 20 INJECTION, SOLUTION INTRAVENOUS at 13:31:00

## 2020-07-30 RX ADMIN — MIDAZOLAM HYDROCHLORIDE 2 MG: 1 INJECTION INTRAMUSCULAR; INTRAVENOUS at 12:46:00

## 2020-07-30 RX ADMIN — ONDANSETRON 4 MG: 2 INJECTION INTRAMUSCULAR; INTRAVENOUS at 13:23:00

## 2020-07-30 RX ADMIN — DEXAMETHASONE SODIUM PHOSPHATE 4 MG: 4 MG/ML VIAL (ML) INJECTION at 12:55:00

## 2020-07-30 RX ADMIN — KETAMINE HYDROCHLORIDE 25 MG: 50 INJECTION, SOLUTION, CONCENTRATE INTRAMUSCULAR; INTRAVENOUS at 12:54:00

## 2020-07-30 RX ADMIN — KETOROLAC TROMETHAMINE 30 MG: 30 INJECTION, SOLUTION INTRAMUSCULAR; INTRAVENOUS at 13:23:00

## 2020-07-30 NOTE — ANESTHESIA PREPROCEDURE EVALUATION
PRE-OP EVALUATION    Patient Name: Krystal Jensen    Pre-op Diagnosis: Menorrhagia with regular cycle [N92.0]    Procedure(s):   HYSTEROSCOPY DILATION AND CURETTAGE, ENDOMETRIAL ABLATION WITH DUC      Surgeon(s) and Role:     * Ector Lujan MD - P Performed by Sid Lees MD at [facility] MAIN OR   • ORAL SURGERY PROCEDURE      wisdom tooth resection   • OTHER  4//2013    d&C   • RADIOFREQUENCY ABLATION OF THORACIC OR LUMBAR MEDIAL BRANCH Left 6/11/2018    Performed by Sid Lees MD at [facility] MAIN OR   • RAD with ETT, nausea, vomiting, injury to lips, gums, tongue, teeth, eyes, awareness under anesthesia, cardiac, pulmonary, aspiration, stroke events, delayed emergence, allergic reaction, and death.  Pt understands and consents to receiving anesthesia    Plan/r

## 2020-07-30 NOTE — ANESTHESIA POSTPROCEDURE EVALUATION
[address] Patient Status:  Hospital Outpatient Surgery   Age/Gender 44year old female MRN [id number]   Location [address] [place] [address] Attending Keith Snellen, [address] [facility] [address] Day # 0 PCP Matthew Conner DO       Anesthes

## 2020-07-30 NOTE — H&P
History & Physical Examination    Patient Name: Tootie Banegas  MRN: [id number]  CSN: [id number]  YOB: 1980    Diagnosis: heavy menses    Present Illness: Patient has been experiencing heavy menses thought to be due to IUD which was rem MD at [facility] MAIN OR   • LUMBAR FACET INJECTION BILATERAL Bilateral 4/11/2018    Performed by Breanne Ellis MD at [facility]   • LUMBAR INTERLAMINAR EPIDURAL INJECTION N/A 5/22/2019    Performed by Breanne Ellis MD at [facility] ENDOSCOPY   • LUMBAR INTERLAMINAR EPIDURA History and Physical done within the last 30 days. Any changes noted above.     Med Hendricks  7/30/2020  12:30 PM

## 2020-07-30 NOTE — OPERATIVE REPORT
[facility]  Post-Operative Note    [address] [facility] Patient Status:  Hospital Outpatient Surgery    10/14/1980 MRN [id number]   Location [address] [place] [address] Attending Arline Diaz, Sow[facility] Day # 0 PCP DO Veronika Polk about 1 cm from the fundus and the array was deployed. The Ami device was then advanced toward the uterine fundus again, the endocervical balloon was inflated.  The cavity assessment was performed and the device was activated for a total treatment lengt

## 2020-08-26 ENCOUNTER — OFFICE VISIT (OUTPATIENT)
Dept: FAMILY MEDICINE CLINIC | Facility: CLINIC | Age: 40
End: 2020-08-26

## 2020-08-26 VITALS
BODY MASS INDEX: 34.32 KG/M2 | HEART RATE: 88 BPM | HEIGHT: 65 IN | OXYGEN SATURATION: 98 % | WEIGHT: 206 LBS | TEMPERATURE: 98 F | SYSTOLIC BLOOD PRESSURE: 128 MMHG | DIASTOLIC BLOOD PRESSURE: 84 MMHG | RESPIRATION RATE: 16 BRPM

## 2020-08-26 DIAGNOSIS — E66.09 CLASS 1 OBESITY DUE TO EXCESS CALORIES WITHOUT SERIOUS COMORBIDITY WITH BODY MASS INDEX (BMI) OF 34.0 TO 34.9 IN ADULT: Primary | ICD-10-CM

## 2020-08-26 DIAGNOSIS — G47.9 DIFFICULTY SLEEPING: ICD-10-CM

## 2020-08-26 PROBLEM — E66.811 CLASS 1 OBESITY WITHOUT SERIOUS COMORBIDITY WITH BODY MASS INDEX (BMI) OF 34.0 TO 34.9 IN ADULT: Status: ACTIVE | Noted: 2020-05-08

## 2020-08-26 PROBLEM — E66.9 CLASS 1 OBESITY WITHOUT SERIOUS COMORBIDITY WITH BODY MASS INDEX (BMI) OF 34.0 TO 34.9 IN ADULT: Status: ACTIVE | Noted: 2020-05-08

## 2020-08-26 PROCEDURE — 99214 OFFICE O/P EST MOD 30 MIN: CPT | Performed by: PHYSICIAN ASSISTANT

## 2020-08-26 PROCEDURE — 3079F DIAST BP 80-89 MM HG: CPT | Performed by: PHYSICIAN ASSISTANT

## 2020-08-26 PROCEDURE — 3074F SYST BP LT 130 MM HG: CPT | Performed by: PHYSICIAN ASSISTANT

## 2020-08-26 PROCEDURE — 3008F BODY MASS INDEX DOCD: CPT | Performed by: PHYSICIAN ASSISTANT

## 2020-08-26 RX ORDER — HYDROXYZINE 50 MG/1
50 TABLET, FILM COATED ORAL NIGHTLY
Qty: 30 TABLET | Refills: 2 | Status: SHIPPED | OUTPATIENT
Start: 2020-08-26 | End: 2020-10-31

## 2020-08-26 RX ORDER — PHENTERMINE HYDROCHLORIDE 37.5 MG/1
37.5 CAPSULE ORAL EVERY MORNING
Qty: 30 CAPSULE | Refills: 2 | Status: SHIPPED | OUTPATIENT
Start: 2020-08-26 | End: 2021-01-30

## 2020-08-26 NOTE — PROGRESS NOTES
HPI:    Patient ID: Zacarias Crow is a 44year old female. HPI   Patient presents today for follow-up on weight loss. She is currently on phentermine and tolerating well. No adverse side effects reported.   She has lost a total of 50 pounds sinc every 6 (six) hours as needed for Pain.      • gabapentin 300 MG Oral Cap Start at 300mg for first week, then increase to 600mg 60 capsule 6     Allergies:  Sulfa Antibiotics       UNKNOWN    Comment:Hives  Penicillins             HIVES   PHYSICAL EXAM:   P tablet 2     Sig: Take 1 tablet (50 mg total) by mouth nightly.        Imaging & Referrals:  None         YAEL#[id number]

## 2020-09-16 ENCOUNTER — TELEMEDICINE (OUTPATIENT)
Dept: FAMILY MEDICINE CLINIC | Facility: CLINIC | Age: 40
End: 2020-09-16

## 2020-09-16 DIAGNOSIS — M51.36 DDD (DEGENERATIVE DISC DISEASE), LUMBAR: Primary | ICD-10-CM

## 2020-09-16 PROCEDURE — 99213 OFFICE O/P EST LOW 20 MIN: CPT | Performed by: PHYSICIAN ASSISTANT

## 2020-09-16 RX ORDER — CYCLOBENZAPRINE HCL 5 MG
5 TABLET ORAL 3 TIMES DAILY PRN
Qty: 15 TABLET | Refills: 0 | Status: SHIPPED | OUTPATIENT
Start: 2020-09-16 | End: 2020-10-31

## 2020-09-16 RX ORDER — NAPROXEN 500 MG/1
500 TABLET ORAL 2 TIMES DAILY WITH MEALS
Qty: 30 TABLET | Refills: 0 | Status: SHIPPED | OUTPATIENT
Start: 2020-09-16 | End: 2021-01-06

## 2020-09-16 NOTE — PROGRESS NOTES
Video Visit    Frances Ortiz is a 44year old female. No chief complaint on file. HPI:   Patient presents today complaining of back pain for the last several days.   States while driving home 1 day she had to break quickly to avoid a car accide classified    • Dysmenorrhea    • Dysphonia    • Major depressive disorder, recurrent episode, mild (HCC)    • Pain in joint, pelvic region and thigh    • Palpitations    • Post partum depression     hx w/1st pregnancy   • Rhinitis, allergic    • RLS (rest PSYCH: Normal mood and affect, A&Ox3.       ASSESSMENT AND PLAN:   (M51.36) DDD (degenerative disc disease), lumbar  (primary encounter diagnosis)  Plan: MRI SPINE LUMBAR (CPT=72148), CHIROPRACTIC  -         INTERNAL        Recent flareup of back pain due telehealth consent form, related consents and the risks discussed. Lastly, the patient confirmed that [place] in [place]. Included in this visit, time may have been spent reviewing labs, medications, radiology tests and decision making.  Appropriate daily with meals. • cyclobenzaprine 5 MG Oral Tab 15 tablet 0     Sig: Take 1 tablet (5 mg total) by mouth 3 (three) times daily as needed.        Imaging & Referrals:  CHIROPRACTIC  - INTERNAL  MRI SPINE LUMBAR (CPT=72148)       [id number]

## 2020-09-23 ENCOUNTER — HOSPITAL ENCOUNTER (OUTPATIENT)
Dept: MRI IMAGING | Age: 40
Discharge: HOME | End: 2020-09-23
Attending: PHYSICIAN ASSISTANT
Payer: COMMERCIAL

## 2020-09-23 DIAGNOSIS — M51.36 DDD (DEGENERATIVE DISC DISEASE), LUMBAR: ICD-10-CM

## 2020-09-23 PROCEDURE — 72148 MRI LUMBAR SPINE W/O DYE: CPT | Performed by: PHYSICIAN ASSISTANT

## 2020-10-26 ENCOUNTER — TELEPHONE (OUTPATIENT)
Dept: OBGYN CLINIC | Facility: CLINIC | Age: 40
End: 2020-10-26

## 2020-10-26 NOTE — TELEPHONE ENCOUNTER
PC with patient. Aware Dr Sebastián Dennison would like her to make an appointment. Scheduled for 10/31/2020. Can use motrin if bleeding is heavy. Tampons are ok. States changing the tampon every 3 to 4 hours.

## 2020-10-26 NOTE — TELEPHONE ENCOUNTER
PC with patient. Had ablation end of July. In September some watery blood tinged discharge. This past Saturday gushing of blood, bright red with blood clots. Changed pad about every 3 hours. Lasted all day. Mild cramps. Sunday and today, no blood on pad.  Onl

## 2020-10-26 NOTE — TELEPHONE ENCOUNTER
----- Message from [address] [facility]. Brisa sent at 10/24/2020 12:54 PM CDT -----  Regarding: Visit Follow-up Question  Contact: [phone]  Hi, again! I had an ablation 7/30. Had some watered down blood last month and just started gushing blood today.  There

## 2020-10-31 ENCOUNTER — OFFICE VISIT (OUTPATIENT)
Dept: OBGYN CLINIC | Facility: CLINIC | Age: 40
End: 2020-10-31

## 2020-10-31 VITALS
SYSTOLIC BLOOD PRESSURE: 110 MMHG | WEIGHT: 200 LBS | HEIGHT: 65 IN | RESPIRATION RATE: 16 BRPM | BODY MASS INDEX: 33.32 KG/M2 | DIASTOLIC BLOOD PRESSURE: 70 MMHG | TEMPERATURE: 97 F | HEART RATE: 72 BPM

## 2020-10-31 DIAGNOSIS — Z23 NEED FOR VACCINATION: ICD-10-CM

## 2020-10-31 DIAGNOSIS — Z12.31 ENCOUNTER FOR SCREENING MAMMOGRAM FOR MALIGNANT NEOPLASM OF BREAST: ICD-10-CM

## 2020-10-31 DIAGNOSIS — N92.0 MENORRHAGIA WITH REGULAR CYCLE: Primary | ICD-10-CM

## 2020-10-31 PROCEDURE — 3078F DIAST BP <80 MM HG: CPT | Performed by: OBSTETRICS & GYNECOLOGY

## 2020-10-31 PROCEDURE — 90471 IMMUNIZATION ADMIN: CPT | Performed by: OBSTETRICS & GYNECOLOGY

## 2020-10-31 PROCEDURE — 90686 IIV4 VACC NO PRSV 0.5 ML IM: CPT | Performed by: OBSTETRICS & GYNECOLOGY

## 2020-10-31 PROCEDURE — 99213 OFFICE O/P EST LOW 20 MIN: CPT | Performed by: OBSTETRICS & GYNECOLOGY

## 2020-10-31 PROCEDURE — 3074F SYST BP LT 130 MM HG: CPT | Performed by: OBSTETRICS & GYNECOLOGY

## 2020-10-31 PROCEDURE — 3008F BODY MASS INDEX DOCD: CPT | Performed by: OBSTETRICS & GYNECOLOGY

## 2020-10-31 NOTE — PROGRESS NOTES
CHIEF COMPLAINT:   Patient presents with   Heavy menses   HPI:   Isabel Bland is a 36year old [id number] Patient's last menstrual period was 06/26/2020. who presents with status post ablation in July 30 of 2020.   After the procedure she had 2 weeks o distribution, no lesions or ulcerations  vagina     no abnormal discharge, no lesions or ulcerations  cervix      closed, no CMT, no lesions or polyps  uterus      WNL size, NT, mobile,   adnexa     no masses, NT  IMPRESSION/PLAN:     1.  Need for vaccinati

## 2020-11-02 ENCOUNTER — PATIENT MESSAGE (OUTPATIENT)
Dept: FAMILY MEDICINE CLINIC | Facility: CLINIC | Age: 40
End: 2020-11-02

## 2020-11-02 DIAGNOSIS — L82.1 SEBORRHEIC KERATOSES: Primary | ICD-10-CM

## 2020-11-03 NOTE — TELEPHONE ENCOUNTER
From: Anastasiia Ledezma  To: Denys Fair PA-C  Sent: 11/2/2020 9:38 PM CST  Subject: Referral Request    Hi, there. So, I recently visited Dr. Maryuri Farris for my skin conditions and he wanted to see me back in 6 months.  Does my current referral cover me un

## 2020-11-18 ENCOUNTER — TELEPHONE (OUTPATIENT)
Dept: FAMILY MEDICINE CLINIC | Facility: CLINIC | Age: 40
End: 2020-11-18

## 2020-11-18 DIAGNOSIS — M51.36 DDD (DEGENERATIVE DISC DISEASE), LUMBAR: Primary | ICD-10-CM

## 2020-11-19 NOTE — TELEPHONE ENCOUNTER
Referral entered for Chiro- notes faxed to Kamaljit rocha at [facility].

## 2020-11-23 ENCOUNTER — HOSPITAL ENCOUNTER (OUTPATIENT)
Dept: MAMMOGRAPHY | Age: 40
Discharge: HOME | End: 2020-11-23
Attending: OBSTETRICS & GYNECOLOGY
Payer: COMMERCIAL

## 2020-11-23 DIAGNOSIS — Z12.31 ENCOUNTER FOR SCREENING MAMMOGRAM FOR MALIGNANT NEOPLASM OF BREAST: ICD-10-CM

## 2020-11-23 PROCEDURE — 77063 BREAST TOMOSYNTHESIS BI: CPT | Performed by: OBSTETRICS & GYNECOLOGY

## 2020-11-23 PROCEDURE — 77067 SCR MAMMO BI INCL CAD: CPT | Performed by: OBSTETRICS & GYNECOLOGY

## 2020-11-25 ENCOUNTER — HOSPITAL ENCOUNTER (OUTPATIENT)
Dept: MAMMOGRAPHY | Facility: HOSPITAL | Age: 40
Discharge: HOME | End: 2020-11-25
Attending: OBSTETRICS & GYNECOLOGY
Payer: COMMERCIAL

## 2020-11-25 DIAGNOSIS — R92.2 INCONCLUSIVE MAMMOGRAM: ICD-10-CM

## 2020-11-25 PROCEDURE — 77066 DX MAMMO INCL CAD BI: CPT | Performed by: OBSTETRICS & GYNECOLOGY

## 2020-11-25 PROCEDURE — 76642 ULTRASOUND BREAST LIMITED: CPT | Performed by: OBSTETRICS & GYNECOLOGY

## 2020-11-25 PROCEDURE — 77062 BREAST TOMOSYNTHESIS BI: CPT | Performed by: OBSTETRICS & GYNECOLOGY

## 2020-11-27 DIAGNOSIS — R92.2 INCONCLUSIVE MAMMOGRAPHY: Primary | ICD-10-CM

## 2020-11-27 NOTE — PROGRESS NOTES
Additional views completed, recommendations for Breast MRI with and without contrast. Order placed and routed to Dr. Lulu Boyer, referral placed.

## 2020-11-28 ENCOUNTER — PATIENT MESSAGE (OUTPATIENT)
Dept: FAMILY MEDICINE CLINIC | Facility: CLINIC | Age: 40
End: 2020-11-28

## 2020-11-30 ENCOUNTER — TELEPHONE (OUTPATIENT)
Dept: OBGYN CLINIC | Facility: CLINIC | Age: 40
End: 2020-11-30

## 2020-11-30 RX ORDER — ALPRAZOLAM 0.5 MG/1
1 TABLET ORAL NIGHTLY PRN
Qty: 60 TABLET | Refills: 0 | Status: SHIPPED | OUTPATIENT
Start: 2020-11-30 | End: 2021-01-06

## 2020-11-30 NOTE — TELEPHONE ENCOUNTER
From: Yunior Ledezma  To: Hailey Chaparro PA-C  Sent: 11/28/2020 1:30 AM CST  Subject: Prescription Question    Hi, there. May I please have a refill on the xanax?  I have been pretty steady at 1mg for a bit and am trying to only take it when I absolut

## 2020-11-30 NOTE — TELEPHONE ENCOUNTER
LOV (televisit) 9/16/20  Last Rx 10/25/20 #60 + 0   Please send refill if agreeable. Pended.  Thank you, Michela BUCHANAN

## 2020-12-11 ENCOUNTER — HOSPITAL ENCOUNTER (OUTPATIENT)
Dept: MRI IMAGING | Facility: HOSPITAL | Age: 40
Discharge: HOME | End: 2020-12-11
Attending: OBSTETRICS & GYNECOLOGY
Payer: COMMERCIAL

## 2020-12-11 DIAGNOSIS — R92.2 INCONCLUSIVE MAMMOGRAPHY: ICD-10-CM

## 2020-12-11 PROCEDURE — 77049 MRI BREAST C-+ W/CAD BI: CPT | Performed by: OBSTETRICS & GYNECOLOGY

## 2020-12-11 PROCEDURE — A9575 INJ GADOTERATE MEGLUMI 0.1ML: HCPCS

## 2020-12-14 NOTE — PROGRESS NOTES
Eduardo Castanon,  Your breast MRI results are normal. I encourage you to learn more about each test by accessing the great resources available through [employer].  Click on the test name if you would like to see a description of the test. I hope this information

## 2021-01-30 ENCOUNTER — OFFICE VISIT (OUTPATIENT)
Dept: OBGYN CLINIC | Facility: CLINIC | Age: 41
End: 2021-01-30

## 2021-01-30 VITALS
SYSTOLIC BLOOD PRESSURE: 112 MMHG | WEIGHT: 200 LBS | HEIGHT: 65 IN | HEART RATE: 72 BPM | TEMPERATURE: 99 F | BODY MASS INDEX: 33.32 KG/M2 | DIASTOLIC BLOOD PRESSURE: 72 MMHG | RESPIRATION RATE: 14 BRPM

## 2021-01-30 DIAGNOSIS — Z12.4 SCREENING FOR MALIGNANT NEOPLASM OF CERVIX: ICD-10-CM

## 2021-01-30 DIAGNOSIS — Z11.51 SCREENING FOR HUMAN PAPILLOMAVIRUS: ICD-10-CM

## 2021-01-30 DIAGNOSIS — Z01.419 ENCOUNTER FOR ANNUAL ROUTINE GYNECOLOGICAL EXAMINATION: Primary | ICD-10-CM

## 2021-01-30 DIAGNOSIS — Z12.31 BREAST CANCER SCREENING BY MAMMOGRAM: ICD-10-CM

## 2021-01-30 PROCEDURE — 88175 CYTOPATH C/V AUTO FLUID REDO: CPT | Performed by: OBSTETRICS & GYNECOLOGY

## 2021-01-30 PROCEDURE — 3074F SYST BP LT 130 MM HG: CPT | Performed by: OBSTETRICS & GYNECOLOGY

## 2021-01-30 PROCEDURE — 99396 PREV VISIT EST AGE 40-64: CPT | Performed by: OBSTETRICS & GYNECOLOGY

## 2021-01-30 PROCEDURE — 87624 HPV HI-RISK TYP POOLED RSLT: CPT | Performed by: OBSTETRICS & GYNECOLOGY

## 2021-01-30 PROCEDURE — 3078F DIAST BP <80 MM HG: CPT | Performed by: OBSTETRICS & GYNECOLOGY

## 2021-01-30 PROCEDURE — 3008F BODY MASS INDEX DOCD: CPT | Performed by: OBSTETRICS & GYNECOLOGY

## 2021-01-30 NOTE — PROGRESS NOTES
Pt here for pe/pap.   LMP:1/4/21 approximately  Last pap:8/9/18 negative   Last mammogram;11/23/20 with subsequent imaging following  Birth control: Not currently sexually active

## 2021-01-30 NOTE — PROGRESS NOTES
HPI:   Korin Katz is a 36year old [id number] who presents for an annual gynecological exam.   Menses: Patient's last menstrual period was 01/04/2020. Cycle length: 24-26 days Flow:  light   had ablation in July 2020.   Since that time menses have be RADIOFREQUENCY ABLATION OF THORACIC OR LUMBAR MEDIAL BRANCH Left 6/11/2018    Performed by Nancy Zuniga MD at [facility] MAIN OR   • [address] [address] Right 6/4/2018    Performed by Nancy Zuniga MD at [facility] MAIN OR   • [address] nourished, well developed  SKIN: Normal, no rashes, no acne, no hirsutism, no ulcers  HEENT: Atraumatic, normocephalic, throat is clear  NECK: No masses, symmetric  THYROID: No masses, no thyromegaly  BREASTS: Normal inspection, no dominant masses on palpa answered; no barriers to learning. ASSESSMENT AND PLAN:   Percy Escalante is a 36year old female who presents for an annual gynecological exam.      Normal exam.  Pap done. Mammogram ordered. Start screening colonoscopy at age 48.   Contracep

## 2021-02-02 LAB — HPV I/H RISK 1 DNA SPEC QL NAA+PROBE: NEGATIVE

## 2021-02-12 ENCOUNTER — TELEPHONE (OUTPATIENT)
Dept: FAMILY MEDICINE CLINIC | Facility: CLINIC | Age: 41
End: 2021-02-12

## 2021-02-12 NOTE — TELEPHONE ENCOUNTER
2/10/21, med scan sheet made. Did you see Dr. Brittney Benton recommendations? Does pt need referral to neurosurgery?

## 2021-02-12 NOTE — TELEPHONE ENCOUNTER
WAS SEEING DR Payton Anne AND HE SAID HE WAS SENDING NOTES TO US REGARDING HER SEEING A NEURO SURGEON.     PT WOULD LIKE A CALL BACK TO KNOW WHOM TO GO TO.

## 2021-02-15 ENCOUNTER — OFFICE VISIT (OUTPATIENT)
Dept: FAMILY MEDICINE CLINIC | Facility: CLINIC | Age: 41
End: 2021-02-15

## 2021-02-15 VITALS
HEIGHT: 65 IN | OXYGEN SATURATION: 98 % | RESPIRATION RATE: 18 BRPM | HEART RATE: 78 BPM | BODY MASS INDEX: 33 KG/M2 | TEMPERATURE: 98 F | SYSTOLIC BLOOD PRESSURE: 128 MMHG | DIASTOLIC BLOOD PRESSURE: 80 MMHG

## 2021-02-15 DIAGNOSIS — G89.29 CHRONIC RADICULAR LUMBAR PAIN: Primary | ICD-10-CM

## 2021-02-15 DIAGNOSIS — M54.16 CHRONIC RADICULAR LUMBAR PAIN: Primary | ICD-10-CM

## 2021-02-15 DIAGNOSIS — M51.26 BULGING LUMBAR DISC: ICD-10-CM

## 2021-02-15 PROCEDURE — 3074F SYST BP LT 130 MM HG: CPT | Performed by: FAMILY MEDICINE

## 2021-02-15 PROCEDURE — 3079F DIAST BP 80-89 MM HG: CPT | Performed by: FAMILY MEDICINE

## 2021-02-15 PROCEDURE — 99214 OFFICE O/P EST MOD 30 MIN: CPT | Performed by: FAMILY MEDICINE

## 2021-02-15 RX ORDER — HYDROCODONE BITARTRATE AND ACETAMINOPHEN 5; 325 MG/1; MG/1
1 TABLET ORAL EVERY 6 HOURS PRN
Qty: 30 TABLET | Refills: 0 | Status: SHIPPED | OUTPATIENT
Start: 2021-02-15 | End: 2021-10-05

## 2021-02-15 NOTE — PROGRESS NOTES
HPI:   Frances Ortiz is a 36year old female who presents for chronic lumbar pain     Pain started 10 years ago   Now constant pain with intermittent exacerbations   Radiation into both legs to knees  + numbness and weakness     Pt s/p years of PT, at L4-L5 is effacing bilateral lateral recesses and is unchanged since prior examination. There is no significant spinal canal or neural foraminal stenosis.         Dictated by (CST): Mali Stewart MD on 9/24/2020 at 9:54 AM       Finalized by (CST): OF THORACIC OR LUMBAR MEDIAL BRANCH Left 6/11/2018    Performed by Velia Mckeon MD at [facility] MAIN OR   • [address] Right 6/4/2018    Performed by Velia Mckeon MD at [facility] MAIN OR   • TRANSFORAMINAL LUMBAR EPIDURAL distress  MUSCULOSKELETAL: + lumbar pain   LE: 5+ strength 1+ DTR's normal sensation bilaterally   EXTREMITIES: no cyanosis, clubbing or edema  NEURO: cranial nerves are intact,motor and sensory are grossly intact  PSYCH: normal mood and affect, good eye c

## 2021-03-10 ENCOUNTER — OFFICE VISIT (OUTPATIENT)
Dept: SURGERY | Facility: CLINIC | Age: 41
End: 2021-03-10

## 2021-03-10 VITALS
SYSTOLIC BLOOD PRESSURE: 102 MMHG | HEIGHT: 65 IN | HEART RATE: 76 BPM | BODY MASS INDEX: 33.32 KG/M2 | DIASTOLIC BLOOD PRESSURE: 74 MMHG | WEIGHT: 200 LBS

## 2021-03-10 DIAGNOSIS — M54.41 CHRONIC BILATERAL LOW BACK PAIN WITH BILATERAL SCIATICA: Primary | ICD-10-CM

## 2021-03-10 DIAGNOSIS — M54.42 CHRONIC BILATERAL LOW BACK PAIN WITH BILATERAL SCIATICA: Primary | ICD-10-CM

## 2021-03-10 DIAGNOSIS — G89.29 CHRONIC BILATERAL LOW BACK PAIN WITH BILATERAL SCIATICA: Primary | ICD-10-CM

## 2021-03-10 PROCEDURE — 99214 OFFICE O/P EST MOD 30 MIN: CPT | Performed by: PHYSICIAN ASSISTANT

## 2021-03-10 PROCEDURE — 3008F BODY MASS INDEX DOCD: CPT | Performed by: PHYSICIAN ASSISTANT

## 2021-03-10 PROCEDURE — 3074F SYST BP LT 130 MM HG: CPT | Performed by: PHYSICIAN ASSISTANT

## 2021-03-10 PROCEDURE — 3078F DIAST BP <80 MM HG: CPT | Performed by: PHYSICIAN ASSISTANT

## 2021-03-10 NOTE — H&P
Neurosurgery Clinic Visit  3/10/2021    [address] [place] [address] PCP:  DO ARACELI Melara 10/14/1980 MRN [id number]       CC:  Back Pain    HPI:    Wayne Urbano is a very pleasant 36year old female who presents with progressive chronic low back pain over the la Years of education: Not on file      Highest education level: Not on file    Tobacco Use      Smoking status: Never Smoker      Smokeless tobacco: Never Used    Vaping Use      Vaping Use: Never used    Substance and Sexual Activity      Alcohol use:  Yes Alert and Oriented x 3, CN 2-12 GI, Speech Fluent, Negative Romberg, Negative Pronator Drift, Finger-to-Nose Intact, SILT, Gait Normal  Mild-moderate pain with right hip internal and external rotation  Palpation Right  Left Midline   Cervical Spinous   N BLE.  Will evaluate for deformity, instability. Discussed possible diagnostic injections with lumbar fusion as a last resort.      Radha Awan MD  Neurological Surgeon  [address]  [facility], [address], [address] [facility]

## 2021-03-10 NOTE — PROGRESS NOTES
Ongoing x 10 years  No accident or injury  Pain goes down backs of legs to knees, spreads out to hips  Mostly on the Right side, but there is pain on the Left  Is constant pain,   Pain 7/10 daily, will occasionally flare, and can be lower at times as well

## 2021-03-17 ENCOUNTER — TELEPHONE (OUTPATIENT)
Dept: FAMILY MEDICINE CLINIC | Facility: CLINIC | Age: 41
End: 2021-03-17

## 2021-03-17 NOTE — TELEPHONE ENCOUNTER
Faxed received from Dr. Vee Mccormick:   Referral request for extension of visits with OV notes from her chiropractic appts.  Note requesting more referral visits for supportive treatment every 3 weeks until surgery, will be released after this subsequent referral.   [id number] Jemma Vo for extension on chrio referral?

## 2021-03-17 NOTE — TELEPHONE ENCOUNTER
Neurosurgery, please see requested extension for chiropractic manipulation, does Dr. Gomez Given advise ok to continue to surgery date (to be determined)?

## 2021-03-23 ENCOUNTER — TELEPHONE (OUTPATIENT)
Dept: FAMILY MEDICINE CLINIC | Facility: CLINIC | Age: 41
End: 2021-03-23

## 2021-03-23 DIAGNOSIS — M51.36 DDD (DEGENERATIVE DISC DISEASE), LUMBAR: Primary | ICD-10-CM

## 2021-03-23 NOTE — TELEPHONE ENCOUNTER
Fax received from Dr. Deonna Gimenez requesting referral for [facility]. Referral entered - notes from Dr. Deonna Gimenez faxed to [phone].   Referral pending

## 2021-05-05 ENCOUNTER — EKG ENCOUNTER (OUTPATIENT)
Dept: LAB | Age: 41
End: 2021-05-05
Attending: PHYSICIAN ASSISTANT
Payer: COMMERCIAL

## 2021-05-05 ENCOUNTER — TELEMEDICINE (OUTPATIENT)
Dept: FAMILY MEDICINE CLINIC | Facility: CLINIC | Age: 41
End: 2021-05-05

## 2021-05-05 ENCOUNTER — LAB ENCOUNTER (OUTPATIENT)
Dept: LAB | Age: 41
End: 2021-05-05
Attending: PHYSICIAN ASSISTANT
Payer: COMMERCIAL

## 2021-05-05 DIAGNOSIS — R55 POSTURAL DIZZINESS WITH PRESYNCOPE: ICD-10-CM

## 2021-05-05 DIAGNOSIS — R00.2 PALPITATIONS: ICD-10-CM

## 2021-05-05 DIAGNOSIS — R42 POSTURAL DIZZINESS WITH PRESYNCOPE: ICD-10-CM

## 2021-05-05 DIAGNOSIS — R00.2 PALPITATIONS: Primary | ICD-10-CM

## 2021-05-05 PROCEDURE — 93010 ELECTROCARDIOGRAM REPORT: CPT | Performed by: INTERNAL MEDICINE

## 2021-05-05 PROCEDURE — 93005 ELECTROCARDIOGRAM TRACING: CPT

## 2021-05-05 PROCEDURE — 85025 COMPLETE CBC W/AUTO DIFF WBC: CPT

## 2021-05-05 PROCEDURE — 84439 ASSAY OF FREE THYROXINE: CPT

## 2021-05-05 PROCEDURE — 84443 ASSAY THYROID STIM HORMONE: CPT

## 2021-05-05 PROCEDURE — 80053 COMPREHEN METABOLIC PANEL: CPT

## 2021-05-05 PROCEDURE — 99214 OFFICE O/P EST MOD 30 MIN: CPT | Performed by: PHYSICIAN ASSISTANT

## 2021-05-05 PROCEDURE — 83735 ASSAY OF MAGNESIUM: CPT

## 2021-05-05 PROCEDURE — 36415 COLL VENOUS BLD VENIPUNCTURE: CPT

## 2021-05-05 NOTE — PROGRESS NOTES
Video Visit    Juvenal Muniz is a 36year old female. No chief complaint on file. HPI:   Patient presents today with concerns of presyncope and palpitations.   She recently started trying to increase her exercise intensity and has started runni (restless legs syndrome)    • Screening, lipid 11/10/2011   • Sinusitis    • Sjogren's disease ([facility] [address]    • Sleep apnea     cpap   • Somnambulance     daytime   • Urinary incontinence       Past Surgical History:   Procedure Laterality Date   • D & c     • O lifestyle modifications. Differential includes orthostatic hypotension, vasovagal response, arrhythmia, anemia, thyroid dysfunction. Will check labs and baseline EKG to evaluate further.   If testing is negative then we can proceed with echocardiogram and decision making. Appropriate medical decision-making and tests are ordered as detailed in the plan of care above. Coding/billing information is submitted for this visit based on complexity of care and/or time spent for the visit.

## 2021-05-06 ENCOUNTER — PATIENT MESSAGE (OUTPATIENT)
Dept: FAMILY MEDICINE CLINIC | Facility: CLINIC | Age: 41
End: 2021-05-06

## 2021-05-06 DIAGNOSIS — R07.9 EXERTIONAL CHEST PAIN: Primary | ICD-10-CM

## 2021-05-06 NOTE — TELEPHONE ENCOUNTER
From: Severiano Slaughter Pretkelis  To: Timmy Frazier PA-C  Sent: 5/6/2021 3:21 PM CDT  Subject: Non-Urgent Medical Question    One more thing. ...could I have a referral to see a therapist/counselor at [place] [id number]? 2 of my children see people there

## 2021-05-06 NOTE — TELEPHONE ENCOUNTER
Please see patient response regarding exertional chest pain, ok to refer to Dr. Betty Vital at this point?

## 2021-05-06 NOTE — TELEPHONE ENCOUNTER
Hi, there. I had high levels of wbc last time I had blood drawn as well. Is it a side effect of the medications I'm taking?   Other than the near fainting spells, I've had the tightness in the chest and heart palpatations at times, but not necessarily c

## 2021-05-06 NOTE — TELEPHONE ENCOUNTER
From: Jared Ledezma  To: Jerod Carrillo PA-C  Sent: 5/6/2021 8:55 AM CDT  Subject: Visit Follow-up Question    I do have GI issues but I feel like that's all the time because of being on keto. ..and it's not anything that's intolerable because I woul

## 2021-06-24 ENCOUNTER — OFFICE VISIT (OUTPATIENT)
Dept: FAMILY MEDICINE CLINIC | Facility: CLINIC | Age: 41
End: 2021-06-24

## 2021-06-24 ENCOUNTER — HOSPITAL ENCOUNTER (OUTPATIENT)
Dept: GENERAL RADIOLOGY | Age: 41
Discharge: HOME | End: 2021-06-24
Attending: PHYSICIAN ASSISTANT
Payer: COMMERCIAL

## 2021-06-24 VITALS
OXYGEN SATURATION: 98 % | HEART RATE: 86 BPM | RESPIRATION RATE: 16 BRPM | DIASTOLIC BLOOD PRESSURE: 70 MMHG | BODY MASS INDEX: 34.26 KG/M2 | SYSTOLIC BLOOD PRESSURE: 130 MMHG | HEIGHT: 65 IN | WEIGHT: 205.63 LBS

## 2021-06-24 DIAGNOSIS — M25.552 LEFT HIP PAIN: ICD-10-CM

## 2021-06-24 DIAGNOSIS — E66.09 CLASS 1 OBESITY DUE TO EXCESS CALORIES WITHOUT SERIOUS COMORBIDITY WITH BODY MASS INDEX (BMI) OF 34.0 TO 34.9 IN ADULT: Primary | ICD-10-CM

## 2021-06-24 PROCEDURE — 3008F BODY MASS INDEX DOCD: CPT | Performed by: PHYSICIAN ASSISTANT

## 2021-06-24 PROCEDURE — 73502 X-RAY EXAM HIP UNI 2-3 VIEWS: CPT | Performed by: PHYSICIAN ASSISTANT

## 2021-06-24 PROCEDURE — 3075F SYST BP GE 130 - 139MM HG: CPT | Performed by: PHYSICIAN ASSISTANT

## 2021-06-24 PROCEDURE — 99214 OFFICE O/P EST MOD 30 MIN: CPT | Performed by: PHYSICIAN ASSISTANT

## 2021-06-24 PROCEDURE — 3078F DIAST BP <80 MM HG: CPT | Performed by: PHYSICIAN ASSISTANT

## 2021-06-24 RX ORDER — TOPIRAMATE 25 MG/1
25 TABLET ORAL DAILY
Qty: 30 TABLET | Refills: 2 | Status: ON HOLD | OUTPATIENT
Start: 2021-06-24 | End: 2021-08-07

## 2021-06-24 RX ORDER — PHENTERMINE HYDROCHLORIDE 37.5 MG/1
37.5 TABLET ORAL
Qty: 30 TABLET | Refills: 2 | Status: SHIPPED | OUTPATIENT
Start: 2021-06-24 | End: 2021-07-30

## 2021-06-24 NOTE — PROGRESS NOTES
HPI/Subjective:   Patient ID: Alesha Bartlett is a 36year old female. HPI   Patient presents today to follow-up in a couple of issues. She is struggling to lose weight.   She is exercising most days of the week and has been eating the same foods light-headedness, numbness and headaches. Psychiatric/Behavioral: Positive for sleep disturbance.      Current Outpatient Medications   Medication Sig Dispense Refill   • Phentermine HCl 37.5 MG Oral Tab Take 1 tablet (37.5 mg total) by mouth every mornin adult  Restart phentermine and add a trial of topiramate 25 mg daily. Side effects discussed. Drink plenty of water. As far as diet is concerned she can continue to maintain low-carb diet but should also reduce your intake of fats.   She should monitor h

## 2021-07-23 ENCOUNTER — TELEPHONE (OUTPATIENT)
Dept: FAMILY MEDICINE CLINIC | Facility: CLINIC | Age: 41
End: 2021-07-23

## 2021-07-30 ENCOUNTER — OFFICE VISIT (OUTPATIENT)
Dept: FAMILY MEDICINE CLINIC | Facility: CLINIC | Age: 41
End: 2021-07-30

## 2021-07-30 VITALS
RESPIRATION RATE: 16 BRPM | DIASTOLIC BLOOD PRESSURE: 74 MMHG | WEIGHT: 184 LBS | BODY MASS INDEX: 30.66 KG/M2 | SYSTOLIC BLOOD PRESSURE: 122 MMHG | HEIGHT: 65 IN | OXYGEN SATURATION: 98 % | HEART RATE: 86 BPM

## 2021-07-30 DIAGNOSIS — L73.9 FOLLICULITIS: Primary | ICD-10-CM

## 2021-07-30 DIAGNOSIS — L30.4 INTERTRIGO: ICD-10-CM

## 2021-07-30 PROCEDURE — 3008F BODY MASS INDEX DOCD: CPT | Performed by: PHYSICIAN ASSISTANT

## 2021-07-30 PROCEDURE — 3078F DIAST BP <80 MM HG: CPT | Performed by: PHYSICIAN ASSISTANT

## 2021-07-30 PROCEDURE — 99213 OFFICE O/P EST LOW 20 MIN: CPT | Performed by: PHYSICIAN ASSISTANT

## 2021-07-30 PROCEDURE — 3074F SYST BP LT 130 MM HG: CPT | Performed by: PHYSICIAN ASSISTANT

## 2021-07-30 RX ORDER — FLUCONAZOLE 150 MG/1
TABLET ORAL
Qty: 2 TABLET | Refills: 0 | Status: SHIPPED | OUTPATIENT
Start: 2021-07-30 | End: 2021-08-05

## 2021-07-30 RX ORDER — CLINDAMYCIN PHOSPHATE 10 MG/G
1 GEL TOPICAL 2 TIMES DAILY
Qty: 30 G | Refills: 2 | Status: SHIPPED | OUTPATIENT
Start: 2021-07-30 | End: 2021-08-05

## 2021-07-30 RX ORDER — CLOTRIMAZOLE AND BETAMETHASONE DIPROPIONATE 10; .64 MG/G; MG/G
1 CREAM TOPICAL 2 TIMES DAILY PRN
Qty: 30 G | Refills: 2 | Status: SHIPPED | OUTPATIENT
Start: 2021-07-30 | End: 2021-08-05

## 2021-07-30 RX ORDER — PHENTERMINE HYDROCHLORIDE 37.5 MG/1
37.5 TABLET ORAL
Qty: 30 TABLET | Refills: 2 | Status: ON HOLD | OUTPATIENT
Start: 2021-07-30 | End: 2021-08-07

## 2021-07-30 NOTE — PROGRESS NOTES
HPI/Subjective:   Patient ID: Naina Ewing is a 36year old female. HPI   Patient presents with a rash for the last few days. It is located in the groin region as well as under the breasts.   She also has 1 painful lesion in the posterior right th Appearance: Normal appearance. Skin:     Findings: Rash (papulopustular rash of the mons pubis, inguinal folds, and under the breasts. Single maculopapular lesion of the right posterior thigh that is erythematous.) present.    Neurological:      Mental S

## 2021-08-01 ENCOUNTER — OFFICE VISIT (OUTPATIENT)
Dept: FAMILY MEDICINE CLINIC | Facility: CLINIC | Age: 41
End: 2021-08-01

## 2021-08-01 VITALS
WEIGHT: 186 LBS | DIASTOLIC BLOOD PRESSURE: 72 MMHG | BODY MASS INDEX: 30.99 KG/M2 | HEART RATE: 103 BPM | TEMPERATURE: 98 F | SYSTOLIC BLOOD PRESSURE: 116 MMHG | RESPIRATION RATE: 20 BRPM | OXYGEN SATURATION: 99 % | HEIGHT: 65 IN

## 2021-08-01 DIAGNOSIS — J02.9 PHARYNGITIS, UNSPECIFIED ETIOLOGY: Primary | ICD-10-CM

## 2021-08-01 LAB
CONTROL LINE PRESENT WITH A CLEAR BACKGROUND (YES/NO): YES YES/NO
KIT EXPIRATION DATE: NORMAL DATE
KIT LOT #: NORMAL NUMERIC
STREP GRP A CUL-SCR: NEGATIVE

## 2021-08-01 PROCEDURE — 3008F BODY MASS INDEX DOCD: CPT | Performed by: NURSE PRACTITIONER

## 2021-08-01 PROCEDURE — 3078F DIAST BP <80 MM HG: CPT | Performed by: NURSE PRACTITIONER

## 2021-08-01 PROCEDURE — 99213 OFFICE O/P EST LOW 20 MIN: CPT | Performed by: NURSE PRACTITIONER

## 2021-08-01 PROCEDURE — 3074F SYST BP LT 130 MM HG: CPT | Performed by: NURSE PRACTITIONER

## 2021-08-01 PROCEDURE — 87880 STREP A ASSAY W/OPTIC: CPT | Performed by: NURSE PRACTITIONER

## 2021-08-01 NOTE — PROGRESS NOTES
CHIEF COMPLAINT:   Patient presents with:  Sore Throat: post nasal drip, white spot x2days,        HPI:   Verlena Reilly is a 36year old female presents to clinic with complaint of sore throat. Patient has had for 2 days.  Patient reports following a • RLS (restless legs syndrome)    • Screening, lipid 11/10/2011   • Sinusitis    • Sjogren's disease ([facility] [address]    • Sleep apnea     cpap   • Somnambulance     daytime   • Urinary incontinence       Social History:  Social History    Tobacco Use      Smoking Yes/No    Kit Lot # [id number] Numeric    Kit Expiration Date 0,158,707 Date         ASSESSMENT AND PLAN:   Assessment:  Alfredo Sher was seen today for sore throat.     Diagnoses and all orders for this visit:    Pharyngitis, unspecified etiology  -     STREP A ASS

## 2021-08-02 ENCOUNTER — OFFICE VISIT (OUTPATIENT)
Dept: OBGYN CLINIC | Facility: CLINIC | Age: 41
End: 2021-08-02

## 2021-08-02 VITALS
HEIGHT: 65 IN | BODY MASS INDEX: 30.99 KG/M2 | HEART RATE: 72 BPM | RESPIRATION RATE: 14 BRPM | WEIGHT: 186 LBS | SYSTOLIC BLOOD PRESSURE: 120 MMHG | TEMPERATURE: 100 F | DIASTOLIC BLOOD PRESSURE: 68 MMHG

## 2021-08-02 DIAGNOSIS — R30.0 DYSURIA: ICD-10-CM

## 2021-08-02 DIAGNOSIS — Z11.3 SCREENING FOR STD (SEXUALLY TRANSMITTED DISEASE): Primary | ICD-10-CM

## 2021-08-02 DIAGNOSIS — N89.8 VAGINAL ITCHING: ICD-10-CM

## 2021-08-02 DIAGNOSIS — N89.8 VAGINAL IRRITATION: ICD-10-CM

## 2021-08-02 DIAGNOSIS — N89.8 VAGINAL DISCHARGE: ICD-10-CM

## 2021-08-02 PROCEDURE — 87591 N.GONORRHOEAE DNA AMP PROB: CPT | Performed by: OBSTETRICS & GYNECOLOGY

## 2021-08-02 PROCEDURE — 87480 CANDIDA DNA DIR PROBE: CPT | Performed by: OBSTETRICS & GYNECOLOGY

## 2021-08-02 PROCEDURE — 3008F BODY MASS INDEX DOCD: CPT | Performed by: OBSTETRICS & GYNECOLOGY

## 2021-08-02 PROCEDURE — 87660 TRICHOMONAS VAGIN DIR PROBE: CPT | Performed by: OBSTETRICS & GYNECOLOGY

## 2021-08-02 PROCEDURE — 99214 OFFICE O/P EST MOD 30 MIN: CPT | Performed by: OBSTETRICS & GYNECOLOGY

## 2021-08-02 PROCEDURE — 87086 URINE CULTURE/COLONY COUNT: CPT | Performed by: OBSTETRICS & GYNECOLOGY

## 2021-08-02 PROCEDURE — 81002 URINALYSIS NONAUTO W/O SCOPE: CPT | Performed by: OBSTETRICS & GYNECOLOGY

## 2021-08-02 PROCEDURE — 3074F SYST BP LT 130 MM HG: CPT | Performed by: OBSTETRICS & GYNECOLOGY

## 2021-08-02 PROCEDURE — 87491 CHLMYD TRACH DNA AMP PROBE: CPT | Performed by: OBSTETRICS & GYNECOLOGY

## 2021-08-02 PROCEDURE — 3078F DIAST BP <80 MM HG: CPT | Performed by: OBSTETRICS & GYNECOLOGY

## 2021-08-02 PROCEDURE — 87510 GARDNER VAG DNA DIR PROBE: CPT | Performed by: OBSTETRICS & GYNECOLOGY

## 2021-08-02 NOTE — PROGRESS NOTES
Pt had a brief sexual encounter and is here for STD testing. She is being treated for a yeast infection. She states she is having some burning upon urination.

## 2021-08-02 NOTE — PROGRESS NOTES
Naina Ewing is a 36year old female. HPI:   Patient presents with: Other: std screening  Dysuria  Patient is tearful and upset. Had unprotected intercourse outside of marriage. Had a rash in the groin area last week but has since resolved.   Seema Reinoso lesions, good perineal support           Vagina: normal mucosa, no lesions,  white discharge                     Cervix: normal os, no lesions or bleeding                    R/V: normal perineum, no hemorrhoids  EXTREMITIES: nontender without edema      AS

## 2021-08-03 LAB
C TRACH DNA SPEC QL NAA+PROBE: NEGATIVE
N GONORRHOEA DNA SPEC QL NAA+PROBE: NEGATIVE

## 2021-08-04 ENCOUNTER — PATIENT MESSAGE (OUTPATIENT)
Dept: OBGYN CLINIC | Facility: CLINIC | Age: 41
End: 2021-08-04

## 2021-08-04 LAB
GLUCOSE (URINE DIPSTICK): NEGATIVE MG/DL
KETONES (URINE DIPSTICK): 40 MG/DL
LEUKOCYTES: NEGATIVE
MULTISTIX LOT#: 5077 NUMERIC
NITRITE, URINE: NEGATIVE
OCCULT BLOOD: NEGATIVE
PH, URINE: 7.5 (ref 4.5–8)
SPECIFIC GRAVITY: 1.02 (ref 1–1.03)
UROBILINOGEN,SEMI-QN: 2 MG/DL (ref 0–1.9)

## 2021-08-04 RX ORDER — METRONIDAZOLE 500 MG/1
500 TABLET ORAL 2 TIMES DAILY
Qty: 14 TABLET | Refills: 0 | Status: SHIPPED | OUTPATIENT
Start: 2021-08-04 | End: 2021-08-11

## 2021-08-04 NOTE — TELEPHONE ENCOUNTER
From: Sathish Ledezma  To: Makenna Mccormick MD  Sent: 8/4/2021 2:50 PM CDT  Subject: Test Results Question    Checking in to see what you think about the test results.  I'm pretty sure I was vaccinated for Hepatitis but I honestly can't remember because it wa

## 2021-08-04 NOTE — TELEPHONE ENCOUNTER
Spoke with patient via phone call regarding test results. Please see result notes. Advised that orders were placed for her by Dr. Carmen Alexandra back in January for blood work.  Advised she call central scheduling to schedule them and that she needs to fast 12 hrs pr

## 2021-08-04 NOTE — PROGRESS NOTES
Patient aware of vaginal swab results and recommendation to start Flagyl. Patient verbalizes understanding.

## 2021-08-05 ENCOUNTER — HOSPITAL ENCOUNTER (OUTPATIENT)
Facility: HOSPITAL | Age: 41
Setting detail: OBSERVATION
Discharge: HOME | End: 2021-08-07
Attending: EMERGENCY MEDICINE
Payer: COMMERCIAL

## 2021-08-05 ENCOUNTER — TELEPHONE (OUTPATIENT)
Dept: OBGYN CLINIC | Facility: CLINIC | Age: 41
End: 2021-08-05

## 2021-08-05 ENCOUNTER — LAB ENCOUNTER (OUTPATIENT)
Dept: LAB | Age: 41
End: 2021-08-05
Attending: OBSTETRICS & GYNECOLOGY
Payer: COMMERCIAL

## 2021-08-05 DIAGNOSIS — Z01.419 ENCOUNTER FOR ANNUAL ROUTINE GYNECOLOGICAL EXAMINATION: ICD-10-CM

## 2021-08-05 DIAGNOSIS — D70.9 NEUTROPENIA, UNSPECIFIED TYPE (HCC): Primary | ICD-10-CM

## 2021-08-05 DIAGNOSIS — R63.4 WEIGHT LOSS: ICD-10-CM

## 2021-08-05 LAB
ALBUMIN SERPL-MCNC: 3.6 G/DL (ref 3.4–5)
ALBUMIN SERPL-MCNC: 3.6 G/DL (ref 3.4–5)
ALBUMIN/GLOB SERPL: 0.9 {RATIO} (ref 1–2)
ALBUMIN/GLOB SERPL: 1 {RATIO} (ref 1–2)
ALP LIVER SERPL-CCNC: 61 U/L
ALP LIVER SERPL-CCNC: 66 U/L
ALT SERPL-CCNC: 21 U/L
ALT SERPL-CCNC: 24 U/L
ANION GAP SERPL CALC-SCNC: 3 MMOL/L (ref 0–18)
ANION GAP SERPL CALC-SCNC: 6 MMOL/L (ref 0–18)
APTT PPP: 33.9 SECONDS (ref 25.4–36.1)
AST SERPL-CCNC: 17 U/L (ref 15–37)
AST SERPL-CCNC: 20 U/L (ref 15–37)
B-HCG UR QL: NEGATIVE
BASOPHILS # BLD AUTO: 0.04 X10(3) UL (ref 0–0.2)
BASOPHILS # BLD AUTO: 0.04 X10(3) UL (ref 0–0.2)
BASOPHILS NFR BLD AUTO: 2 %
BASOPHILS NFR BLD AUTO: 2 %
BILIRUB SERPL-MCNC: 1 MG/DL (ref 0.1–2)
BILIRUB SERPL-MCNC: 1.1 MG/DL (ref 0.1–2)
BILIRUB UR QL STRIP.AUTO: NEGATIVE
BUN BLD-MCNC: 10 MG/DL (ref 7–18)
BUN BLD-MCNC: 9 MG/DL (ref 7–18)
CALCIUM BLD-MCNC: 9 MG/DL (ref 8.5–10.1)
CALCIUM BLD-MCNC: 9 MG/DL (ref 8.5–10.1)
CHLORIDE SERPL-SCNC: 108 MMOL/L (ref 98–112)
CHLORIDE SERPL-SCNC: 108 MMOL/L (ref 98–112)
CHOLEST SMN-MCNC: 179 MG/DL (ref ?–200)
CK SERPL-CCNC: 50 U/L
CO2 SERPL-SCNC: 24 MMOL/L (ref 21–32)
CO2 SERPL-SCNC: 26 MMOL/L (ref 21–32)
COLOR UR AUTO: YELLOW
CREAT BLD-MCNC: 0.79 MG/DL
CREAT BLD-MCNC: 0.81 MG/DL
CRP SERPL-MCNC: 7.17 MG/DL (ref ?–0.3)
EOSINOPHIL # BLD AUTO: 0.04 X10(3) UL (ref 0–0.7)
EOSINOPHIL # BLD AUTO: 0.08 X10(3) UL (ref 0–0.7)
EOSINOPHIL NFR BLD AUTO: 2 %
EOSINOPHIL NFR BLD AUTO: 3.9 %
ERYTHROCYTE [DISTWIDTH] IN BLOOD BY AUTOMATED COUNT: 13.7 %
ERYTHROCYTE [DISTWIDTH] IN BLOOD BY AUTOMATED COUNT: 14.2 %
FOLATE SERPL-MCNC: 11.2 NG/ML (ref 8.7–?)
GLOBULIN PLAS-MCNC: 3.7 G/DL (ref 2.8–4.4)
GLOBULIN PLAS-MCNC: 4 G/DL (ref 2.8–4.4)
GLUCOSE BLD-MCNC: 89 MG/DL (ref 70–99)
GLUCOSE BLD-MCNC: 95 MG/DL (ref 70–99)
GLUCOSE UR STRIP.AUTO-MCNC: NEGATIVE MG/DL
HCT VFR BLD AUTO: 35.5 %
HCT VFR BLD AUTO: 36.9 %
HDLC SERPL-MCNC: 37 MG/DL (ref 40–59)
HGB BLD-MCNC: 11.9 G/DL
HGB BLD-MCNC: 12.3 G/DL
HGB RETIC QN AUTO: 35.6 PG (ref 28.2–36.6)
IMM GRANULOCYTES # BLD AUTO: 0.02 X10(3) UL (ref 0–1)
IMM GRANULOCYTES # BLD AUTO: 0.03 X10(3) UL (ref 0–1)
IMM GRANULOCYTES NFR BLD: 1 %
IMM GRANULOCYTES NFR BLD: 1.5 %
IMM RETICS NFR: 0.1 RATIO (ref 0.1–0.3)
INR BLD: 1.12 (ref 0.89–1.11)
KETONES UR STRIP.AUTO-MCNC: NEGATIVE MG/DL
LDH SERPL L TO P-CCNC: 281 U/L
LDLC SERPL CALC-MCNC: 120 MG/DL (ref ?–100)
LYMPHOCYTES # BLD AUTO: 0.85 X10(3) UL (ref 1–4)
LYMPHOCYTES # BLD AUTO: 0.92 X10(3) UL (ref 1–4)
LYMPHOCYTES NFR BLD AUTO: 41.7 %
LYMPHOCYTES NFR BLD AUTO: 46.2 %
M PROTEIN MFR SERPL ELPH: 7.3 G/DL (ref 6.4–8.2)
M PROTEIN MFR SERPL ELPH: 7.6 G/DL (ref 6.4–8.2)
MCH RBC QN AUTO: 31.4 PG (ref 26–34)
MCH RBC QN AUTO: 32.4 PG (ref 26–34)
MCHC RBC AUTO-ENTMCNC: 32.2 G/DL (ref 31–37)
MCHC RBC AUTO-ENTMCNC: 34.6 G/DL (ref 31–37)
MCV RBC AUTO: 93.4 FL
MCV RBC AUTO: 97.4 FL
MONOCYTES # BLD AUTO: 0.67 X10(3) UL (ref 0.1–1)
MONOCYTES # BLD AUTO: 0.69 X10(3) UL (ref 0.1–1)
MONOCYTES NFR BLD AUTO: 33.7 %
MONOCYTES NFR BLD AUTO: 33.8 %
MONOSCREEN: NEGATIVE
NEUTROPHILS # BLD AUTO: 0.29 X10 (3) UL (ref 1.5–7.7)
NEUTROPHILS # BLD AUTO: 0.29 X10(3) UL (ref 1.5–7.7)
NEUTROPHILS # BLD AUTO: 0.36 X10 (3) UL (ref 1.5–7.7)
NEUTROPHILS # BLD AUTO: 0.36 X10(3) UL (ref 1.5–7.7)
NEUTROPHILS NFR BLD AUTO: 14.6 %
NEUTROPHILS NFR BLD AUTO: 17.6 %
NITRITE UR QL STRIP.AUTO: NEGATIVE
NONHDLC SERPL-MCNC: 142 MG/DL (ref ?–130)
OSMOLALITY SERPL CALC.SUM OF ELEC: 282 MOSM/KG (ref 275–295)
OSMOLALITY SERPL CALC.SUM OF ELEC: 285 MOSM/KG (ref 275–295)
PATIENT FASTING Y/N/NP: YES
PATIENT FASTING Y/N/NP: YES
PH UR STRIP.AUTO: 5 [PH] (ref 5–8)
PLATELET # BLD AUTO: 181 10(3)UL (ref 150–450)
PLATELET # BLD AUTO: 187 10(3)UL (ref 150–450)
PLATELET MORPHOLOGY: NORMAL
POTASSIUM SERPL-SCNC: 3.9 MMOL/L (ref 3.5–5.1)
POTASSIUM SERPL-SCNC: 4.3 MMOL/L (ref 3.5–5.1)
PROT UR STRIP.AUTO-MCNC: NEGATIVE MG/DL
PSA SERPL DL<=0.01 NG/ML-MCNC: 14.6 SECONDS (ref 12.2–14.5)
RBC # BLD AUTO: 3.79 X10(6)UL
RBC # BLD AUTO: 3.8 X10(6)UL
RETICS # AUTO: 111 X10(3) UL (ref 22.5–147.5)
RETICS/RBC NFR AUTO: 2.9 %
SARS-COV-2 RNA RESP QL NAA+PROBE: NOT DETECTED
SED RATE-ML: 26 MM/HR
SODIUM SERPL-SCNC: 137 MMOL/L (ref 136–145)
SODIUM SERPL-SCNC: 138 MMOL/L (ref 136–145)
SP GR UR STRIP.AUTO: 1.01 (ref 1–1.03)
TRIGL SERPL-MCNC: 124 MG/DL (ref 30–149)
TSI SER-ACNC: 2.22 MIU/ML (ref 0.36–3.74)
UROBILINOGEN UR STRIP.AUTO-MCNC: 2 MG/DL
VIT B12 SERPL-MCNC: 180 PG/ML (ref 193–986)
VIT D+METAB SERPL-MCNC: 30.5 NG/ML (ref 30–100)
VLDLC SERPL CALC-MCNC: 22 MG/DL (ref 0–30)
WBC # BLD AUTO: 2 X10(3) UL (ref 4–11)
WBC # BLD AUTO: 2 X10(3) UL (ref 4–11)

## 2021-08-05 PROCEDURE — 99220 INITIAL OBSERVATION CARE,LEVL III: CPT | Performed by: HOSPITALIST

## 2021-08-05 PROCEDURE — 84443 ASSAY THYROID STIM HORMONE: CPT

## 2021-08-05 PROCEDURE — 82306 VITAMIN D 25 HYDROXY: CPT

## 2021-08-05 PROCEDURE — 80053 COMPREHEN METABOLIC PANEL: CPT

## 2021-08-05 PROCEDURE — 85025 COMPLETE CBC W/AUTO DIFF WBC: CPT

## 2021-08-05 PROCEDURE — 80061 LIPID PANEL: CPT

## 2021-08-05 PROCEDURE — 36415 COLL VENOUS BLD VENIPUNCTURE: CPT

## 2021-08-05 RX ORDER — KETOROLAC TROMETHAMINE 30 MG/ML
30 INJECTION, SOLUTION INTRAMUSCULAR; INTRAVENOUS ONCE
Status: COMPLETED | OUTPATIENT
Start: 2021-08-05 | End: 2021-08-05

## 2021-08-05 NOTE — TELEPHONE ENCOUNTER
Call to patient with plan to discuss critically low absolute neutrophil count that resulted from CBC drawn today, assess for any concerning symptoms, and advise to present to ED for repeat CBC and further evaluation  No answer  Left voicemail instructing p

## 2021-08-05 NOTE — TELEPHONE ENCOUNTER
Pt returned message I had just left her  Discussed severely low absolute neutrophil count of 360 that resulted on labs that she had drawn this AM  Pt says she has actually been feeling \"crummy\" for last several days with multiple vague systemic symptoms

## 2021-08-06 ENCOUNTER — APPOINTMENT (OUTPATIENT)
Dept: CT IMAGING | Facility: HOSPITAL | Age: 41
End: 2021-08-06
Attending: INTERNAL MEDICINE
Payer: COMMERCIAL

## 2021-08-06 ENCOUNTER — APPOINTMENT (OUTPATIENT)
Dept: CT IMAGING | Facility: HOSPITAL | Age: 41
End: 2021-08-06
Attending: HOSPITALIST
Payer: COMMERCIAL

## 2021-08-06 PROBLEM — D70.9 NEUTROPENIA, UNSPECIFIED TYPE (HCC): Status: ACTIVE | Noted: 2021-08-06

## 2021-08-06 PROBLEM — R63.4 WEIGHT LOSS: Status: ACTIVE | Noted: 2021-08-06

## 2021-08-06 PROBLEM — D70.9 NEUTROPENIA, UNSPECIFIED TYPE: Status: ACTIVE | Noted: 2021-08-06

## 2021-08-06 LAB
ANA SCREEN: NEGATIVE
ANION GAP SERPL CALC-SCNC: 3 MMOL/L (ref 0–18)
BASOPHILS # BLD AUTO: 0.04 X10(3) UL (ref 0–0.2)
BASOPHILS NFR BLD AUTO: 2 %
BILIRUB UR QL STRIP.AUTO: NEGATIVE
BUN BLD-MCNC: 8 MG/DL (ref 7–18)
CALCIUM BLD-MCNC: 8.4 MG/DL (ref 8.5–10.1)
CHLORIDE SERPL-SCNC: 111 MMOL/L (ref 98–112)
CO2 SERPL-SCNC: 26 MMOL/L (ref 21–32)
COLOR UR AUTO: YELLOW
CREAT BLD-MCNC: 0.75 MG/DL
EOSINOPHIL # BLD AUTO: 0.06 X10(3) UL (ref 0–0.7)
EOSINOPHIL NFR BLD AUTO: 3 %
ERYTHROCYTE [DISTWIDTH] IN BLOOD BY AUTOMATED COUNT: 13.7 %
GLUCOSE BLD-MCNC: 88 MG/DL (ref 70–99)
GLUCOSE UR STRIP.AUTO-MCNC: NEGATIVE MG/DL
HAV IGM SER QL: 2.3 MG/DL (ref 1.6–2.6)
HAV IGM SER QL: NONREACTIVE
HBV CORE IGM SER QL: NONREACTIVE
HBV SURFACE AG SERPL QL IA: NONREACTIVE
HCT VFR BLD AUTO: 33 %
HCV AB SERPL QL IA: NONREACTIVE
HGB BLD-MCNC: 11.6 G/DL
IMM GRANULOCYTES # BLD AUTO: 0.03 X10(3) UL (ref 0–1)
IMM GRANULOCYTES NFR BLD: 1.5 %
INR BLD: 1.09 (ref 0.89–1.11)
KETONES UR STRIP.AUTO-MCNC: NEGATIVE MG/DL
LYMPHOCYTES # BLD AUTO: 1.06 X10(3) UL (ref 1–4)
LYMPHOCYTES NFR BLD AUTO: 52.5 %
MCH RBC QN AUTO: 32.7 PG (ref 26–34)
MCHC RBC AUTO-ENTMCNC: 35.2 G/DL (ref 31–37)
MCV RBC AUTO: 93 FL
MONOCYTES # BLD AUTO: 0.64 X10(3) UL (ref 0.1–1)
MONOCYTES NFR BLD AUTO: 31.7 %
NEUTROPHILS # BLD AUTO: 0.19 X10 (3) UL (ref 1.5–7.7)
NEUTROPHILS # BLD AUTO: 0.19 X10(3) UL (ref 1.5–7.7)
NEUTROPHILS NFR BLD AUTO: 9.3 %
NITRITE UR QL STRIP.AUTO: NEGATIVE
OSMOLALITY SERPL CALC.SUM OF ELEC: 288 MOSM/KG (ref 275–295)
PH UR STRIP.AUTO: 5 [PH] (ref 5–8)
PLATELET # BLD AUTO: 146 10(3)UL (ref 150–450)
POTASSIUM SERPL-SCNC: 3.8 MMOL/L (ref 3.5–5.1)
PROT UR STRIP.AUTO-MCNC: NEGATIVE MG/DL
PSA SERPL DL<=0.01 NG/ML-MCNC: 14.3 SECONDS (ref 12.2–14.5)
RBC # BLD AUTO: 3.55 X10(6)UL
SODIUM SERPL-SCNC: 140 MMOL/L (ref 136–145)
SP GR UR STRIP.AUTO: 1.02 (ref 1–1.03)
UROBILINOGEN UR STRIP.AUTO-MCNC: 4 MG/DL
WBC # BLD AUTO: 2 X10(3) UL (ref 4–11)

## 2021-08-06 PROCEDURE — 70491 CT SOFT TISSUE NECK W/DYE: CPT | Performed by: HOSPITALIST

## 2021-08-06 PROCEDURE — 99152 MOD SED SAME PHYS/QHP 5/>YRS: CPT | Performed by: INTERNAL MEDICINE

## 2021-08-06 PROCEDURE — 079T3ZX DRAINAGE OF BONE MARROW, PERCUTANEOUS APPROACH, DIAGNOSTIC: ICD-10-PCS | Performed by: RADIOLOGY

## 2021-08-06 PROCEDURE — 74177 CT ABD & PELVIS W/CONTRAST: CPT | Performed by: HOSPITALIST

## 2021-08-06 PROCEDURE — 99245 OFF/OP CONSLTJ NEW/EST HI 55: CPT | Performed by: INTERNAL MEDICINE

## 2021-08-06 PROCEDURE — 38222 DX BONE MARROW BX & ASPIR: CPT | Performed by: INTERNAL MEDICINE

## 2021-08-06 PROCEDURE — 77012 CT SCAN FOR NEEDLE BIOPSY: CPT | Performed by: INTERNAL MEDICINE

## 2021-08-06 PROCEDURE — 07DR3ZX EXTRACTION OF ILIAC BONE MARROW, PERCUTANEOUS APPROACH, DIAGNOSTIC: ICD-10-PCS | Performed by: RADIOLOGY

## 2021-08-06 PROCEDURE — 99225 SUBSEQUENT OBSERVATION CARE: CPT | Performed by: STUDENT IN AN ORGANIZED HEALTH CARE EDUCATION/TRAINING PROGRAM

## 2021-08-06 PROCEDURE — 71260 CT THORAX DX C+: CPT | Performed by: HOSPITALIST

## 2021-08-06 RX ORDER — LORAZEPAM 2 MG/ML
0.5 INJECTION INTRAMUSCULAR ONCE AS NEEDED
Status: ACTIVE | OUTPATIENT
Start: 2021-08-06 | End: 2021-08-06

## 2021-08-06 RX ORDER — SODIUM CHLORIDE 9 MG/ML
INJECTION, SOLUTION INTRAVENOUS CONTINUOUS
Status: DISCONTINUED | OUTPATIENT
Start: 2021-08-06 | End: 2021-08-06 | Stop reason: HOSPADM

## 2021-08-06 RX ORDER — MORPHINE SULFATE 2 MG/ML
0.5 INJECTION, SOLUTION INTRAMUSCULAR; INTRAVENOUS EVERY 4 HOURS PRN
Status: DISCONTINUED | OUTPATIENT
Start: 2021-08-06 | End: 2021-08-08

## 2021-08-06 RX ORDER — CYANOCOBALAMIN 1000 UG/ML
1000 INJECTION INTRAMUSCULAR; SUBCUTANEOUS DAILY
Status: DISCONTINUED | OUTPATIENT
Start: 2021-08-06 | End: 2021-08-08

## 2021-08-06 RX ORDER — MIDAZOLAM HYDROCHLORIDE 1 MG/ML
1 INJECTION INTRAMUSCULAR; INTRAVENOUS EVERY 5 MIN PRN
Status: DISCONTINUED | OUTPATIENT
Start: 2021-08-06 | End: 2021-08-06 | Stop reason: HOSPADM

## 2021-08-06 RX ORDER — MIDAZOLAM HYDROCHLORIDE 1 MG/ML
INJECTION INTRAMUSCULAR; INTRAVENOUS
Status: COMPLETED
Start: 2021-08-06 | End: 2021-08-06

## 2021-08-06 RX ORDER — PROCHLORPERAZINE EDISYLATE 5 MG/ML
5 INJECTION INTRAMUSCULAR; INTRAVENOUS EVERY 8 HOURS PRN
Status: DISCONTINUED | OUTPATIENT
Start: 2021-08-06 | End: 2021-08-08

## 2021-08-06 RX ORDER — MELATONIN
3 NIGHTLY PRN
Status: DISCONTINUED | OUTPATIENT
Start: 2021-08-06 | End: 2021-08-08

## 2021-08-06 RX ORDER — FLUMAZENIL 0.1 MG/ML
0.2 INJECTION, SOLUTION INTRAVENOUS AS NEEDED
Status: DISCONTINUED | OUTPATIENT
Start: 2021-08-06 | End: 2021-08-06 | Stop reason: HOSPADM

## 2021-08-06 RX ORDER — NALOXONE HYDROCHLORIDE 0.4 MG/ML
80 INJECTION, SOLUTION INTRAMUSCULAR; INTRAVENOUS; SUBCUTANEOUS AS NEEDED
Status: DISCONTINUED | OUTPATIENT
Start: 2021-08-06 | End: 2021-08-06 | Stop reason: HOSPADM

## 2021-08-06 RX ORDER — GABAPENTIN 300 MG/1
600 CAPSULE ORAL NIGHTLY
COMMUNITY
End: 2021-11-29

## 2021-08-06 RX ORDER — ACETAMINOPHEN 500 MG
100 TABLET ORAL EVERY 6 HOURS PRN
COMMUNITY
End: 2021-09-20 | Stop reason: ALTCHOICE

## 2021-08-06 RX ORDER — ACETAMINOPHEN 325 MG/1
650 TABLET ORAL EVERY 6 HOURS PRN
Status: DISCONTINUED | OUTPATIENT
Start: 2021-08-06 | End: 2021-08-08

## 2021-08-06 RX ORDER — GABAPENTIN 300 MG/1
600 CAPSULE ORAL NIGHTLY
Status: DISCONTINUED | OUTPATIENT
Start: 2021-08-06 | End: 2021-08-08

## 2021-08-06 RX ORDER — ONDANSETRON 2 MG/ML
4 INJECTION INTRAMUSCULAR; INTRAVENOUS EVERY 6 HOURS PRN
Status: DISCONTINUED | OUTPATIENT
Start: 2021-08-06 | End: 2021-08-08

## 2021-08-06 RX ORDER — LORAZEPAM 2 MG/ML
0.5 INJECTION INTRAMUSCULAR ONCE
Status: COMPLETED | OUTPATIENT
Start: 2021-08-06 | End: 2021-08-06

## 2021-08-06 RX ORDER — IBUPROFEN 200 MG
600 TABLET ORAL EVERY 6 HOURS PRN
Status: ON HOLD | COMMUNITY
End: 2021-08-07

## 2021-08-06 RX ORDER — SODIUM CHLORIDE 9 MG/ML
INJECTION, SOLUTION INTRAVENOUS CONTINUOUS
Status: DISCONTINUED | OUTPATIENT
Start: 2021-08-06 | End: 2021-08-08

## 2021-08-06 RX ORDER — PRAMIPEXOLE DIHYDROCHLORIDE 0.12 MG/1
0.12 TABLET ORAL NIGHTLY
Status: DISCONTINUED | OUTPATIENT
Start: 2021-08-06 | End: 2021-08-08

## 2021-08-06 RX ORDER — METRONIDAZOLE 500 MG/1
500 TABLET ORAL 2 TIMES DAILY
Status: DISCONTINUED | OUTPATIENT
Start: 2021-08-06 | End: 2021-08-08

## 2021-08-06 NOTE — ED QUICK NOTES
Pt still waiitng for clean room on floor. Pt c/o restless leg syndrome for which she takes medication. Reports she has been walking around room to try to relieve them. MD notified.

## 2021-08-06 NOTE — CONSULTS
[facility] Consult [facility]    Reason for Consult: Severe Neutropenia   Medical Record Number: [id number]   CSN: [id number]   Referring Physician: No ref.  provider found  PCP: Elba Sparrow DO    History of Present Illness: 37F with a PMH of lipid 11/10/2011   • Sinusitis    • Sjogren's disease ([facility] [address]    • Sleep apnea     cpap   • Somnambulance     daytime   • Urinary incontinence      Past Surgical History:   Procedure Laterality Date   • D & C     • ORAL SURGERY PROCEDURE      wisdom tooth re  Leukopenia with marked neutropenia.  No circulating blasts.  Mild thrombocytopenia. Reviewed by Dheeraj Oconnor M.D.  Pathology 08/06/21 at 12:03 PM     Assessment and Plan: 40F with a PMH of JOSE presented to the ER on 8/5/21 with neutropenia    Katlyn

## 2021-08-06 NOTE — ED PROVIDER NOTES
Patient Seen in: [facility] Emergency Department      History   Patient presents with:  Abnormal Labs    Stated Complaint: Low WBC    HPI/Subjective:   HPI    26-year-old woman who is usually healthy.   She has been aggressively trying to lose weight Reviewed   URINALYSIS WITH CULTURE REFLEX - Abnormal; Notable for the following components:       Result Value    Clarity Urine Hazy (*)     Blood Urine Small (*)     Urobilinogen Urine 2.0 (*)     Leukocyte Esterase Urine Trace (*)     Bacteria Urine Rare RAPID SARS-COV-2 BY PCR - Normal   CBC WITH DIFFERENTIAL WITH PLATELET    Narrative: The following orders were created for panel order CBC With Differential With Platelet.   Procedure                               Abnormality         Status test, differential diagnosis, and treatment plan. They expressed clear understanding of these instructions and agrees to the plan provided.        Disposition and Plan     Clinical Impression:  Neutropenia, unspecified type ([facility] [address]  (primary encounter diagnos

## 2021-08-06 NOTE — ED INITIAL ASSESSMENT (HPI)
Please call patient to schedule EGD/Colon as per Dr Zaldivar    Patient: Rosette Carcamo    Procedure: EGD and Colonoscopy    Sedation: MAC    Physician: Caleb Zaldivar MD    Diagnosis: Chronic GERD, screening    Pharmacy: n/a    Diabetic: No    Blood Thinners: No     Pt presents to ED with c/o sore throat since Sunday. Pt had negative strep on Sunday. Pt  further reports \"generally not feeling well\" for a week. Weight loss lately, excessive exercise.

## 2021-08-06 NOTE — PROGRESS NOTES
NURSING ADMISSION NOTE      Patient admitted via cart  Oriented to room. Safety precautions initiated. Bed in low position. Call light in reach. Pt received alert and oriented. Denies pain. Oriented to room. Call light within reach.

## 2021-08-06 NOTE — PROGRESS NOTES
Pt returned from CT bone marrow bx, pt with 2x2 and Tegaderm covering site, small drop of dried blood noted. VSS, will monitor site, pt now eating . Denies pain at present .  Will continue to monitor

## 2021-08-06 NOTE — PROCEDURES
[facility]  Procedure Note    [facility] Patient Status:  Observation    10/14/1980 MRN [id number]   [facility] 4NW-A Attending Analisa Jackson MD   Hosp Day # 0 PCP Bernabe Valiente DO     Procedure: CT bone marrow biopsy    Pre-P

## 2021-08-06 NOTE — H&P
[facility]  History and Physical     Gerianne Severe Pretkelis Patient Status:  Emergency    10/14/1980 MRN [id number]   Location [address] [facility] Attending Myrna Ayoub MD   Hosp Day # 0 PCP Anne Casillas DO     Chief Complai Sulfa Antibiotics       UNKNOWN    Comment:Hives  Penicillins             HIVES    Medications:  No current facility-administered medications on file prior to encounter.   metRONIDAZOLE (FLAGYL) 500 MG Oral Tab, Take 1 tablet (500 mg total) by mouth 2 (tw extremities. Extremities: No edema or cyanosis. Integument: No rashes or lesions. Psychiatric: Appropriate mood and affect. Diagnostic Data:      Labs:  Recent Labs   Lab 08/05/21  0725 08/05/21 1956 08/05/21 2127   WBC 2.0* 2.0*  --    HGB 11. 9

## 2021-08-06 NOTE — H&P
[address] BETHEA Pretkelis Patient Status:  Observation    10/14/1980 MRN [id number]   [facility] 4NW-A Attending Jaimie Parada MD   Hosp Day # 0 PCP Malon Crigler, DO     Admitting Diagnosis:   Neutropenia 98.5 °F (36.9 °C) (Oral)   Resp 18   Ht 65\"   Wt 186 lb   LMP 07/19/2021 (Exact Date)   SpO2 98%   BMI 30.95 kg/m²     General: NAD  Neck: No JVD  Lungs: CTA bilat  Heart: RRR, S1, S2  Abdomen: Soft, NT/ND, BS+x4  Extremities: Warm, dry, no LE edema bilat

## 2021-08-06 NOTE — IMAGING NOTE
Kristyteressa Naidu, name, date of birth and allergies verified with pt. Pt arrived for a CT-guided bone marrow biopsy. Informed consent obtained by Dr Barbara Valencia. Positioned pt  prone on scanner. Universal protocol performed.   Site prepped, draped and local

## 2021-08-06 NOTE — PROGRESS NOTES
[facility]  Progress Note     Robert Chavez Patient Status:  Observation    10/14/1980 MRN [id number]   [facility] 4NW-A Attending Analisa Jackson MD   Hosp Day # 0 PCP Bernabe Valiente DO     Chief Complaint: Neutropenia     S: Pa INR 1.12* 1.09            COVID-19 Lab Results    COVID-19  Lab Results   Component Value Date    COVID19 Not Detected 08/05/2021    COVID19 Not Detected 07/27/2020    COVID19 Not Detected 06/20/2020       Pro-Calcitonin  No results for input(s): PCT in

## 2021-08-07 VITALS
HEIGHT: 65 IN | TEMPERATURE: 99 F | HEART RATE: 74 BPM | OXYGEN SATURATION: 100 % | RESPIRATION RATE: 18 BRPM | WEIGHT: 186 LBS | BODY MASS INDEX: 30.99 KG/M2 | DIASTOLIC BLOOD PRESSURE: 67 MMHG | SYSTOLIC BLOOD PRESSURE: 120 MMHG

## 2021-08-07 LAB
BASOPHILS # BLD: 0 X10(3) UL (ref 0–0.2)
BASOPHILS NFR BLD: 0 %
EOSINOPHIL # BLD: 0.11 X10(3) UL (ref 0–0.7)
EOSINOPHIL NFR BLD: 6 %
ERYTHROCYTE [DISTWIDTH] IN BLOOD BY AUTOMATED COUNT: 13.7 %
HCT VFR BLD AUTO: 31.7 %
HGB BLD-MCNC: 10.8 G/DL
LYMPHOCYTES NFR BLD: 0.92 X10(3) UL (ref 1–4)
LYMPHOCYTES NFR BLD: 51 %
MCH RBC QN AUTO: 32.3 PG (ref 26–34)
MCHC RBC AUTO-ENTMCNC: 34.1 G/DL (ref 31–37)
MCV RBC AUTO: 94.9 FL
METAMYELOCYTES # BLD: 0.05 X10(3) UL
METAMYELOCYTES NFR BLD: 3 %
MONOCYTES # BLD: 0.31 X10(3) UL (ref 0.1–1)
MONOCYTES NFR BLD: 17 %
MORPHOLOGY: NORMAL
NEUTROPHILS # BLD AUTO: 0.2 X10 (3) UL (ref 1.5–7.7)
NEUTROPHILS NFR BLD: 9 %
NEUTS BAND NFR BLD: 14 %
NEUTS HYPERSEG # BLD: 0.41 X10(3) UL (ref 1.5–7.7)
PLATELET # BLD AUTO: 140 10(3)UL (ref 150–450)
PLATELET MORPHOLOGY: NORMAL
RBC # BLD AUTO: 3.34 X10(6)UL
TOTAL CELLS COUNTED: 100
WBC # BLD AUTO: 1.8 X10(3) UL (ref 4–11)

## 2021-08-07 PROCEDURE — 99217 OBSERVATION CARE DISCHARGE: CPT | Performed by: STUDENT IN AN ORGANIZED HEALTH CARE EDUCATION/TRAINING PROGRAM

## 2021-08-07 PROCEDURE — 99225 SUBSEQUENT OBSERVATION CARE: CPT | Performed by: INTERNAL MEDICINE

## 2021-08-07 NOTE — PLAN OF CARE
A/o. VSS. CT neck completed overnight. Denies nausea/vomiting. Lower back bone marrow site clean, dry and intact. Lower back pain control with prn Tylenol/ morphine. IVF infusing. Slept well.     Problem: HEMATOLOGIC - ADULT  Goal: Maintains hematologic sta

## 2021-08-07 NOTE — PROGRESS NOTES
Cleared by all services for discharge , discharge summary completed and discussed , discharged for home , accompanied by spouse

## 2021-08-07 NOTE — DISCHARGE SUMMARY
[facility]OSPITAL[facility]  DISCHARGE SUMMARY     Lj Ledezma Patient Status:  Observation    10/14/1980 MRN [id number]   [facility] 4NW-A Attending Hugh Lake MD   Hosp Day # 0 PCP Charisse Lane DO     Date of Admission: 2021  Date for Pain. Refills: 0     gabapentin 300 MG Caps  Commonly known as: NEURONTIN      Take 600 mg by mouth nightly.    Refills: 0     HYDROcodone-acetaminophen 5-325 MG Tabs  Commonly known as: Norco      Take 1 tablet by mouth every 6 (six) hours as needed

## 2021-08-07 NOTE — PROGRESS NOTES
Heme/Onc Progress Note - [facility]      Chief Complaint:    Follow up for evaluation and management of neutropenia. Interim History:      She had bone marrow biopsy and CT of the neck, CAP yesterday. The CT was non-specfic. The bone marrow is pending.  She ha without air-fluid levels may reflect mild chronic sinusitis.    NECK GLANDS:  The parotid, submandibular, and thyroid glands are unremarkable.     LYMPH NODES:  Enlarged bilateral jugulodigastric chain nodes are noted.  The right-sided node measures 1.3 x 1 cyst measuring 2.5 x 3.3 cm.  There appear to be prominent parametrial vessels noted bilaterally.  Findings may reflect pelvic varices.  Pelvic organs appropriate for patient age.     BONES:  There is mild posterior endplate spurring at Z7-2.  Mild degener [facility]

## 2021-08-07 NOTE — PROGRESS NOTES
[facility]  Progress Note     Anthony Mitchell Patient Status:  Observation    10/14/1980 MRN [id number]   [facility] 4NW-A Attending Hansel Boyd MD   Hosp Day # 0 PCP Thao Galvez DO     Chief Complaint: Neutropenia     S: Pa Lab 08/05/21 2127 08/06/21  0801   PTP 14.6* 14.3   INR 1.12* 1.09            COVID-19 Lab Results    COVID-19  Lab Results   Component Value Date    COVID19 Not Detected 08/05/2021    COVID19 Not Detected 07/27/2020    COVID19 Not Detected 06/20/2020

## 2021-08-08 LAB — COPPER, SERUM: 163.7 UG/DL

## 2021-08-09 ENCOUNTER — TELEPHONE (OUTPATIENT)
Dept: HEMATOLOGY/ONCOLOGY | Facility: HOSPITAL | Age: 41
End: 2021-08-09

## 2021-08-09 ENCOUNTER — TELEPHONE (OUTPATIENT)
Dept: FAMILY MEDICINE CLINIC | Facility: CLINIC | Age: 41
End: 2021-08-09

## 2021-08-09 NOTE — TELEPHONE ENCOUNTER
Spoke with patient. Reassured her that we are still waiting on bone marrow results to discuss in person at upcoming appointment.

## 2021-08-09 NOTE — TELEPHONE ENCOUNTER
Patient has a post hospital visit with doctor on 8/12 and she was wondering what is going to be discussed at this visit, patient is very anxious about her diagnosis and the upcoming visit

## 2021-08-10 ENCOUNTER — OFFICE VISIT (OUTPATIENT)
Dept: FAMILY MEDICINE CLINIC | Facility: CLINIC | Age: 41
End: 2021-08-10

## 2021-08-10 VITALS
HEIGHT: 65 IN | BODY MASS INDEX: 30.32 KG/M2 | OXYGEN SATURATION: 99 % | DIASTOLIC BLOOD PRESSURE: 70 MMHG | HEART RATE: 78 BPM | TEMPERATURE: 98 F | RESPIRATION RATE: 18 BRPM | SYSTOLIC BLOOD PRESSURE: 112 MMHG | WEIGHT: 182 LBS

## 2021-08-10 DIAGNOSIS — E04.9 GOITER: ICD-10-CM

## 2021-08-10 DIAGNOSIS — E53.8 B12 DEFICIENCY: ICD-10-CM

## 2021-08-10 DIAGNOSIS — D70.9 NEUTROPENIA, UNSPECIFIED TYPE (HCC): Primary | ICD-10-CM

## 2021-08-10 DIAGNOSIS — G47.09 OTHER INSOMNIA: ICD-10-CM

## 2021-08-10 PROCEDURE — 3008F BODY MASS INDEX DOCD: CPT | Performed by: FAMILY MEDICINE

## 2021-08-10 PROCEDURE — 99214 OFFICE O/P EST MOD 30 MIN: CPT | Performed by: FAMILY MEDICINE

## 2021-08-10 PROCEDURE — 3074F SYST BP LT 130 MM HG: CPT | Performed by: FAMILY MEDICINE

## 2021-08-10 PROCEDURE — 96372 THER/PROPH/DIAG INJ SC/IM: CPT | Performed by: FAMILY MEDICINE

## 2021-08-10 PROCEDURE — 3078F DIAST BP <80 MM HG: CPT | Performed by: FAMILY MEDICINE

## 2021-08-10 RX ORDER — CYANOCOBALAMIN 1000 UG/ML
1000 INJECTION INTRAMUSCULAR; SUBCUTANEOUS ONCE
Status: COMPLETED | OUTPATIENT
Start: 2021-08-10 | End: 2021-08-10

## 2021-08-10 RX ORDER — ALPRAZOLAM 0.5 MG/1
0.5 TABLET ORAL NIGHTLY PRN
Qty: 20 TABLET | Refills: 0 | Status: SHIPPED | OUTPATIENT
Start: 2021-08-10 | End: 2021-09-20 | Stop reason: ALTCHOICE

## 2021-08-10 RX ADMIN — CYANOCOBALAMIN 1000 MCG: 1000 INJECTION INTRAMUSCULAR; SUBCUTANEOUS at 08:56:00

## 2021-08-10 NOTE — PROGRESS NOTES
HPI:   Katherin Bedoya is a 36year old female who presents for HFU    Pt started to not feel well in April   Pt had many near syncopal episodes   Pt [facility]     Pt started to feel worse 7/29 with fever, chills and sore throat   Pt was pr Ratio      10.0 - 20.0    CALCIUM      8.5 - 10.1 mg/dL 8.4 (L)   CALCULATED OSMOLALITY      275 - 295 mOsm/kg 288   eGFR NON-AFR.  AMERICAN      >=60 100   eGFR AFRICAN AMERICAN      >=60 115   AST (SGOT)      15 - 37 U/L    ALT (SGPT)      13 - 56 U/L Granulocyte Absolute      0.00 - 1.00 x10(3) uL 0.03   Neutrophils %      % 14.6   Lymphocytes %      % 46.2   Monocytes %      % 33.7   Eosinophils %      % 2.0   Basophils %      % 2.0   Immature Granulocyte %      % 1.5   Glucose      70 - 99 mg/dL 95 Medication Sig Dispense Refill   • gabapentin 300 MG Oral Cap Take 600 mg by mouth nightly. • acetaminophen 500 MG Oral Tab Take 100 mg by mouth every 6 (six) hours as needed for Pain.      • metRONIDAZOLE (FLAGYL) 500 MG Oral Tab Take 1 tablet (500 m exertion  CARDIOVASCULAR: denies chest pain on exertion  GI: denies abdominal pain,denies heartburn  MUSCULOSKELETAL: denies back pain  NEURO: denies headaches  PSYCHE: denies depression or anxiety  HEMATOLOGIC: denies hx of anemia  ENDOCRINE: denies thyro

## 2021-08-11 ENCOUNTER — TELEPHONE (OUTPATIENT)
Dept: OBGYN CLINIC | Facility: CLINIC | Age: 41
End: 2021-08-11

## 2021-08-11 LAB
CD10 CELLS NFR SPEC: 1 %
CD11C CELLS NFR SPEC: 15 %
CD14 CELLS NFR SPEC: 2 %
CD19 CELLS NFR SPEC: 12 %
CD19/CD10 CELLS: <1 %
CD20 CELLS NFR SPEC: 12 %
CD22 CELLS NFR SPEC: 12 %
CD23 CELLS NFR SPEC: 1 %
CD3 CELLS NFR SPEC: 72 %
CD3+CD4+ CELLS NFR SPEC: 54 %
CD3+CD4+ CELLS/CD3+CD8+ CLL SPEC: 3.2
CD3+CD8+ CELLS NFR SPEC: 17 %
CD45 CELLS NFR SPEC: 100 %
CD5 CELLS NFR SPEC: 72 %
CD5/CD19 CELLS: 1 %
CD56 CELLS NFR SPEC: 12 %
CD7 CELLS NFR SPEC: 77 %
CELL SURF KAPPA/LAMBDA RATIO: 1.4
CELL SURF LAMBDA LIGHT CHAIN: 5 %
CELL SURFACE KAPPA LIGHT CHAIN: 7 %
FMC7 CELLS NFR SPEC: 3 %
MMA: 0.26 UMOL/L

## 2021-08-11 NOTE — PROGRESS NOTES
Patient aware of blood test results, she has been in the hospital and being worked up for Leukemia. She is being followed by hematology. Patient verbalizes understanding.

## 2021-08-12 ENCOUNTER — OFFICE VISIT (OUTPATIENT)
Dept: HEMATOLOGY/ONCOLOGY | Facility: HOSPITAL | Age: 41
End: 2021-08-12
Attending: INTERNAL MEDICINE
Payer: COMMERCIAL

## 2021-08-12 VITALS
WEIGHT: 184.38 LBS | DIASTOLIC BLOOD PRESSURE: 76 MMHG | HEART RATE: 77 BPM | BODY MASS INDEX: 31 KG/M2 | RESPIRATION RATE: 18 BRPM | TEMPERATURE: 99 F | OXYGEN SATURATION: 96 % | SYSTOLIC BLOOD PRESSURE: 109 MMHG

## 2021-08-12 DIAGNOSIS — M89.9 LYTIC LESION OF BONE ON X-RAY: ICD-10-CM

## 2021-08-12 DIAGNOSIS — D70.9 NEUTROPENIA, UNSPECIFIED TYPE (HCC): ICD-10-CM

## 2021-08-12 DIAGNOSIS — E53.8 VITAMIN B12 DEFICIENCY: Primary | ICD-10-CM

## 2021-08-12 DIAGNOSIS — D70.9 NEUTROPENIA, UNSPECIFIED TYPE (HCC): Primary | ICD-10-CM

## 2021-08-12 LAB
BASOPHILS # BLD: 0.02 X10(3) UL (ref 0–0.2)
BASOPHILS NFR BLD: 1 %
CRP SERPL-MCNC: 0.85 MG/DL (ref ?–0.3)
DEPRECATED HBV CORE AB SER IA-ACNC: 616.2 NG/ML
EOSINOPHIL # BLD: 0.15 X10(3) UL (ref 0–0.7)
EOSINOPHIL NFR BLD: 8 %
ERYTHROCYTE [DISTWIDTH] IN BLOOD BY AUTOMATED COUNT: 14.4 %
HCT VFR BLD AUTO: 33.2 %
HGB BLD-MCNC: 11.1 G/DL
LDH SERPL L TO P-CCNC: 249 U/L
LYMPHOCYTES NFR BLD: 1.2 X10(3) UL (ref 1–4)
LYMPHOCYTES NFR BLD: 63 %
MCH RBC QN AUTO: 31.7 PG (ref 26–34)
MCHC RBC AUTO-ENTMCNC: 33.4 G/DL (ref 31–37)
MCV RBC AUTO: 94.9 FL
MONOCYTES # BLD: 0.32 X10(3) UL (ref 0.1–1)
MONOCYTES NFR BLD: 17 %
NEUTROPHILS # BLD AUTO: 0.33 X10 (3) UL (ref 1.5–7.7)
NEUTROPHILS NFR BLD: 3 %
NEUTS BAND NFR BLD: 8 %
NEUTS HYPERSEG # BLD: 0.21 X10(3) UL (ref 1.5–7.7)
NRBC BLD MANUAL-RTO: 1 %
PLATELET # BLD AUTO: 157 10(3)UL (ref 150–450)
PLATELET MORPHOLOGY: NORMAL
RBC # BLD AUTO: 3.5 X10(6)UL
TOTAL CELLS COUNTED: 100
WBC # BLD AUTO: 1.9 X10(3) UL (ref 4–11)

## 2021-08-12 PROCEDURE — 96372 THER/PROPH/DIAG INJ SC/IM: CPT

## 2021-08-12 PROCEDURE — 99214 OFFICE O/P EST MOD 30 MIN: CPT | Performed by: INTERNAL MEDICINE

## 2021-08-12 RX ORDER — CYANOCOBALAMIN 1000 UG/ML
1000 INJECTION INTRAMUSCULAR; SUBCUTANEOUS ONCE
Status: CANCELLED | OUTPATIENT
Start: 2021-08-19

## 2021-08-12 RX ORDER — CYANOCOBALAMIN 1000 UG/ML
1000 INJECTION INTRAMUSCULAR; SUBCUTANEOUS ONCE
Status: CANCELLED | OUTPATIENT
Start: 2021-08-12

## 2021-08-12 RX ORDER — CYANOCOBALAMIN 1000 UG/ML
1000 INJECTION INTRAMUSCULAR; SUBCUTANEOUS ONCE
Status: COMPLETED | OUTPATIENT
Start: 2021-08-12 | End: 2021-08-12

## 2021-08-12 RX ADMIN — CYANOCOBALAMIN 1000 MCG: 1000 INJECTION INTRAMUSCULAR; SUBCUTANEOUS at 14:36:00

## 2021-08-12 NOTE — PROGRESS NOTES
Education Record    Learner:  Patient    Disease / [place] labs     Barriers / Limitations:  None   Comments:    Method:  Discussion   Comments:    General Topics:  Plan of care reviewed   Comments:    Outcome:  Shows understanding   Comments:

## 2021-08-12 NOTE — PROGRESS NOTES
[facility] Hematology and Oncology Clinic Note    Diagnosis:   1. Pancytopenia  2. B12 deficiency   3. Hepatosplenomegaly  4.  T12 ?lytic lesion    Treatment History: n/a    Visit Diagnosis:  Neutropenia, unspecified type ([facility]  (primary encounter diagnosis)  L cervical LAD has gone down in size. She still feels very fatigued. No fevers or chills. Today, her WBC 1.9, , HB 11.1, Plt 157. CRP down to 0.85, Ferritin 616, .      Review of Systems: 12 Point ROS was completed and pertinent positives ar lipid 11/10/2011   • Sinusitis    • Sjogren's disease ([facility]    • Sleep apnea     cpap   • Somnambulance     daytime   • Urinary incontinence      Past Surgical History:   Procedure Laterality Date   • D & C     • ORAL SURGERY PROCEDURE      wisdom tooth re Value Date     (H) 08/12/2021       Radiology: I personally reviewed the imaging  CT CAP 8/6/21  1.  Bilateral jugulodigastric chain lymphadenopathy.  Differential includes reactive inflammatory lymph nodes.  Other lymphoproliferative processes are n In the core biopsy, there are well circumscribed lymphoid aggregates composed of small mature lymphocytes. Immunoperoxidase stains were performed to further evaluate the bone marrow. CD34 and  show no significant increase in blasts.   CD3 and CD20 de drug toxicity (in particular topiramate which she started on 6/24/21) and lymphoma (given her T12 lesion, mild cervical LAD and hepatosplenomegaly). · Of note, she started Topiramate on 6/24/21.  Prior to this, her CBC was normal. Given her time course, d

## 2021-08-13 LAB
EBV NA IGG SER QL IA: POSITIVE
EBV VCA IGG SER QL IA: POSITIVE
EBV VCA IGM SER QL IA: NEGATIVE

## 2021-08-14 LAB
CMV ANTIBODY IGG: <0.2 U/ML
CMV ANTIBODY IGM: <8 AU/ML

## 2021-08-16 LAB
CYTOMEGALOVIRUS DETECTION, PCR: NOT DETECTED
EBV QUANT., INTERPRETATION: NOT DETECTED
EPSTEIN-BARR VIRUS, QN COPY/ML: <390 CPY/ML
EPSTEIN-BARR VIRUS, QUANT. LOG: <2.6 LOG

## 2021-08-17 LAB
ALBUMIN SERPL ELPH-MCNC: 4.1 G/DL (ref 3.75–5.21)
ALBUMIN/GLOB SERPL: 1.37 {RATIO} (ref 1–2)
ALPHA1 GLOB SERPL ELPH-MCNC: 0.45 G/DL (ref 0.19–0.46)
ALPHA2 GLOB SERPL ELPH-MCNC: 0.54 G/DL (ref 0.48–1.05)
B-GLOBULIN SERPL ELPH-MCNC: 0.87 G/DL (ref 0.68–1.23)
GAMMA GLOB SERPL ELPH-MCNC: 1.14 G/DL (ref 0.62–1.7)
KAPPA LC FREE SER-MCNC: 1.83 MG/DL (ref 0.33–1.94)
KAPPA LC FREE/LAMBDA FREE SER NEPH: 1.26 {RATIO} (ref 0.26–1.65)
LAMBDA LC FREE SERPL-MCNC: 1.45 MG/DL (ref 0.57–2.63)
M PROTEIN MFR SERPL ELPH: 7.1 G/DL (ref 6.4–8.2)

## 2021-08-19 ENCOUNTER — OFFICE VISIT (OUTPATIENT)
Dept: HEMATOLOGY/ONCOLOGY | Facility: HOSPITAL | Age: 41
End: 2021-08-19
Attending: INTERNAL MEDICINE
Payer: COMMERCIAL

## 2021-08-19 ENCOUNTER — TELEPHONE (OUTPATIENT)
Dept: HEMATOLOGY/ONCOLOGY | Facility: HOSPITAL | Age: 41
End: 2021-08-19

## 2021-08-19 DIAGNOSIS — D70.9 NEUTROPENIA, UNSPECIFIED TYPE (HCC): Primary | ICD-10-CM

## 2021-08-19 DIAGNOSIS — E53.8 VITAMIN B12 DEFICIENCY: ICD-10-CM

## 2021-08-19 LAB
BASOPHILS # BLD: 0 X10(3) UL (ref 0–0.2)
BASOPHILS NFR BLD: 0 %
EOSINOPHIL # BLD: 0.06 X10(3) UL (ref 0–0.7)
EOSINOPHIL NFR BLD: 3 %
ERYTHROCYTE [DISTWIDTH] IN BLOOD BY AUTOMATED COUNT: 16.7 %
HCT VFR BLD AUTO: 33.1 %
HGB BLD-MCNC: 11.3 G/DL
LYMPHOCYTES NFR BLD: 1.2 X10(3) UL (ref 1–4)
LYMPHOCYTES NFR BLD: 63 %
MCH RBC QN AUTO: 33.1 PG (ref 26–34)
MCHC RBC AUTO-ENTMCNC: 34.1 G/DL (ref 31–37)
MCV RBC AUTO: 97.1 FL
MONOCYTES # BLD: 0.34 X10(3) UL (ref 0.1–1)
MONOCYTES NFR BLD: 18 %
NEUTROPHILS # BLD AUTO: 0.25 X10 (3) UL (ref 1.5–7.7)
NEUTROPHILS NFR BLD: 5 %
NEUTS BAND NFR BLD: 11 %
NEUTS HYPERSEG # BLD: 0.3 X10(3) UL (ref 1.5–7.7)
PLATELET # BLD AUTO: 163 10(3)UL (ref 150–450)
PLATELET MORPHOLOGY: NORMAL
RBC # BLD AUTO: 3.41 X10(6)UL
TOTAL CELLS COUNTED: 100
WBC # BLD AUTO: 1.9 X10(3) UL (ref 4–11)

## 2021-08-19 PROCEDURE — 85007 BL SMEAR W/DIFF WBC COUNT: CPT

## 2021-08-19 PROCEDURE — 85025 COMPLETE CBC W/AUTO DIFF WBC: CPT

## 2021-08-19 PROCEDURE — 36415 COLL VENOUS BLD VENIPUNCTURE: CPT

## 2021-08-19 PROCEDURE — 85027 COMPLETE CBC AUTOMATED: CPT

## 2021-08-19 PROCEDURE — 96372 THER/PROPH/DIAG INJ SC/IM: CPT

## 2021-08-19 RX ORDER — CYANOCOBALAMIN 1000 UG/ML
1000 INJECTION INTRAMUSCULAR; SUBCUTANEOUS ONCE
Status: CANCELLED | OUTPATIENT
Start: 2021-08-26

## 2021-08-19 RX ORDER — CYANOCOBALAMIN 1000 UG/ML
1000 INJECTION INTRAMUSCULAR; SUBCUTANEOUS ONCE
Status: COMPLETED | OUTPATIENT
Start: 2021-08-19 | End: 2021-08-19

## 2021-08-19 RX ADMIN — CYANOCOBALAMIN 1000 MCG: 1000 INJECTION INTRAMUSCULAR; SUBCUTANEOUS at 09:38:00

## 2021-08-19 NOTE — TELEPHONE ENCOUNTER
Discussed CBC results:    ANC/Monocytes slight better. Hb/Plt are up. She still feels fatigued and has palpable cervical LN. Plan  1. Lab appointment on Wednesday. I added a CBC, LDH, Ferritin, CRP, neutrophil Abfor that day  2.  I ordered an [facility] [employer]. [place]

## 2021-08-20 ENCOUNTER — TELEPHONE (OUTPATIENT)
Dept: HEMATOLOGY/ONCOLOGY | Facility: HOSPITAL | Age: 41
End: 2021-08-20

## 2021-08-20 ENCOUNTER — LAB ENCOUNTER (OUTPATIENT)
Dept: LAB | Facility: HOSPITAL | Age: 41
End: 2021-08-20
Attending: INTERNAL MEDICINE
Payer: COMMERCIAL

## 2021-08-20 ENCOUNTER — HOSPITAL ENCOUNTER (OUTPATIENT)
Dept: MRI IMAGING | Age: 41
Discharge: HOME | End: 2021-08-20
Attending: INTERNAL MEDICINE
Payer: COMMERCIAL

## 2021-08-20 DIAGNOSIS — M89.9 LYTIC LESION OF BONE ON X-RAY: ICD-10-CM

## 2021-08-20 DIAGNOSIS — D70.9 NEUTROPENIA, UNSPECIFIED TYPE (HCC): ICD-10-CM

## 2021-08-20 PROCEDURE — A9575 INJ GADOTERATE MEGLUMI 0.1ML: HCPCS | Performed by: INTERNAL MEDICINE

## 2021-08-20 PROCEDURE — 72158 MRI LUMBAR SPINE W/O & W/DYE: CPT | Performed by: INTERNAL MEDICINE

## 2021-08-20 NOTE — TELEPHONE ENCOUNTER
Tisha Giordano MD  P Edw Joel Wellington Rns  Results reviewed. MRI of her spine looks good, no evidence of cancer. Reviewed the above, she still needs to schedule MRI of thoracic spine, ran out of time due to covid test.   Will call to arrange.    Verbal

## 2021-08-21 LAB — SARS-COV-2 RNA RESP QL NAA+PROBE: NOT DETECTED

## 2021-08-23 ENCOUNTER — HOSPITAL ENCOUNTER (OUTPATIENT)
Dept: ULTRASOUND IMAGING | Facility: HOSPITAL | Age: 41
Discharge: HOME | End: 2021-08-23
Attending: INTERNAL MEDICINE
Payer: COMMERCIAL

## 2021-08-23 DIAGNOSIS — R59.1 LYMPHADENOPATHY: ICD-10-CM

## 2021-08-23 PROCEDURE — 76942 ECHO GUIDE FOR BIOPSY: CPT | Performed by: INTERNAL MEDICINE

## 2021-08-23 PROCEDURE — 38505 NEEDLE BIOPSY LYMPH NODES: CPT | Performed by: INTERNAL MEDICINE

## 2021-08-23 PROCEDURE — 88341 IMHCHEM/IMCYTCHM EA ADD ANTB: CPT | Performed by: INTERNAL MEDICINE

## 2021-08-23 PROCEDURE — 88342 IMHCHEM/IMCYTCHM 1ST ANTB: CPT | Performed by: INTERNAL MEDICINE

## 2021-08-23 PROCEDURE — 88307 TISSUE EXAM BY PATHOLOGIST: CPT | Performed by: INTERNAL MEDICINE

## 2021-08-23 NOTE — PROCEDURES
[facility] [place] Patient Status:  Outpatient    10/14/1980 MRN [id number]   Location [address] [place] [address] Attending Graeme Hewitt MD   [facility] Day # 0 PCP Mary Ellen Hoang DO         Brief Procedure Report    Pre-Operative Diagnos

## 2021-08-23 NOTE — PROCEDURES
[facility]  Pre-Procedure Note    Name: Trudy Ruff  MRN#: [id number]  : 10/14/1980    Procedure:  Ultrasound Guided Lymph node core needle Biopsy    Indication: Lymphadenopathy    Allergies:      Sulfa Antibiotics       UNKNOWN    Comment:Hi

## 2021-08-24 ENCOUNTER — SOCIAL WORK SERVICES (OUTPATIENT)
Dept: HEMATOLOGY/ONCOLOGY | Facility: HOSPITAL | Age: 41
End: 2021-08-24

## 2021-08-24 NOTE — PROGRESS NOTES
Joseph spoke with patient about [facility] paperwork. Patient requested a continuous leave starting 8/23/2021. Dr. Stephani Chi gave end date as 11/28/2021.  Sw completed paperwork and faxed to [phone] at patient request.

## 2021-08-25 ENCOUNTER — TELEPHONE (OUTPATIENT)
Dept: HEMATOLOGY/ONCOLOGY | Facility: HOSPITAL | Age: 41
End: 2021-08-25

## 2021-08-25 ENCOUNTER — OFFICE VISIT (OUTPATIENT)
Dept: HEMATOLOGY/ONCOLOGY | Facility: HOSPITAL | Age: 41
End: 2021-08-25
Attending: INTERNAL MEDICINE
Payer: COMMERCIAL

## 2021-08-25 VITALS
TEMPERATURE: 98 F | OXYGEN SATURATION: 99 % | HEART RATE: 84 BPM | RESPIRATION RATE: 16 BRPM | DIASTOLIC BLOOD PRESSURE: 89 MMHG | SYSTOLIC BLOOD PRESSURE: 131 MMHG

## 2021-08-25 DIAGNOSIS — D70.9 NEUTROPENIA, UNSPECIFIED TYPE (HCC): Primary | ICD-10-CM

## 2021-08-25 DIAGNOSIS — R59.1 LYMPHADENOPATHY: ICD-10-CM

## 2021-08-25 DIAGNOSIS — E53.8 VITAMIN B12 DEFICIENCY: ICD-10-CM

## 2021-08-25 LAB
BASOPHILS # BLD: 0 X10(3) UL (ref 0–0.2)
BASOPHILS NFR BLD: 0 %
CRP SERPL-MCNC: 0.44 MG/DL (ref ?–0.3)
DEPRECATED HBV CORE AB SER IA-ACNC: 396.9 NG/ML
EOSINOPHIL # BLD: 0.18 X10(3) UL (ref 0–0.7)
EOSINOPHIL NFR BLD: 9 %
ERYTHROCYTE [DISTWIDTH] IN BLOOD BY AUTOMATED COUNT: 16.1 %
HCT VFR BLD AUTO: 34.7 %
HGB BLD-MCNC: 11.5 G/DL
LDH SERPL L TO P-CCNC: 231 U/L
LYMPHOCYTES NFR BLD: 1.18 X10(3) UL (ref 1–4)
LYMPHOCYTES NFR BLD: 59 %
MCH RBC QN AUTO: 32 PG (ref 26–34)
MCHC RBC AUTO-ENTMCNC: 33.1 G/DL (ref 31–37)
MCV RBC AUTO: 96.7 FL
METAMYELOCYTES # BLD: 0.02 X10(3) UL
METAMYELOCYTES NFR BLD: 1 %
MONOCYTES # BLD: 0.16 X10(3) UL (ref 0.1–1)
MONOCYTES NFR BLD: 8 %
NEUTROPHILS # BLD AUTO: 0.29 X10 (3) UL (ref 1.5–7.7)
NEUTROPHILS NFR BLD: 18 %
NEUTS BAND NFR BLD: 5 %
NEUTS HYPERSEG # BLD: 0.46 X10(3) UL (ref 1.5–7.7)
PLATELET # BLD AUTO: 151 10(3)UL (ref 150–450)
RBC # BLD AUTO: 3.59 X10(6)UL
TOTAL CELLS COUNTED: 100
WBC # BLD AUTO: 2 X10(3) UL (ref 4–11)

## 2021-08-25 PROCEDURE — 85007 BL SMEAR W/DIFF WBC COUNT: CPT

## 2021-08-25 PROCEDURE — 83615 LACTATE (LD) (LDH) ENZYME: CPT

## 2021-08-25 PROCEDURE — 96372 THER/PROPH/DIAG INJ SC/IM: CPT

## 2021-08-25 PROCEDURE — 36415 COLL VENOUS BLD VENIPUNCTURE: CPT

## 2021-08-25 PROCEDURE — 86140 C-REACTIVE PROTEIN: CPT

## 2021-08-25 PROCEDURE — 85025 COMPLETE CBC W/AUTO DIFF WBC: CPT

## 2021-08-25 PROCEDURE — 82728 ASSAY OF FERRITIN: CPT

## 2021-08-25 PROCEDURE — 86021 WBC ANTIBODY IDENTIFICATION: CPT

## 2021-08-25 PROCEDURE — 81342 TRG GENE REARRANGEMENT ANAL: CPT

## 2021-08-25 PROCEDURE — 85027 COMPLETE CBC AUTOMATED: CPT

## 2021-08-25 RX ORDER — CYANOCOBALAMIN 1000 UG/ML
1000 INJECTION INTRAMUSCULAR; SUBCUTANEOUS ONCE
Status: COMPLETED | OUTPATIENT
Start: 2021-08-25 | End: 2021-08-25

## 2021-08-25 RX ORDER — ACYCLOVIR 400 MG/1
400 TABLET ORAL 2 TIMES DAILY
Qty: 30 TABLET | Refills: 0 | Status: SHIPPED | OUTPATIENT
Start: 2021-08-25 | End: 2021-09-01

## 2021-08-25 RX ORDER — PREDNISONE 20 MG/1
60 TABLET ORAL DAILY
Qty: 21 TABLET | Refills: 0 | Status: SHIPPED | OUTPATIENT
Start: 2021-08-25 | End: 2021-09-01

## 2021-08-25 RX ORDER — CYANOCOBALAMIN 1000 UG/ML
1000 INJECTION INTRAMUSCULAR; SUBCUTANEOUS ONCE
Status: CANCELLED | OUTPATIENT
Start: 2021-09-02

## 2021-08-25 RX ORDER — LEVOFLOXACIN 750 MG/1
750 TABLET ORAL DAILY
Qty: 10 TABLET | Refills: 0 | Status: SHIPPED | OUTPATIENT
Start: 2021-08-25 | End: 2021-09-01

## 2021-08-25 RX ADMIN — CYANOCOBALAMIN 1000 MCG: 1000 INJECTION INTRAMUSCULAR; SUBCUTANEOUS at 13:05:00

## 2021-08-25 NOTE — PROGRESS NOTES
Education Record    Learner:  Patient    Disease / Unaeta Ember b12 injection     Barriers / Limitations:  None   Comments:    Method:  Discussion   Comments:    General Topics:  Medication and Plan of care reviewed   Comments:    Outcome:  Shows unde

## 2021-08-25 NOTE — TELEPHONE ENCOUNTER
Called patient with results:    1. [address] slowly improving. LDH, ferritin and CRP also better. 2. May have developed small sores in mouth  3. Since her bone marrow shows adequate WBC production, suspect possible autoimmune effect.  Will Rx prednisone 60 mg x

## 2021-08-26 ENCOUNTER — APPOINTMENT (OUTPATIENT)
Dept: HEMATOLOGY/ONCOLOGY | Facility: HOSPITAL | Age: 41
End: 2021-08-26
Attending: INTERNAL MEDICINE
Payer: COMMERCIAL

## 2021-08-27 ENCOUNTER — OFFICE VISIT (OUTPATIENT)
Dept: OBGYN CLINIC | Facility: CLINIC | Age: 41
End: 2021-08-27

## 2021-08-27 VITALS
BODY MASS INDEX: 30 KG/M2 | RESPIRATION RATE: 16 BRPM | DIASTOLIC BLOOD PRESSURE: 70 MMHG | HEART RATE: 84 BPM | WEIGHT: 180 LBS | SYSTOLIC BLOOD PRESSURE: 110 MMHG

## 2021-08-27 DIAGNOSIS — N89.8 VAGINAL DISCHARGE: Primary | ICD-10-CM

## 2021-08-27 PROCEDURE — 87660 TRICHOMONAS VAGIN DIR PROBE: CPT | Performed by: OBSTETRICS & GYNECOLOGY

## 2021-08-27 PROCEDURE — 3074F SYST BP LT 130 MM HG: CPT | Performed by: OBSTETRICS & GYNECOLOGY

## 2021-08-27 PROCEDURE — 87480 CANDIDA DNA DIR PROBE: CPT | Performed by: OBSTETRICS & GYNECOLOGY

## 2021-08-27 PROCEDURE — 3078F DIAST BP <80 MM HG: CPT | Performed by: OBSTETRICS & GYNECOLOGY

## 2021-08-27 PROCEDURE — 87510 GARDNER VAG DNA DIR PROBE: CPT | Performed by: OBSTETRICS & GYNECOLOGY

## 2021-08-27 PROCEDURE — 99213 OFFICE O/P EST LOW 20 MIN: CPT | Performed by: OBSTETRICS & GYNECOLOGY

## 2021-08-27 NOTE — PROGRESS NOTES
Chaparro Mueller is a 36year old female. HPI:   Patient presents with:  Vaginal Discharge    Was in the hospital with severe neutropenia. Did not finish medication for BV.   Noticed vaginal discharge but no odor would like to be reevaluated      Medi

## 2021-08-30 LAB — NEUTROPHIL ASSOCIATED AB: NEGATIVE

## 2021-09-01 LAB — T-CELL CLONALITY BY PCR: NOT DETECTED

## 2021-09-01 NOTE — PROGRESS NOTES
Podcast Ready Message sent with normal results. Chris Bradshaw,    Your recent vaginal swab testing was negative for any vaginal infection. If you have any questions or concerns, please contact the office.     Thank you,    Aleks Garcia RN

## 2021-09-02 ENCOUNTER — OFFICE VISIT (OUTPATIENT)
Dept: HEMATOLOGY/ONCOLOGY | Facility: HOSPITAL | Age: 41
End: 2021-09-02
Attending: INTERNAL MEDICINE
Payer: COMMERCIAL

## 2021-09-02 ENCOUNTER — TELEPHONE (OUTPATIENT)
Dept: HEMATOLOGY/ONCOLOGY | Facility: HOSPITAL | Age: 41
End: 2021-09-02

## 2021-09-02 VITALS
WEIGHT: 181 LBS | RESPIRATION RATE: 16 BRPM | DIASTOLIC BLOOD PRESSURE: 89 MMHG | OXYGEN SATURATION: 98 % | BODY MASS INDEX: 30.16 KG/M2 | SYSTOLIC BLOOD PRESSURE: 138 MMHG | HEART RATE: 65 BPM | HEIGHT: 65 IN | TEMPERATURE: 99 F

## 2021-09-02 DIAGNOSIS — D70.9 NEUTROPENIA, UNSPECIFIED TYPE (HCC): Primary | ICD-10-CM

## 2021-09-02 DIAGNOSIS — E53.8 VITAMIN B12 DEFICIENCY: ICD-10-CM

## 2021-09-02 LAB
BASOPHILS # BLD AUTO: 0.03 X10(3) UL (ref 0–0.2)
BASOPHILS NFR BLD AUTO: 0.6 %
EOSINOPHIL # BLD AUTO: 0.13 X10(3) UL (ref 0–0.7)
EOSINOPHIL NFR BLD AUTO: 2.5 %
ERYTHROCYTE [DISTWIDTH] IN BLOOD BY AUTOMATED COUNT: 15.2 %
HCT VFR BLD AUTO: 38.6 %
HGB BLD-MCNC: 12.6 G/DL
IMM GRANULOCYTES # BLD AUTO: 0.09 X10(3) UL (ref 0–1)
IMM GRANULOCYTES NFR BLD: 1.8 %
LYMPHOCYTES # BLD AUTO: 2.55 X10(3) UL (ref 1–4)
LYMPHOCYTES NFR BLD AUTO: 49.6 %
MCH RBC QN AUTO: 32.1 PG (ref 26–34)
MCHC RBC AUTO-ENTMCNC: 32.6 G/DL (ref 31–37)
MCV RBC AUTO: 98.2 FL
MONOCYTES # BLD AUTO: 0.79 X10(3) UL (ref 0.1–1)
MONOCYTES NFR BLD AUTO: 15.4 %
NEUTROPHILS # BLD AUTO: 1.55 X10 (3) UL (ref 1.5–7.7)
NEUTROPHILS # BLD AUTO: 1.55 X10(3) UL (ref 1.5–7.7)
NEUTROPHILS NFR BLD AUTO: 30.1 %
PLATELET # BLD AUTO: 176 10(3)UL (ref 150–450)
RBC # BLD AUTO: 3.93 X10(6)UL
WBC # BLD AUTO: 5.1 X10(3) UL (ref 4–11)

## 2021-09-02 PROCEDURE — 85025 COMPLETE CBC W/AUTO DIFF WBC: CPT

## 2021-09-02 PROCEDURE — 96372 THER/PROPH/DIAG INJ SC/IM: CPT

## 2021-09-02 RX ORDER — CYANOCOBALAMIN 1000 UG/ML
1000 INJECTION INTRAMUSCULAR; SUBCUTANEOUS ONCE
Status: COMPLETED | OUTPATIENT
Start: 2021-09-02 | End: 2021-09-02

## 2021-09-02 RX ORDER — CYANOCOBALAMIN 1000 UG/ML
1000 INJECTION INTRAMUSCULAR; SUBCUTANEOUS ONCE
Status: CANCELLED | OUTPATIENT
Start: 2021-09-09

## 2021-09-02 RX ORDER — PREDNISONE 10 MG/1
TABLET ORAL
Qty: 50 TABLET | Refills: 0 | Status: SHIPPED | OUTPATIENT
Start: 2021-09-02 | End: 2021-09-20 | Stop reason: ALTCHOICE

## 2021-09-02 RX ADMIN — CYANOCOBALAMIN 1000 MCG: 1000 INJECTION INTRAMUSCULAR; SUBCUTANEOUS at 11:35:00

## 2021-09-02 NOTE — TELEPHONE ENCOUNTER
WBC improved with steroids. Will start tapering prednisone by 10 mg every 3 days. [id number] Jemma Vo for [employer]. Ok to go back to work.

## 2021-09-08 ENCOUNTER — HOSPITAL ENCOUNTER (OUTPATIENT)
Dept: ULTRASOUND IMAGING | Age: 41
Discharge: HOME | End: 2021-09-08
Attending: FAMILY MEDICINE
Payer: COMMERCIAL

## 2021-09-08 DIAGNOSIS — E04.9 GOITER: ICD-10-CM

## 2021-09-08 PROCEDURE — 76536 US EXAM OF HEAD AND NECK: CPT | Performed by: FAMILY MEDICINE

## 2021-09-09 ENCOUNTER — HOSPITAL ENCOUNTER (OUTPATIENT)
Dept: MRI IMAGING | Age: 41
Discharge: HOME | End: 2021-09-09
Attending: INTERNAL MEDICINE
Payer: COMMERCIAL

## 2021-09-09 ENCOUNTER — OFFICE VISIT (OUTPATIENT)
Dept: HEMATOLOGY/ONCOLOGY | Facility: HOSPITAL | Age: 41
End: 2021-09-09
Attending: INTERNAL MEDICINE
Payer: COMMERCIAL

## 2021-09-09 DIAGNOSIS — D70.9 NEUTROPENIA, UNSPECIFIED TYPE (HCC): ICD-10-CM

## 2021-09-09 DIAGNOSIS — M89.9 LYTIC LESION OF BONE ON X-RAY: ICD-10-CM

## 2021-09-09 DIAGNOSIS — D70.9 NEUTROPENIA, UNSPECIFIED TYPE (HCC): Primary | ICD-10-CM

## 2021-09-09 DIAGNOSIS — E53.8 VITAMIN B12 DEFICIENCY: ICD-10-CM

## 2021-09-09 LAB
BASOPHILS # BLD AUTO: 0.04 X10(3) UL (ref 0–0.2)
BASOPHILS NFR BLD AUTO: 0.4 %
CRP SERPL-MCNC: <0.29 MG/DL (ref ?–0.3)
DEPRECATED HBV CORE AB SER IA-ACNC: 142.2 NG/ML
EOSINOPHIL # BLD AUTO: 0.17 X10(3) UL (ref 0–0.7)
EOSINOPHIL NFR BLD AUTO: 1.7 %
ERYTHROCYTE [DISTWIDTH] IN BLOOD BY AUTOMATED COUNT: 14.4 %
HCT VFR BLD AUTO: 39.4 %
HGB BLD-MCNC: 13.5 G/DL
IMM GRANULOCYTES # BLD AUTO: 0.04 X10(3) UL (ref 0–1)
IMM GRANULOCYTES NFR BLD: 0.4 %
LDH SERPL L TO P-CCNC: 173 U/L
LYMPHOCYTES # BLD AUTO: 1.93 X10(3) UL (ref 1–4)
LYMPHOCYTES NFR BLD AUTO: 19.3 %
MCH RBC QN AUTO: 32.9 PG (ref 26–34)
MCHC RBC AUTO-ENTMCNC: 34.3 G/DL (ref 31–37)
MCV RBC AUTO: 96.1 FL
MONOCYTES # BLD AUTO: 0.51 X10(3) UL (ref 0.1–1)
MONOCYTES NFR BLD AUTO: 5.1 %
NEUTROPHILS # BLD AUTO: 7.3 X10 (3) UL (ref 1.5–7.7)
NEUTROPHILS # BLD AUTO: 7.3 X10(3) UL (ref 1.5–7.7)
NEUTROPHILS NFR BLD AUTO: 73.1 %
PLATELET # BLD AUTO: 194 10(3)UL (ref 150–450)
RBC # BLD AUTO: 4.1 X10(6)UL
WBC # BLD AUTO: 10 X10(3) UL (ref 4–11)

## 2021-09-09 PROCEDURE — 83615 LACTATE (LD) (LDH) ENZYME: CPT

## 2021-09-09 PROCEDURE — 36415 COLL VENOUS BLD VENIPUNCTURE: CPT

## 2021-09-09 PROCEDURE — 96372 THER/PROPH/DIAG INJ SC/IM: CPT

## 2021-09-09 PROCEDURE — 82728 ASSAY OF FERRITIN: CPT

## 2021-09-09 PROCEDURE — 72157 MRI CHEST SPINE W/O & W/DYE: CPT | Performed by: INTERNAL MEDICINE

## 2021-09-09 PROCEDURE — 86140 C-REACTIVE PROTEIN: CPT

## 2021-09-09 PROCEDURE — A9575 INJ GADOTERATE MEGLUMI 0.1ML: HCPCS | Performed by: INTERNAL MEDICINE

## 2021-09-09 PROCEDURE — 85025 COMPLETE CBC W/AUTO DIFF WBC: CPT

## 2021-09-09 RX ORDER — CYANOCOBALAMIN 1000 UG/ML
1000 INJECTION INTRAMUSCULAR; SUBCUTANEOUS ONCE
Status: COMPLETED | OUTPATIENT
Start: 2021-09-09 | End: 2021-09-09

## 2021-09-09 RX ORDER — CYANOCOBALAMIN 1000 UG/ML
1000 INJECTION INTRAMUSCULAR; SUBCUTANEOUS ONCE
Status: CANCELLED | OUTPATIENT
Start: 2021-09-16

## 2021-09-09 RX ADMIN — CYANOCOBALAMIN 1000 MCG: 1000 INJECTION INTRAMUSCULAR; SUBCUTANEOUS at 11:46:00

## 2021-09-09 NOTE — PROGRESS NOTES
Education Record    Learner:  Patient    Disease / Diagnosis:b12     Barriers / Limitations:  None   Comments:    Method:  Discussion   Comments:    General Topics:  Plan of care reviewed   Comments:    Outcome:  Shows understanding   Comments:

## 2021-09-16 ENCOUNTER — NURSE ONLY (OUTPATIENT)
Dept: HEMATOLOGY/ONCOLOGY | Facility: HOSPITAL | Age: 41
End: 2021-09-16
Attending: INTERNAL MEDICINE
Payer: COMMERCIAL

## 2021-09-16 DIAGNOSIS — D70.9 NEUTROPENIA, UNSPECIFIED TYPE (HCC): ICD-10-CM

## 2021-09-16 LAB
BASOPHILS # BLD AUTO: 0.04 X10(3) UL (ref 0–0.2)
BASOPHILS NFR BLD AUTO: 0.6 %
EOSINOPHIL # BLD AUTO: 0.31 X10(3) UL (ref 0–0.7)
EOSINOPHIL NFR BLD AUTO: 4.3 %
ERYTHROCYTE [DISTWIDTH] IN BLOOD BY AUTOMATED COUNT: 13.5 %
HCT VFR BLD AUTO: 37 %
HGB BLD-MCNC: 12.5 G/DL
IMM GRANULOCYTES # BLD AUTO: 0.03 X10(3) UL (ref 0–1)
IMM GRANULOCYTES NFR BLD: 0.4 %
LYMPHOCYTES # BLD AUTO: 1.42 X10(3) UL (ref 1–4)
LYMPHOCYTES NFR BLD AUTO: 19.7 %
MCH RBC QN AUTO: 32.7 PG (ref 26–34)
MCHC RBC AUTO-ENTMCNC: 33.8 G/DL (ref 31–37)
MCV RBC AUTO: 96.9 FL
MONOCYTES # BLD AUTO: 0.31 X10(3) UL (ref 0.1–1)
MONOCYTES NFR BLD AUTO: 4.3 %
NEUTROPHILS # BLD AUTO: 5.11 X10 (3) UL (ref 1.5–7.7)
NEUTROPHILS # BLD AUTO: 5.11 X10(3) UL (ref 1.5–7.7)
NEUTROPHILS NFR BLD AUTO: 70.7 %
PLATELET # BLD AUTO: 192 10(3)UL (ref 150–450)
RBC # BLD AUTO: 3.82 X10(6)UL
WBC # BLD AUTO: 7.2 X10(3) UL (ref 4–11)

## 2021-09-16 PROCEDURE — 36415 COLL VENOUS BLD VENIPUNCTURE: CPT

## 2021-09-16 PROCEDURE — 85025 COMPLETE CBC W/AUTO DIFF WBC: CPT

## 2021-09-18 ENCOUNTER — HOSPITAL ENCOUNTER (OUTPATIENT)
Dept: GENERAL RADIOLOGY | Facility: HOSPITAL | Age: 41
Discharge: HOME | End: 2021-09-18
Attending: PHYSICIAN ASSISTANT
Payer: COMMERCIAL

## 2021-09-18 DIAGNOSIS — G89.29 CHRONIC BILATERAL LOW BACK PAIN WITH BILATERAL SCIATICA: ICD-10-CM

## 2021-09-18 DIAGNOSIS — M54.42 CHRONIC BILATERAL LOW BACK PAIN WITH BILATERAL SCIATICA: ICD-10-CM

## 2021-09-18 DIAGNOSIS — M54.41 CHRONIC BILATERAL LOW BACK PAIN WITH BILATERAL SCIATICA: ICD-10-CM

## 2021-09-18 PROCEDURE — 72082 X-RAY EXAM ENTIRE SPI 2/3 VW: CPT | Performed by: PHYSICIAN ASSISTANT

## 2021-09-18 PROCEDURE — 72114 X-RAY EXAM L-S SPINE BENDING: CPT | Performed by: PHYSICIAN ASSISTANT

## 2021-09-21 ENCOUNTER — TELEPHONE (OUTPATIENT)
Dept: SURGERY | Facility: CLINIC | Age: 41
End: 2021-09-21

## 2021-09-24 ENCOUNTER — NURSE ONLY (OUTPATIENT)
Dept: HEMATOLOGY/ONCOLOGY | Facility: HOSPITAL | Age: 41
End: 2021-09-24
Attending: INTERNAL MEDICINE
Payer: COMMERCIAL

## 2021-09-24 DIAGNOSIS — D70.9 NEUTROPENIA, UNSPECIFIED TYPE (HCC): ICD-10-CM

## 2021-09-24 LAB
BASOPHILS # BLD AUTO: 0.05 X10(3) UL (ref 0–0.2)
BASOPHILS NFR BLD AUTO: 0.6 %
EOSINOPHIL # BLD AUTO: 0.25 X10(3) UL (ref 0–0.7)
EOSINOPHIL NFR BLD AUTO: 3.1 %
ERYTHROCYTE [DISTWIDTH] IN BLOOD BY AUTOMATED COUNT: 13.1 %
HCT VFR BLD AUTO: 39.2 %
HGB BLD-MCNC: 13.6 G/DL
IMM GRANULOCYTES # BLD AUTO: 0.02 X10(3) UL (ref 0–1)
IMM GRANULOCYTES NFR BLD: 0.2 %
LYMPHOCYTES # BLD AUTO: 1.76 X10(3) UL (ref 1–4)
LYMPHOCYTES NFR BLD AUTO: 21.6 %
MCH RBC QN AUTO: 32.7 PG (ref 26–34)
MCHC RBC AUTO-ENTMCNC: 34.7 G/DL (ref 31–37)
MCV RBC AUTO: 94.2 FL
MONOCYTES # BLD AUTO: 0.49 X10(3) UL (ref 0.1–1)
MONOCYTES NFR BLD AUTO: 6 %
NEUTROPHILS # BLD AUTO: 5.56 X10 (3) UL (ref 1.5–7.7)
NEUTROPHILS # BLD AUTO: 5.56 X10(3) UL (ref 1.5–7.7)
NEUTROPHILS NFR BLD AUTO: 68.5 %
PLATELET # BLD AUTO: 237 10(3)UL (ref 150–450)
RBC # BLD AUTO: 4.16 X10(6)UL
WBC # BLD AUTO: 8.1 X10(3) UL (ref 4–11)

## 2021-09-24 PROCEDURE — 85025 COMPLETE CBC W/AUTO DIFF WBC: CPT

## 2021-09-24 PROCEDURE — 36415 COLL VENOUS BLD VENIPUNCTURE: CPT

## 2021-09-30 ENCOUNTER — NURSE ONLY (OUTPATIENT)
Dept: HEMATOLOGY/ONCOLOGY | Facility: HOSPITAL | Age: 41
End: 2021-09-30
Attending: INTERNAL MEDICINE
Payer: COMMERCIAL

## 2021-09-30 DIAGNOSIS — D70.9 NEUTROPENIA, UNSPECIFIED TYPE (HCC): ICD-10-CM

## 2021-09-30 LAB
BASOPHILS # BLD AUTO: 0.05 X10(3) UL (ref 0–0.2)
BASOPHILS NFR BLD AUTO: 0.6 %
EOSINOPHIL # BLD AUTO: 0.19 X10(3) UL (ref 0–0.7)
EOSINOPHIL NFR BLD AUTO: 2.2 %
ERYTHROCYTE [DISTWIDTH] IN BLOOD BY AUTOMATED COUNT: 12.5 %
HCT VFR BLD AUTO: 40.9 %
HGB BLD-MCNC: 13.7 G/DL
IMM GRANULOCYTES # BLD AUTO: 0.02 X10(3) UL (ref 0–1)
IMM GRANULOCYTES NFR BLD: 0.2 %
LYMPHOCYTES # BLD AUTO: 1.86 X10(3) UL (ref 1–4)
LYMPHOCYTES NFR BLD AUTO: 21.4 %
MCH RBC QN AUTO: 31.9 PG (ref 26–34)
MCHC RBC AUTO-ENTMCNC: 33.5 G/DL (ref 31–37)
MCV RBC AUTO: 95.3 FL
MONOCYTES # BLD AUTO: 0.42 X10(3) UL (ref 0.1–1)
MONOCYTES NFR BLD AUTO: 4.8 %
NEUTROPHILS # BLD AUTO: 6.14 X10 (3) UL (ref 1.5–7.7)
NEUTROPHILS # BLD AUTO: 6.14 X10(3) UL (ref 1.5–7.7)
NEUTROPHILS NFR BLD AUTO: 70.8 %
PLATELET # BLD AUTO: 286 10(3)UL (ref 150–450)
RBC # BLD AUTO: 4.29 X10(6)UL
WBC # BLD AUTO: 8.7 X10(3) UL (ref 4–11)

## 2021-09-30 PROCEDURE — 85025 COMPLETE CBC W/AUTO DIFF WBC: CPT

## 2021-09-30 PROCEDURE — 36415 COLL VENOUS BLD VENIPUNCTURE: CPT

## 2021-10-07 ENCOUNTER — APPOINTMENT (OUTPATIENT)
Dept: HEMATOLOGY/ONCOLOGY | Facility: HOSPITAL | Age: 41
End: 2021-10-07
Attending: INTERNAL MEDICINE
Payer: COMMERCIAL

## 2021-10-08 ENCOUNTER — OFFICE VISIT (OUTPATIENT)
Dept: HEMATOLOGY/ONCOLOGY | Facility: HOSPITAL | Age: 41
End: 2021-10-08
Attending: INTERNAL MEDICINE
Payer: COMMERCIAL

## 2021-10-08 DIAGNOSIS — E53.8 VITAMIN B12 DEFICIENCY: ICD-10-CM

## 2021-10-08 DIAGNOSIS — D70.9 NEUTROPENIA, UNSPECIFIED TYPE (HCC): Primary | ICD-10-CM

## 2021-10-08 PROCEDURE — 85025 COMPLETE CBC W/AUTO DIFF WBC: CPT

## 2021-10-08 PROCEDURE — 96372 THER/PROPH/DIAG INJ SC/IM: CPT

## 2021-10-08 PROCEDURE — 36415 COLL VENOUS BLD VENIPUNCTURE: CPT

## 2021-10-08 RX ORDER — CYANOCOBALAMIN 1000 UG/ML
1000 INJECTION INTRAMUSCULAR; SUBCUTANEOUS ONCE
Status: CANCELLED | OUTPATIENT
Start: 2021-10-15

## 2021-10-08 RX ORDER — CYANOCOBALAMIN 1000 UG/ML
1000 INJECTION INTRAMUSCULAR; SUBCUTANEOUS ONCE
Status: COMPLETED | OUTPATIENT
Start: 2021-10-08 | End: 2021-10-08

## 2021-10-08 RX ADMIN — CYANOCOBALAMIN 1000 MCG: 1000 INJECTION INTRAMUSCULAR; SUBCUTANEOUS at 13:17:00

## 2021-10-08 NOTE — PROGRESS NOTES
Education Record    Learner:  Patient    Disease / Alisador Floras b12 injection    Barriers / Limitations:  None   Comments:    Method:  Discussion   Comments:    General Topics:  Medication and Side effects and symptom management   Comments:    Outcome

## 2021-10-11 ENCOUNTER — TELEPHONE (OUTPATIENT)
Dept: FAMILY MEDICINE CLINIC | Facility: CLINIC | Age: 41
End: 2021-10-11

## 2021-10-11 ENCOUNTER — OFFICE VISIT (OUTPATIENT)
Dept: SURGERY | Facility: CLINIC | Age: 41
End: 2021-10-11

## 2021-10-11 VITALS
SYSTOLIC BLOOD PRESSURE: 110 MMHG | HEIGHT: 65 IN | WEIGHT: 180 LBS | HEART RATE: 92 BPM | BODY MASS INDEX: 29.99 KG/M2 | DIASTOLIC BLOOD PRESSURE: 70 MMHG

## 2021-10-11 DIAGNOSIS — M54.16 LUMBAR RADICULOPATHY: ICD-10-CM

## 2021-10-11 DIAGNOSIS — M54.59 MECHANICAL LOW BACK PAIN: Primary | ICD-10-CM

## 2021-10-11 DIAGNOSIS — M47.816 LUMBAR SPONDYLOSIS: Primary | ICD-10-CM

## 2021-10-11 DIAGNOSIS — M47.816 LUMBAR SPONDYLOSIS: ICD-10-CM

## 2021-10-11 PROCEDURE — 3008F BODY MASS INDEX DOCD: CPT | Performed by: NEUROLOGICAL SURGERY

## 2021-10-11 PROCEDURE — 3078F DIAST BP <80 MM HG: CPT | Performed by: NEUROLOGICAL SURGERY

## 2021-10-11 PROCEDURE — 3074F SYST BP LT 130 MM HG: CPT | Performed by: NEUROLOGICAL SURGERY

## 2021-10-11 PROCEDURE — 99214 OFFICE O/P EST MOD 30 MIN: CPT | Performed by: NEUROLOGICAL SURGERY

## 2021-10-11 NOTE — PROGRESS NOTES
Pt is here regarding: follow up ,surgical discussion, lower back pain, was seeing Dr. Yomaira Farrell     Pain level at this moment:  4/10    What 1 location is your pain most intense:  Lower back into her hips, travels down to her thighs.  She has tingling/numbnes

## 2021-10-11 NOTE — TELEPHONE ENCOUNTER
Pt requesting referrals to see Dr. Susanna Lackey and Dr. Duy Lucas, pain management. She's not reading to go ahead with spinal fusion surgery at this time.

## 2021-10-11 NOTE — TELEPHONE ENCOUNTER
Pt seen by neurosurgery, Dr. Sammie De Los Santos today. She does not want surgery at this time. Requesting referral to pain management, Dr. Rose Najera and Madina Ramachandran, Dr. Evangelina Johnson. Approve referrals?

## 2021-10-11 NOTE — PROGRESS NOTES
[facility]  Neurological Surgery Clinic Note    Name: Katherin Bedoya  : 10/14/1980  MRN: [id number]  PCP: Navneet Gomez DO    CHIEF COMPLAINT:  Low back pain    HISTORY OF PRESENT ILLNESS:  Katherin Bedoya is a 36year old fema within the bodies of T7 and T12 as described above.      Lumbar MRI dated 8/20/2021:  1. Stable mild degenerative changes lumbar spine as above without significant spinal canal stenosis at any level.       2. Stable mild right neural foraminal stenosis at L visit.      REVIEW OF SYSTEMS:  Review of Systems   Gastrointestinal: Negative for nausea and vomiting. Genitourinary: Negative for frequency and urgency. Musculoskeletal: Positive for back pain.    Neurological: Positive for tingling and sensory change the source of the pain can help guide treatment. Common causes of back pain include back muscle strain, herniated discs, spinal stenosis, and other conditions.    There are differences that can be seen between a normal lumbar disc and a degenerative lumber Studies show that 68% of positive discogram patients improve without intervention at 5 years. Secondly, spinal fusion for painful disc may fail in as many as 47% of patients. [Steven AL, Barbara Mirza, Angel PETERSON, Dhiraj BV.   Outcome of Unoperated Discogram Posit

## 2021-10-11 NOTE — TELEPHONE ENCOUNTER
Referral entered for Dr. Leisa Holter, approved. Patient called and I informed her that Dr. Kevin Alvarado said she needs clearance from Dr. Clemente Polanco to see [facility]. Patient will contact Dr. Cleemnte Polanco and get back to us if he thinks it is okay.

## 2021-10-12 ENCOUNTER — PATIENT MESSAGE (OUTPATIENT)
Dept: SURGERY | Facility: CLINIC | Age: 41
End: 2021-10-12

## 2021-10-13 NOTE — TELEPHONE ENCOUNTER
From: Amna Ledezma  To: Veronica Aquino MD  Sent: 10/12/2021 12:32 PM CDT  Subject: Letter and Question    Hi, there. Is it possible to write a letter saying that I need to sit down after standing for an hour or so?  Something that doesn't have me paula

## 2021-10-13 NOTE — TELEPHONE ENCOUNTER
Pt was seen in office on 10/11/21. Letter initiated and can be found in communications tab. - needs review by Dr. Emilio Prater. Dr. Emilio Prater will need to determine if it is okay for her to see chiropractor.

## 2021-10-25 ENCOUNTER — OFFICE VISIT (OUTPATIENT)
Dept: PAIN CLINIC | Facility: CLINIC | Age: 41
End: 2021-10-25

## 2021-10-25 VITALS
OXYGEN SATURATION: 98 % | BODY MASS INDEX: 31 KG/M2 | HEART RATE: 72 BPM | DIASTOLIC BLOOD PRESSURE: 70 MMHG | RESPIRATION RATE: 16 BRPM | SYSTOLIC BLOOD PRESSURE: 110 MMHG | WEIGHT: 185 LBS

## 2021-10-25 DIAGNOSIS — M51.36 DDD (DEGENERATIVE DISC DISEASE), LUMBAR: Primary | ICD-10-CM

## 2021-10-25 PROCEDURE — 3074F SYST BP LT 130 MM HG: CPT | Performed by: ANESTHESIOLOGY

## 2021-10-25 PROCEDURE — 99214 OFFICE O/P EST MOD 30 MIN: CPT | Performed by: ANESTHESIOLOGY

## 2021-10-25 PROCEDURE — 3078F DIAST BP <80 MM HG: CPT | Performed by: ANESTHESIOLOGY

## 2021-10-25 NOTE — PROGRESS NOTES
Patient presents in office today with reported pain in low back pain     Current pain level reported = 5/10    Last reported dose of norco Saturday       Narcotic Contract renewal na    Urine Drug screen na

## 2021-10-25 NOTE — PROGRESS NOTES
Name: Josefine Schwab   : 10/14/1980   DOS: 10/25/2021     Pain Clinic Follow Up Visit:   Patient presents with:   Follow - Up: discuss another RFA      Josefine Schwab is a 39year old female with a history of axial low back pain last seen by me i spine with evidence of broad-based central disc protrusion at L4    ASSESSMENT AND PLAN:   DDD (degenerative disc disease), lumbar  (primary encounter diagnosis)      The patient is a pleasant 44-year-old female with history of low back pain.   She was last

## 2021-10-26 ENCOUNTER — TELEPHONE (OUTPATIENT)
Dept: NEUROLOGY | Facility: CLINIC | Age: 41
End: 2021-10-26

## 2021-10-26 DIAGNOSIS — M51.36 DDD (DEGENERATIVE DISC DISEASE), LUMBAR: Primary | ICD-10-CM

## 2021-10-26 NOTE — TELEPHONE ENCOUNTER
Sent  [facility] clinical notes for authorization of Left L3, 4, 5 RFA    CPT CODE: 48375,[id number] approval

## 2021-10-26 NOTE — TELEPHONE ENCOUNTER
Sent  [facility] clinical notes for authorization of right L3, 4, 5 RFA    CPT CODE: 07260,81361Q7- Pending approval

## 2021-11-08 NOTE — TELEPHONE ENCOUNTER
Pt is calling to check status on Authorization. Pt told [address] Leeroy Espinoza is still pending but will send message to Prior Auth team to check status further. Please advise.

## 2021-11-09 NOTE — TELEPHONE ENCOUNTER
Left L3, 4, 5 RFA CPT CODE: 81341,83521Y5 -APPROVED   EXP: 02/08/22    Received in-basket from [facility] with approval for above.      Notified Patient Navigator for scheduling

## 2021-11-09 NOTE — TELEPHONE ENCOUNTER
right L3, 4, 5 RFA CPT CODE: [id number]   EXP: 02/08/2022     Received in-basket from [facility] with approval for above.      Notified Patient navigator for scheduling

## 2021-11-10 NOTE — TELEPHONE ENCOUNTER
Patient advised of insurance approval to proceed with injections and is agreeable to scheduling. Patient scheduled for procedure, pre-procedure instructions reviewed. Patient prefers conscious sedation.  Reviewed sedation instructions including fasting of 8 in the [place] parking garage and follow the signs to the [employer]. • Please bring your Insurance Card, Photo ID, List of Current Medications and Referral (if applicable) to your appointment. Check in at [facility] ([facility].  [address] that your insurance does not authorize your procedure within 48 hours of the scheduled date, your procedure will be cancelled and rescheduled to a later date.    Please contact your insurance carrier to determine what your financial  responsibility will be

## 2021-11-10 NOTE — TELEPHONE ENCOUNTER
Question Answer   Anesthesia Type Sedation   Provider [facility]   Location Lab   Procedure RFA   Laterality/Level left L3, L4, l5   Medical clearance requested (will send to Pain Navigator) No   Patient has Medicare coverage?  No   Comments (Please list entire p

## 2021-11-10 NOTE — TELEPHONE ENCOUNTER
Question Answer   Anesthesia Type Sedation   Provider Hermes Zuniga   Location Lab   Procedure RFA   Laterality/Level right L3, 4, 5   Medical clearance requested (will send to Pain Navigator) No   Patient has Medicare coverage?  No   Comments (Please list entire pr

## 2021-11-10 NOTE — TELEPHONE ENCOUNTER
Patient advised of insurance approval to proceed with injections and is agreeable to scheduling. Patient scheduled for procedure, pre-procedure instructions reviewed. Patient prefers conscious sedation.  Reviewed sedation instructions including fasting of 8 instructions. • Please park in the [employer] parking garage and follow the signs to the [facility]. • Please bring your Insurance Card, Photo ID, List of Current Medications and Referral (if applicable) to your appointment.   Check in at [facility] (544 procedures. In the event that your insurance does not authorize your procedure within 48 hours of the scheduled date, your procedure will be cancelled and rescheduled to a later date.    Please contact your insurance carrier to determine what your financia

## 2021-11-12 ENCOUNTER — OFFICE VISIT (OUTPATIENT)
Dept: HEMATOLOGY/ONCOLOGY | Facility: HOSPITAL | Age: 41
End: 2021-11-12
Attending: INTERNAL MEDICINE
Payer: COMMERCIAL

## 2021-11-12 DIAGNOSIS — D70.9 NEUTROPENIA, UNSPECIFIED TYPE (HCC): ICD-10-CM

## 2021-11-12 DIAGNOSIS — E53.8 VITAMIN B12 DEFICIENCY: Primary | ICD-10-CM

## 2021-11-12 PROCEDURE — 96372 THER/PROPH/DIAG INJ SC/IM: CPT

## 2021-11-12 RX ORDER — CYANOCOBALAMIN 1000 UG/ML
1000 INJECTION INTRAMUSCULAR; SUBCUTANEOUS ONCE
Status: CANCELLED | OUTPATIENT
Start: 2021-12-10

## 2021-11-12 RX ORDER — CYANOCOBALAMIN 1000 UG/ML
1000 INJECTION INTRAMUSCULAR; SUBCUTANEOUS ONCE
Status: COMPLETED | OUTPATIENT
Start: 2021-11-12 | End: 2021-11-12

## 2021-11-12 RX ADMIN — CYANOCOBALAMIN 1000 MCG: 1000 INJECTION INTRAMUSCULAR; SUBCUTANEOUS at 15:42:00

## 2021-11-15 ENCOUNTER — LAB ENCOUNTER (OUTPATIENT)
Dept: LAB | Facility: HOSPITAL | Age: 41
End: 2021-11-15
Attending: ANESTHESIOLOGY
Payer: COMMERCIAL

## 2021-11-15 DIAGNOSIS — M51.36 DDD (DEGENERATIVE DISC DISEASE), LUMBAR: ICD-10-CM

## 2021-11-17 NOTE — PROGRESS NOTES
This visit is conducted using Telemedicine with live, interactive video and audio.     Telehealth outside of [address] Verbal Consent   I conducted a telehealth visit with Risa Barragan today, 11/16/21, which was completed using two-way, re controlled  no SI/HI  + insomnia/poor sleep  Nl appetite  No anhedonia  No hopelessness  Normal concentration   Has seen a therapist in the past     HPI:     Current Outpatient Medications   Medication Sig Dispense Refill   • HYDROcodone-acetaminophen (NOR nightly as needed. Dispense: 30 tablet; Refill: 0  - OP REFERRAL TO [facility]    Potential side effects discussed at length  The patient indicates understanding of these issues and agrees to the plan.   The patient is asked to return in 1 month or sooner if

## 2021-11-18 ENCOUNTER — APPOINTMENT (OUTPATIENT)
Dept: GENERAL RADIOLOGY | Facility: HOSPITAL | Age: 41
End: 2021-11-18
Attending: ANESTHESIOLOGY
Payer: COMMERCIAL

## 2021-11-18 ENCOUNTER — HOSPITAL ENCOUNTER (OUTPATIENT)
Facility: HOSPITAL | Age: 41
Setting detail: HOSPITAL OUTPATIENT SURGERY
Discharge: HOME | End: 2021-11-18
Attending: ANESTHESIOLOGY | Admitting: ANESTHESIOLOGY
Payer: COMMERCIAL

## 2021-11-18 VITALS
OXYGEN SATURATION: 97 % | TEMPERATURE: 98 F | DIASTOLIC BLOOD PRESSURE: 51 MMHG | RESPIRATION RATE: 18 BRPM | SYSTOLIC BLOOD PRESSURE: 119 MMHG | HEART RATE: 81 BPM

## 2021-11-18 DIAGNOSIS — M51.36 DDD (DEGENERATIVE DISC DISEASE), LUMBAR: ICD-10-CM

## 2021-11-18 PROCEDURE — 99152 MOD SED SAME PHYS/QHP 5/>YRS: CPT | Performed by: ANESTHESIOLOGY

## 2021-11-18 PROCEDURE — 81025 URINE PREGNANCY TEST: CPT

## 2021-11-18 PROCEDURE — 3E0T3TZ INTRODUCTION OF DESTRUCTIVE AGENT INTO PERIPHERAL NERVES AND PLEXI, PERCUTANEOUS APPROACH: ICD-10-PCS | Performed by: ANESTHESIOLOGY

## 2021-11-18 RX ORDER — DIPHENHYDRAMINE HYDROCHLORIDE 50 MG/ML
50 INJECTION INTRAMUSCULAR; INTRAVENOUS ONCE AS NEEDED
Status: DISCONTINUED | OUTPATIENT
Start: 2021-11-18 | End: 2021-11-18

## 2021-11-18 RX ORDER — METHYLPREDNISOLONE ACETATE 40 MG/ML
INJECTION, SUSPENSION INTRA-ARTICULAR; INTRALESIONAL; INTRAMUSCULAR; SOFT TISSUE
Status: DISCONTINUED | OUTPATIENT
Start: 2021-11-18 | End: 2021-11-18

## 2021-11-18 RX ORDER — ONDANSETRON 2 MG/ML
4 INJECTION INTRAMUSCULAR; INTRAVENOUS ONCE AS NEEDED
Status: DISCONTINUED | OUTPATIENT
Start: 2021-11-18 | End: 2021-11-18

## 2021-11-18 RX ORDER — LIDOCAINE HYDROCHLORIDE 10 MG/ML
INJECTION, SOLUTION EPIDURAL; INFILTRATION; INTRACAUDAL; PERINEURAL
Status: DISCONTINUED | OUTPATIENT
Start: 2021-11-18 | End: 2021-11-18

## 2021-11-18 RX ORDER — SODIUM CHLORIDE, SODIUM LACTATE, POTASSIUM CHLORIDE, CALCIUM CHLORIDE 600; 310; 30; 20 MG/100ML; MG/100ML; MG/100ML; MG/100ML
100 INJECTION, SOLUTION INTRAVENOUS CONTINUOUS
Status: DISCONTINUED | OUTPATIENT
Start: 2021-11-18 | End: 2021-11-18

## 2021-11-18 RX ORDER — MIDAZOLAM HYDROCHLORIDE 1 MG/ML
INJECTION INTRAMUSCULAR; INTRAVENOUS
Status: DISCONTINUED | OUTPATIENT
Start: 2021-11-18 | End: 2021-11-18

## 2021-11-18 NOTE — H&P
History & Physical Examination    Patient Name: Carter Higginbotahm  MRN: [id number]  CSN: [id number]  YOB: 1980    Pre-Operative Diagnosis:  DDD (degenerative disc disease), lumbar [M51.36]    Present Illness: Patient with low back pain for History    Tobacco Use      Smoking status: Never Smoker      Smokeless tobacco: Never Used    Alcohol use: Not Currently      SYSTEM Check if Review is Normal Check if Physical Exam is Normal If not normal, please explain:   HEENT [x ] [x ]    NECK & BACK

## 2021-11-18 NOTE — OPERATIVE REPORT
[facility]  Operative Report  2021     Gaurav Daly Patient Status:  Hospital Outpatient Surgery    10/14/1980 MRN [id number]   Location [address] [place] [address] Attending Magdalena Myers MD   [facility] Day # 0 PCP Megan Walton needle was then walked off the bony tissue and advanced 2 to 3 mm to lie along the path of the L2 medial branch nerve. AP and lateral radiographs were obtained to document proper needle position.   Sensory and motor stimulation were then performed, which e

## 2021-11-29 ENCOUNTER — LAB ENCOUNTER (OUTPATIENT)
Dept: LAB | Facility: HOSPITAL | Age: 41
End: 2021-11-29
Attending: ANESTHESIOLOGY
Payer: COMMERCIAL

## 2021-11-29 DIAGNOSIS — M51.36 DDD (DEGENERATIVE DISC DISEASE), LUMBAR: ICD-10-CM

## 2021-12-02 ENCOUNTER — APPOINTMENT (OUTPATIENT)
Dept: GENERAL RADIOLOGY | Facility: HOSPITAL | Age: 41
End: 2021-12-02
Attending: ANESTHESIOLOGY
Payer: COMMERCIAL

## 2021-12-02 ENCOUNTER — HOSPITAL ENCOUNTER (OUTPATIENT)
Facility: HOSPITAL | Age: 41
Setting detail: HOSPITAL OUTPATIENT SURGERY
Discharge: HOME | End: 2021-12-02
Attending: ANESTHESIOLOGY | Admitting: ANESTHESIOLOGY
Payer: COMMERCIAL

## 2021-12-02 VITALS
TEMPERATURE: 98 F | SYSTOLIC BLOOD PRESSURE: 126 MMHG | HEART RATE: 76 BPM | RESPIRATION RATE: 16 BRPM | OXYGEN SATURATION: 100 % | DIASTOLIC BLOOD PRESSURE: 84 MMHG

## 2021-12-02 DIAGNOSIS — M51.36 DDD (DEGENERATIVE DISC DISEASE), LUMBAR: ICD-10-CM

## 2021-12-02 PROCEDURE — 99152 MOD SED SAME PHYS/QHP 5/>YRS: CPT | Performed by: ANESTHESIOLOGY

## 2021-12-02 PROCEDURE — 3E0T3TZ INTRODUCTION OF DESTRUCTIVE AGENT INTO PERIPHERAL NERVES AND PLEXI, PERCUTANEOUS APPROACH: ICD-10-PCS | Performed by: ANESTHESIOLOGY

## 2021-12-02 PROCEDURE — 81025 URINE PREGNANCY TEST: CPT

## 2021-12-02 RX ORDER — DIPHENHYDRAMINE HYDROCHLORIDE 50 MG/ML
50 INJECTION INTRAMUSCULAR; INTRAVENOUS ONCE AS NEEDED
Status: DISCONTINUED | OUTPATIENT
Start: 2021-12-02 | End: 2021-12-02

## 2021-12-02 RX ORDER — MIDAZOLAM HYDROCHLORIDE 1 MG/ML
INJECTION INTRAMUSCULAR; INTRAVENOUS
Status: DISCONTINUED | OUTPATIENT
Start: 2021-12-02 | End: 2021-12-02

## 2021-12-02 RX ORDER — SODIUM CHLORIDE, SODIUM LACTATE, POTASSIUM CHLORIDE, CALCIUM CHLORIDE 600; 310; 30; 20 MG/100ML; MG/100ML; MG/100ML; MG/100ML
100 INJECTION, SOLUTION INTRAVENOUS CONTINUOUS
Status: DISCONTINUED | OUTPATIENT
Start: 2021-12-02 | End: 2021-12-02

## 2021-12-02 RX ORDER — METHYLPREDNISOLONE ACETATE 40 MG/ML
INJECTION, SUSPENSION INTRA-ARTICULAR; INTRALESIONAL; INTRAMUSCULAR; SOFT TISSUE
Status: DISCONTINUED | OUTPATIENT
Start: 2021-12-02 | End: 2021-12-02

## 2021-12-02 RX ORDER — ONDANSETRON 2 MG/ML
4 INJECTION INTRAMUSCULAR; INTRAVENOUS ONCE AS NEEDED
Status: DISCONTINUED | OUTPATIENT
Start: 2021-12-02 | End: 2021-12-02

## 2021-12-02 RX ORDER — LIDOCAINE HYDROCHLORIDE 10 MG/ML
INJECTION, SOLUTION EPIDURAL; INFILTRATION; INTRACAUDAL; PERINEURAL
Status: DISCONTINUED | OUTPATIENT
Start: 2021-12-02 | End: 2021-12-02

## 2021-12-02 NOTE — OPERATIVE REPORT
[facility]  Operative Report  2021     Severiano Slaughter [facility] Patient Status:  Hospital Outpatient Surgery    10/14/1980 MRN [id number]   Location [address] [place] [address] Attending Zack Pascual MD   [facility] Day # 0 SORAYA Preston at left L2-L3 level . The needle was then walked off the bony tissue and advanced 2 to 3 mm to lie along the path of the L2 medial branch nerve. AP and lateral radiographs were obtained to document proper needle position.   Sensory and motor stimulation w

## 2021-12-02 NOTE — H&P
History & Physical Examination    Patient Name: Christofer Lance  MRN: [id number]  CSN: [id number]  YOB: 1980    Pre-Operative Diagnosis:  DDD (degenerative disc disease), lumbar [M51.36]    Present Illness: Patient with lumbar degenerati History   Problem Relation Age of Onset   • Hypertension Mother    • Hypertension Maternal Grandmother    • Cancer Paternal Grandfather      Social History    Tobacco Use      Smoking status: Never Smoker      Smokeless tobacco: Never Used    Alcohol use:

## 2021-12-10 ENCOUNTER — OFFICE VISIT (OUTPATIENT)
Dept: HEMATOLOGY/ONCOLOGY | Facility: HOSPITAL | Age: 41
End: 2021-12-10
Attending: INTERNAL MEDICINE
Payer: COMMERCIAL

## 2021-12-10 VITALS
WEIGHT: 186 LBS | DIASTOLIC BLOOD PRESSURE: 79 MMHG | SYSTOLIC BLOOD PRESSURE: 119 MMHG | HEIGHT: 65 IN | RESPIRATION RATE: 16 BRPM | OXYGEN SATURATION: 100 % | BODY MASS INDEX: 30.99 KG/M2 | HEART RATE: 73 BPM | TEMPERATURE: 98 F

## 2021-12-10 DIAGNOSIS — E53.8 VITAMIN B12 DEFICIENCY: Primary | ICD-10-CM

## 2021-12-10 DIAGNOSIS — D70.9 NEUTROPENIA, UNSPECIFIED TYPE (HCC): ICD-10-CM

## 2021-12-10 PROCEDURE — 96372 THER/PROPH/DIAG INJ SC/IM: CPT

## 2021-12-10 RX ORDER — CYANOCOBALAMIN 1000 UG/ML
1000 INJECTION INTRAMUSCULAR; SUBCUTANEOUS ONCE
Status: COMPLETED | OUTPATIENT
Start: 2021-12-10 | End: 2021-12-10

## 2021-12-10 RX ORDER — CYANOCOBALAMIN 1000 UG/ML
1000 INJECTION INTRAMUSCULAR; SUBCUTANEOUS ONCE
Status: CANCELLED | OUTPATIENT
Start: 2022-01-07

## 2021-12-10 RX ADMIN — CYANOCOBALAMIN 1000 MCG: 1000 INJECTION INTRAMUSCULAR; SUBCUTANEOUS at 12:43:00

## 2022-01-04 ENCOUNTER — PATIENT MESSAGE (OUTPATIENT)
Dept: FAMILY MEDICINE CLINIC | Facility: CLINIC | Age: 42
End: 2022-01-04

## 2022-01-05 ENCOUNTER — PATIENT MESSAGE (OUTPATIENT)
Dept: FAMILY MEDICINE CLINIC | Facility: CLINIC | Age: 42
End: 2022-01-05

## 2022-01-05 NOTE — TELEPHONE ENCOUNTER
From: Yolanda Ledezma  To: Nicko Sands DO  Sent: 1/4/2022 10:14 PM CST  Subject: Referral to derm    Hi there.  Do I have any visits left with dr. Antonia Gutierrez? My chart won't let me make an appointment with him as he's not on the list even though I toshiai

## 2022-01-07 ENCOUNTER — OFFICE VISIT (OUTPATIENT)
Dept: HEMATOLOGY/ONCOLOGY | Facility: HOSPITAL | Age: 42
End: 2022-01-07
Attending: INTERNAL MEDICINE
Payer: COMMERCIAL

## 2022-01-07 DIAGNOSIS — D70.9 NEUTROPENIA, UNSPECIFIED TYPE (HCC): Primary | ICD-10-CM

## 2022-01-07 DIAGNOSIS — E53.8 VITAMIN B12 DEFICIENCY: ICD-10-CM

## 2022-01-07 LAB
BASOPHILS # BLD AUTO: 0.05 X10(3) UL (ref 0–0.2)
BASOPHILS NFR BLD AUTO: 0.7 %
EOSINOPHIL # BLD AUTO: 0.12 X10(3) UL (ref 0–0.7)
EOSINOPHIL NFR BLD AUTO: 1.7 %
ERYTHROCYTE [DISTWIDTH] IN BLOOD BY AUTOMATED COUNT: 13.4 %
HCT VFR BLD AUTO: 41 %
HGB BLD-MCNC: 13.8 G/DL
IMM GRANULOCYTES # BLD AUTO: 0.02 X10(3) UL (ref 0–1)
IMM GRANULOCYTES NFR BLD: 0.3 %
LYMPHOCYTES # BLD AUTO: 1.37 X10(3) UL (ref 1–4)
LYMPHOCYTES NFR BLD AUTO: 19.8 %
MCH RBC QN AUTO: 32.3 PG (ref 26–34)
MCHC RBC AUTO-ENTMCNC: 33.7 G/DL (ref 31–37)
MCV RBC AUTO: 96 FL
MONOCYTES # BLD AUTO: 0.47 X10(3) UL (ref 0.1–1)
MONOCYTES NFR BLD AUTO: 6.8 %
NEUTROPHILS # BLD AUTO: 4.89 X10 (3) UL (ref 1.5–7.7)
NEUTROPHILS # BLD AUTO: 4.89 X10(3) UL (ref 1.5–7.7)
NEUTROPHILS NFR BLD AUTO: 70.7 %
PLATELET # BLD AUTO: 256 10(3)UL (ref 150–450)
RBC # BLD AUTO: 4.27 X10(6)UL
WBC # BLD AUTO: 6.9 X10(3) UL (ref 4–11)

## 2022-01-07 PROCEDURE — 85025 COMPLETE CBC W/AUTO DIFF WBC: CPT

## 2022-01-07 PROCEDURE — 96372 THER/PROPH/DIAG INJ SC/IM: CPT

## 2022-01-07 RX ORDER — CYANOCOBALAMIN 1000 UG/ML
1000 INJECTION INTRAMUSCULAR; SUBCUTANEOUS ONCE
OUTPATIENT
Start: 2022-02-04

## 2022-01-07 RX ORDER — CYANOCOBALAMIN 1000 UG/ML
1000 INJECTION INTRAMUSCULAR; SUBCUTANEOUS ONCE
Status: COMPLETED | OUTPATIENT
Start: 2022-01-07 | End: 2022-01-07

## 2022-01-07 RX ADMIN — CYANOCOBALAMIN 1000 MCG: 1000 INJECTION INTRAMUSCULAR; SUBCUTANEOUS at 13:13:00

## 2022-02-02 ENCOUNTER — TELEMEDICINE (OUTPATIENT)
Dept: FAMILY MEDICINE CLINIC | Facility: CLINIC | Age: 42
End: 2022-02-02
Payer: COMMERCIAL

## 2022-02-02 DIAGNOSIS — G89.29 CHRONIC RADICULAR LUMBAR PAIN: ICD-10-CM

## 2022-02-02 DIAGNOSIS — M51.26 BULGING LUMBAR DISC: ICD-10-CM

## 2022-02-02 DIAGNOSIS — M54.16 CHRONIC RADICULAR LUMBAR PAIN: ICD-10-CM

## 2022-02-02 PROCEDURE — 99214 OFFICE O/P EST MOD 30 MIN: CPT | Performed by: FAMILY MEDICINE

## 2022-02-03 RX ORDER — PREDNISONE 20 MG/1
60 TABLET ORAL DAILY
Qty: 15 TABLET | Refills: 0 | Status: SHIPPED | OUTPATIENT
Start: 2022-02-03 | End: 2022-02-08

## 2022-02-03 RX ORDER — HYDROCODONE BITARTRATE AND ACETAMINOPHEN 5; 325 MG/1; MG/1
1 TABLET ORAL EVERY 6 HOURS PRN
Qty: 30 TABLET | Refills: 0 | Status: SHIPPED | OUTPATIENT
Start: 2022-02-03 | End: 2022-02-16 | Stop reason: DRUGHIGH

## 2022-02-04 ENCOUNTER — APPOINTMENT (OUTPATIENT)
Dept: HEMATOLOGY/ONCOLOGY | Facility: HOSPITAL | Age: 42
End: 2022-02-04
Attending: INTERNAL MEDICINE
Payer: COMMERCIAL

## 2022-02-09 NOTE — PATIENT INSTRUCTIONS
Refill policies:    • Allow 2-3 business days for refills; controlled substances may take longer.   • Contact your pharmacy at least 5 days prior to running out of medication and have them send an electronic request or submit request through the [facility] for the entire amount billed. Precertification and Prior Authorizations  If your physician has recommended that you have a procedure or additional testing performed.   [employer] ([employer]) will contact your insurance carrier to obtain pr room air

## 2022-02-16 ENCOUNTER — APPOINTMENT (OUTPATIENT)
Dept: GENERAL RADIOLOGY | Facility: HOSPITAL | Age: 42
End: 2022-02-16
Attending: EMERGENCY MEDICINE
Payer: COMMERCIAL

## 2022-02-16 ENCOUNTER — HOSPITAL ENCOUNTER (EMERGENCY)
Facility: HOSPITAL | Age: 42
Discharge: HOME | End: 2022-02-16
Attending: EMERGENCY MEDICINE
Payer: COMMERCIAL

## 2022-02-16 VITALS
HEIGHT: 65 IN | OXYGEN SATURATION: 98 % | DIASTOLIC BLOOD PRESSURE: 70 MMHG | SYSTOLIC BLOOD PRESSURE: 115 MMHG | TEMPERATURE: 98 F | WEIGHT: 200 LBS | BODY MASS INDEX: 33.32 KG/M2 | HEART RATE: 88 BPM | RESPIRATION RATE: 18 BRPM

## 2022-02-16 DIAGNOSIS — S39.012A LUMBAR STRAIN, INITIAL ENCOUNTER: Primary | ICD-10-CM

## 2022-02-16 LAB
B-HCG UR QL: NEGATIVE
BILIRUB UR QL STRIP.AUTO: NEGATIVE
COLOR UR AUTO: YELLOW
GLUCOSE UR STRIP.AUTO-MCNC: NEGATIVE MG/DL
NITRITE UR QL STRIP.AUTO: NEGATIVE
PH UR STRIP.AUTO: 5 [PH] (ref 5–8)
PROT UR STRIP.AUTO-MCNC: NEGATIVE MG/DL
RBC UR QL AUTO: NEGATIVE
SP GR UR STRIP.AUTO: 1.02 (ref 1–1.03)
UROBILINOGEN UR STRIP.AUTO-MCNC: <2 MG/DL

## 2022-02-16 PROCEDURE — 72110 X-RAY EXAM L-2 SPINE 4/>VWS: CPT | Performed by: EMERGENCY MEDICINE

## 2022-02-16 PROCEDURE — 96372 THER/PROPH/DIAG INJ SC/IM: CPT

## 2022-02-16 PROCEDURE — 87086 URINE CULTURE/COLONY COUNT: CPT | Performed by: EMERGENCY MEDICINE

## 2022-02-16 PROCEDURE — 99283 EMERGENCY DEPT VISIT LOW MDM: CPT

## 2022-02-16 PROCEDURE — 99284 EMERGENCY DEPT VISIT MOD MDM: CPT

## 2022-02-16 PROCEDURE — 81025 URINE PREGNANCY TEST: CPT

## 2022-02-16 PROCEDURE — 81001 URINALYSIS AUTO W/SCOPE: CPT | Performed by: EMERGENCY MEDICINE

## 2022-02-16 RX ORDER — HYDROCODONE BITARTRATE AND ACETAMINOPHEN 10; 325 MG/1; MG/1
1 TABLET ORAL EVERY 6 HOURS PRN
Qty: 45 TABLET | Refills: 0 | Status: SHIPPED | OUTPATIENT
Start: 2022-02-16

## 2022-02-16 RX ORDER — KETOROLAC TROMETHAMINE 30 MG/ML
60 INJECTION, SOLUTION INTRAMUSCULAR; INTRAVENOUS ONCE
Status: COMPLETED | OUTPATIENT
Start: 2022-02-16 | End: 2022-02-16

## 2022-02-16 RX ORDER — CYCLOBENZAPRINE HCL 10 MG
10 TABLET ORAL 3 TIMES DAILY PRN
Qty: 20 TABLET | Refills: 0 | Status: SHIPPED | OUTPATIENT
Start: 2022-02-16 | End: 2022-02-23

## 2022-02-16 RX ORDER — HYDROCODONE BITARTRATE AND ACETAMINOPHEN 5; 325 MG/1; MG/1
1 TABLET ORAL EVERY 6 HOURS PRN
COMMUNITY
End: 2022-02-25 | Stop reason: DRUGHIGH

## 2022-02-16 RX ORDER — CYCLOBENZAPRINE HCL 10 MG
10 TABLET ORAL ONCE
Status: COMPLETED | OUTPATIENT
Start: 2022-02-16 | End: 2022-02-16

## 2022-02-16 RX ORDER — PREDNISONE 20 MG/1
60 TABLET ORAL DAILY
Qty: 15 TABLET | Refills: 0 | Status: SHIPPED | OUTPATIENT
Start: 2022-02-16 | End: 2022-02-21

## 2022-02-16 RX ORDER — HYDROCODONE BITARTRATE AND ACETAMINOPHEN 5; 325 MG/1; MG/1
1-2 TABLET ORAL EVERY 6 HOURS PRN
Qty: 10 TABLET | Refills: 0 | Status: SHIPPED | OUTPATIENT
Start: 2022-02-16 | End: 2022-02-21

## 2022-02-16 NOTE — TELEPHONE ENCOUNTER
HYDROcodone-acetaminophen (NORCO) 5-325 MG Oral Tab       Pt had a video with LORENA a few weeks ago. She can't get into the specialist until 3/1. She is out of medicine. At the visit LORENA asked her if she wanted 5 or 10 mg.   She would like to go on the higher dose of norco.

## 2022-02-16 NOTE — TELEPHONE ENCOUNTER
Dr. Jacek Morin, please see patient request for increase strength of Norco refill.    Last tele med: 2/2/22

## 2022-02-17 NOTE — ED INITIAL ASSESSMENT (HPI)
Pt here with chronic back issues. Pt report bilateral back pain taking 5/325mg norco.  Pt has a referral but cant see her till march. Pt denies bowel or bladder loss.

## 2022-02-25 ENCOUNTER — OFFICE VISIT (OUTPATIENT)
Dept: FAMILY MEDICINE CLINIC | Facility: CLINIC | Age: 42
End: 2022-02-25
Payer: COMMERCIAL

## 2022-02-25 VITALS
HEART RATE: 88 BPM | OXYGEN SATURATION: 98 % | RESPIRATION RATE: 18 BRPM | DIASTOLIC BLOOD PRESSURE: 90 MMHG | WEIGHT: 207 LBS | SYSTOLIC BLOOD PRESSURE: 134 MMHG | HEIGHT: 65 IN | BODY MASS INDEX: 34.49 KG/M2

## 2022-02-25 DIAGNOSIS — F41.9 ANXIETY: ICD-10-CM

## 2022-02-25 DIAGNOSIS — E66.09 CLASS 1 OBESITY DUE TO EXCESS CALORIES WITHOUT SERIOUS COMORBIDITY WITH BODY MASS INDEX (BMI) OF 34.0 TO 34.9 IN ADULT: Primary | ICD-10-CM

## 2022-02-25 PROBLEM — G20 PARKINSON'S DISEASE (HCC): Status: ACTIVE | Noted: 2022-02-25

## 2022-02-25 PROBLEM — G20.A1 PARKINSON'S DISEASE (HCC): Status: ACTIVE | Noted: 2022-02-25

## 2022-02-25 PROBLEM — G20.A1 PARKINSON'S DISEASE: Status: ACTIVE | Noted: 2022-02-25

## 2022-02-25 PROCEDURE — 3008F BODY MASS INDEX DOCD: CPT | Performed by: PHYSICIAN ASSISTANT

## 2022-02-25 PROCEDURE — 3075F SYST BP GE 130 - 139MM HG: CPT | Performed by: PHYSICIAN ASSISTANT

## 2022-02-25 PROCEDURE — 3080F DIAST BP >= 90 MM HG: CPT | Performed by: PHYSICIAN ASSISTANT

## 2022-02-25 PROCEDURE — 99214 OFFICE O/P EST MOD 30 MIN: CPT | Performed by: PHYSICIAN ASSISTANT

## 2022-02-25 RX ORDER — PHENTERMINE HYDROCHLORIDE 37.5 MG/1
37.5 TABLET ORAL
Qty: 30 TABLET | Refills: 0 | Status: SHIPPED | OUTPATIENT
Start: 2022-02-25 | End: 2022-03-30

## 2022-02-25 RX ORDER — BUPROPION HYDROCHLORIDE 150 MG/1
150 TABLET, EXTENDED RELEASE ORAL 2 TIMES DAILY
Qty: 60 TABLET | Refills: 0 | Status: SHIPPED | OUTPATIENT
Start: 2022-02-25 | End: 2022-03-30

## 2022-02-28 ENCOUNTER — PATIENT MESSAGE (OUTPATIENT)
Dept: FAMILY MEDICINE CLINIC | Facility: CLINIC | Age: 42
End: 2022-02-28

## 2022-02-28 NOTE — TELEPHONE ENCOUNTER
From: Mery Ledezma  To: Jason Nielson PA-C  Sent: 2/28/2022 12:05 AM CST  Subject: Pre-authorization    Hey there. You probably know this already but my insurance won't allow me the phentermine until it's been pre-authorized. Could you let me know when this has been done? I keep calling the pharmacy in hopes to  my meds but they're saying it could be days. :/ Thank you in advance for your help!     Elis Gonzalez

## 2022-02-28 NOTE — TELEPHONE ENCOUNTER
Spoke with [employer] and requested PA paperwork be faxed to the office for completion.

## 2022-03-01 ENCOUNTER — TELEPHONE (OUTPATIENT)
Dept: NEUROLOGY | Facility: CLINIC | Age: 42
End: 2022-03-01

## 2022-03-01 ENCOUNTER — OFFICE VISIT (OUTPATIENT)
Dept: PHYSICAL MEDICINE AND REHAB | Facility: CLINIC | Age: 42
End: 2022-03-01
Payer: COMMERCIAL

## 2022-03-01 VITALS
HEART RATE: 92 BPM | BODY MASS INDEX: 34.49 KG/M2 | DIASTOLIC BLOOD PRESSURE: 74 MMHG | HEIGHT: 65 IN | WEIGHT: 207 LBS | SYSTOLIC BLOOD PRESSURE: 130 MMHG | OXYGEN SATURATION: 98 %

## 2022-03-01 DIAGNOSIS — M51.26 HERNIATED NUCLEUS PULPOSUS, L4-5: Primary | ICD-10-CM

## 2022-03-01 PROCEDURE — 3008F BODY MASS INDEX DOCD: CPT | Performed by: PHYSICAL MEDICINE & REHABILITATION

## 2022-03-01 PROCEDURE — 3078F DIAST BP <80 MM HG: CPT | Performed by: PHYSICAL MEDICINE & REHABILITATION

## 2022-03-01 PROCEDURE — 99244 OFF/OP CNSLTJ NEW/EST MOD 40: CPT | Performed by: PHYSICAL MEDICINE & REHABILITATION

## 2022-03-01 PROCEDURE — 3075F SYST BP GE 130 - 139MM HG: CPT | Performed by: PHYSICAL MEDICINE & REHABILITATION

## 2022-03-01 RX ORDER — MELOXICAM 15 MG/1
TABLET ORAL
Qty: 30 TABLET | Refills: 0 | Status: SHIPPED | OUTPATIENT
Start: 2022-03-01

## 2022-03-01 NOTE — TELEPHONE ENCOUNTER
Acupuncture CPT Codes: 33752 -21 Sessions -pending approval     Clinical notes sent to [facility] for review.

## 2022-03-11 NOTE — TELEPHONE ENCOUNTER
Received in-basket from [facility] with approval for 10 Session for Acupuncture  valid 03/01/22 thru 06/09/22 .      Status: APPROVED    Notified patient via [address]

## 2022-03-19 ENCOUNTER — PATIENT MESSAGE (OUTPATIENT)
Dept: FAMILY MEDICINE CLINIC | Facility: CLINIC | Age: 42
End: 2022-03-19

## 2022-03-21 NOTE — TELEPHONE ENCOUNTER
From: Nas Daly  To: Bharat Schroeder DO  Sent: 3/19/2022 6:53 PM CDT  Subject: Chiropractor referral?    Hi there. I went to the physiatrist who rxd me meds and acupuncture which my insurance won't cover. Unfortunately, this visit did not provide much help. He was very thorough but then only thought of those solutions which was disappointing. Can I please have a referral to see Dr. Alvin Anguiano at [facility]?

## 2022-03-21 NOTE — TELEPHONE ENCOUNTER
Pt saw Dr Thierry Sotelo. It looks like he referred her to pain mgmnt. She would like to know if you will refer to Mitchell Reed. Please advise. Thank you.

## 2022-03-30 ENCOUNTER — OFFICE VISIT (OUTPATIENT)
Dept: FAMILY MEDICINE CLINIC | Facility: CLINIC | Age: 42
End: 2022-03-30
Payer: COMMERCIAL

## 2022-03-30 VITALS
WEIGHT: 198 LBS | OXYGEN SATURATION: 99 % | RESPIRATION RATE: 20 BRPM | BODY MASS INDEX: 37.38 KG/M2 | DIASTOLIC BLOOD PRESSURE: 66 MMHG | HEART RATE: 72 BPM | HEIGHT: 61 IN | TEMPERATURE: 98 F | SYSTOLIC BLOOD PRESSURE: 110 MMHG

## 2022-03-30 DIAGNOSIS — L40.9 PSORIASIS OF SCALP: ICD-10-CM

## 2022-03-30 DIAGNOSIS — G89.29 CHRONIC RADICULAR LUMBAR PAIN: ICD-10-CM

## 2022-03-30 DIAGNOSIS — M54.16 CHRONIC RADICULAR LUMBAR PAIN: ICD-10-CM

## 2022-03-30 DIAGNOSIS — Z99.89 OSA ON CPAP: ICD-10-CM

## 2022-03-30 DIAGNOSIS — G47.33 OSA ON CPAP: ICD-10-CM

## 2022-03-30 DIAGNOSIS — E66.09 CLASS 1 OBESITY DUE TO EXCESS CALORIES WITHOUT SERIOUS COMORBIDITY WITH BODY MASS INDEX (BMI) OF 34.0 TO 34.9 IN ADULT: Primary | ICD-10-CM

## 2022-03-30 DIAGNOSIS — F41.9 ANXIETY: ICD-10-CM

## 2022-03-30 DIAGNOSIS — G47.09 OTHER INSOMNIA: ICD-10-CM

## 2022-03-30 PROCEDURE — 3078F DIAST BP <80 MM HG: CPT | Performed by: PHYSICIAN ASSISTANT

## 2022-03-30 PROCEDURE — 99214 OFFICE O/P EST MOD 30 MIN: CPT | Performed by: PHYSICIAN ASSISTANT

## 2022-03-30 PROCEDURE — 3008F BODY MASS INDEX DOCD: CPT | Performed by: PHYSICIAN ASSISTANT

## 2022-03-30 PROCEDURE — 3074F SYST BP LT 130 MM HG: CPT | Performed by: PHYSICIAN ASSISTANT

## 2022-03-30 RX ORDER — PHENTERMINE HYDROCHLORIDE 37.5 MG/1
37.5 TABLET ORAL
Qty: 30 TABLET | Refills: 2 | Status: SHIPPED | OUTPATIENT
Start: 2022-03-30

## 2022-03-30 RX ORDER — BUPROPION HYDROCHLORIDE 150 MG/1
150 TABLET, EXTENDED RELEASE ORAL 2 TIMES DAILY
Qty: 60 TABLET | Refills: 2 | Status: SHIPPED | OUTPATIENT
Start: 2022-03-30

## 2022-03-30 RX ORDER — HYDROXYZINE 50 MG/1
50 TABLET, FILM COATED ORAL NIGHTLY
Qty: 30 TABLET | Refills: 2 | Status: SHIPPED | OUTPATIENT
Start: 2022-03-30

## 2022-04-06 ENCOUNTER — PATIENT MESSAGE (OUTPATIENT)
Dept: OBGYN CLINIC | Facility: CLINIC | Age: 42
End: 2022-04-06

## 2022-04-06 NOTE — TELEPHONE ENCOUNTER
From: Caralee Dance [place]  To: Sonia Silverman MD  Sent: 4/6/2022 11:08 AM CDT  Subject: Yearly Mammogram     Hi there! Do I need to do a yearly physical and mammogram? I had one scheduled but had to reschedule due to work. Do I need to have a referral for the mammogram? Thanks!     Buster Pages

## 2022-04-18 ENCOUNTER — PATIENT MESSAGE (OUTPATIENT)
Dept: FAMILY MEDICINE CLINIC | Facility: CLINIC | Age: 42
End: 2022-04-18

## 2022-04-18 RX ORDER — HYDROCODONE BITARTRATE AND ACETAMINOPHEN 10; 325 MG/1; MG/1
1 TABLET ORAL EVERY 6 HOURS PRN
Qty: 45 TABLET | Refills: 0 | Status: SHIPPED | OUTPATIENT
Start: 2022-04-18

## 2022-04-18 NOTE — TELEPHONE ENCOUNTER
From: Aj Castillo [facility]  To: Marin Sutherland PA-C  Sent: 4/18/2022 8:49 AM CDT  Subject: More meds? Hey, there. I have been going to the chiropractor, which is helping a lot, so I'm grateful for the referral to him. 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 Holy week wreaked havoc on my back having to set up chairs in the balcony, move music stands, carry all of my music, hymnals, Messiah's, etc. upstairs to the balcony. I am out of what I call my emergency pills (the hydrocodone/acetameniphin). Can I have some more of those to help me get through this rought spot? Thank you!     Leo Opitz

## 2022-04-25 ENCOUNTER — OFFICE VISIT (OUTPATIENT)
Dept: FAMILY MEDICINE CLINIC | Facility: CLINIC | Age: 42
End: 2022-04-25
Payer: COMMERCIAL

## 2022-04-25 VITALS
BODY MASS INDEX: 37.95 KG/M2 | RESPIRATION RATE: 20 BRPM | OXYGEN SATURATION: 98 % | WEIGHT: 201 LBS | HEART RATE: 104 BPM | SYSTOLIC BLOOD PRESSURE: 114 MMHG | DIASTOLIC BLOOD PRESSURE: 84 MMHG | HEIGHT: 61 IN

## 2022-04-25 DIAGNOSIS — M54.42 ACUTE LEFT-SIDED LOW BACK PAIN WITH LEFT-SIDED SCIATICA: Primary | ICD-10-CM

## 2022-04-25 DIAGNOSIS — M51.26 LUMBAR DISC HERNIATION: ICD-10-CM

## 2022-04-25 DIAGNOSIS — M54.50 SEVERE LOW BACK PAIN: ICD-10-CM

## 2022-04-25 PROCEDURE — 99214 OFFICE O/P EST MOD 30 MIN: CPT | Performed by: INTERNAL MEDICINE

## 2022-04-25 PROCEDURE — 3079F DIAST BP 80-89 MM HG: CPT | Performed by: INTERNAL MEDICINE

## 2022-04-25 PROCEDURE — 3008F BODY MASS INDEX DOCD: CPT | Performed by: INTERNAL MEDICINE

## 2022-04-25 PROCEDURE — 3074F SYST BP LT 130 MM HG: CPT | Performed by: INTERNAL MEDICINE

## 2022-04-25 PROCEDURE — 96372 THER/PROPH/DIAG INJ SC/IM: CPT | Performed by: INTERNAL MEDICINE

## 2022-04-25 RX ORDER — PREDNISONE 20 MG/1
TABLET ORAL
Qty: 13 TABLET | Refills: 0 | Status: SHIPPED | OUTPATIENT
Start: 2022-04-25

## 2022-04-25 RX ORDER — KETOROLAC TROMETHAMINE 30 MG/ML
30 INJECTION, SOLUTION INTRAMUSCULAR; INTRAVENOUS ONCE
Status: COMPLETED | OUTPATIENT
Start: 2022-04-25 | End: 2022-04-25

## 2022-04-25 RX ADMIN — KETOROLAC TROMETHAMINE 30 MG: 30 INJECTION, SOLUTION INTRAMUSCULAR; INTRAVENOUS at 15:25:00

## 2022-04-26 ENCOUNTER — TELEPHONE (OUTPATIENT)
Dept: FAMILY MEDICINE CLINIC | Facility: CLINIC | Age: 42
End: 2022-04-26

## 2022-04-26 ENCOUNTER — HOSPITAL ENCOUNTER (OUTPATIENT)
Dept: MRI IMAGING | Age: 42
Discharge: HOME | End: 2022-04-26
Attending: INTERNAL MEDICINE
Payer: COMMERCIAL

## 2022-04-26 DIAGNOSIS — M51.26 LUMBAR DISC HERNIATION: ICD-10-CM

## 2022-04-26 DIAGNOSIS — M54.50 SEVERE LOW BACK PAIN: ICD-10-CM

## 2022-04-26 PROCEDURE — 72148 MRI LUMBAR SPINE W/O DYE: CPT | Performed by: INTERNAL MEDICINE

## 2022-05-12 ENCOUNTER — OFFICE VISIT (OUTPATIENT)
Dept: SURGERY | Facility: CLINIC | Age: 42
End: 2022-05-12
Payer: COMMERCIAL

## 2022-05-12 ENCOUNTER — TELEPHONE (OUTPATIENT)
Dept: SURGERY | Facility: CLINIC | Age: 42
End: 2022-05-12

## 2022-05-12 VITALS
HEART RATE: 100 BPM | HEIGHT: 61 IN | BODY MASS INDEX: 37.38 KG/M2 | DIASTOLIC BLOOD PRESSURE: 80 MMHG | SYSTOLIC BLOOD PRESSURE: 110 MMHG | WEIGHT: 198 LBS

## 2022-05-12 DIAGNOSIS — M47.816 LUMBAR FACET ARTHROPATHY: ICD-10-CM

## 2022-05-12 DIAGNOSIS — M51.36 DDD (DEGENERATIVE DISC DISEASE), LUMBAR: ICD-10-CM

## 2022-05-12 DIAGNOSIS — R20.0 NUMBNESS AND TINGLING: ICD-10-CM

## 2022-05-12 DIAGNOSIS — M51.26 LUMBAR DISC HERNIATION: ICD-10-CM

## 2022-05-12 DIAGNOSIS — M62.81 MUSCLE WEAKNESS: ICD-10-CM

## 2022-05-12 DIAGNOSIS — R20.2 NUMBNESS AND TINGLING: ICD-10-CM

## 2022-05-12 DIAGNOSIS — M54.16 LUMBAR RADICULOPATHY: Primary | ICD-10-CM

## 2022-05-12 PROCEDURE — 3079F DIAST BP 80-89 MM HG: CPT | Performed by: PHYSICIAN ASSISTANT

## 2022-05-12 PROCEDURE — 3008F BODY MASS INDEX DOCD: CPT | Performed by: PHYSICIAN ASSISTANT

## 2022-05-12 PROCEDURE — 3074F SYST BP LT 130 MM HG: CPT | Performed by: PHYSICIAN ASSISTANT

## 2022-05-12 PROCEDURE — 99215 OFFICE O/P EST HI 40 MIN: CPT | Performed by: PHYSICIAN ASSISTANT

## 2022-05-12 NOTE — PROGRESS NOTES
States she having back pain, states she has n/t in her left leg. And in her buttocks. This started a week before easter she was moving things. Cant sit for a long period of time.

## 2022-05-13 ENCOUNTER — TELEMEDICINE (OUTPATIENT)
Dept: PAIN CLINIC | Facility: CLINIC | Age: 42
End: 2022-05-13
Payer: COMMERCIAL

## 2022-05-13 DIAGNOSIS — M54.16 LUMBAR RADICULOPATHY: ICD-10-CM

## 2022-05-13 DIAGNOSIS — M51.26 HNP (HERNIATED NUCLEUS PULPOSUS), LUMBAR: Primary | ICD-10-CM

## 2022-05-13 PROCEDURE — 99213 OFFICE O/P EST LOW 20 MIN: CPT | Performed by: PHYSICIAN ASSISTANT

## 2022-05-14 ENCOUNTER — HOSPITAL ENCOUNTER (OUTPATIENT)
Dept: GENERAL RADIOLOGY | Age: 42
Discharge: HOME | End: 2022-05-14
Attending: PHYSICIAN ASSISTANT
Payer: COMMERCIAL

## 2022-05-14 DIAGNOSIS — M51.36 DDD (DEGENERATIVE DISC DISEASE), LUMBAR: ICD-10-CM

## 2022-05-14 DIAGNOSIS — M51.26 LUMBAR DISC HERNIATION: ICD-10-CM

## 2022-05-14 DIAGNOSIS — M54.16 LUMBAR RADICULOPATHY: ICD-10-CM

## 2022-05-14 DIAGNOSIS — M47.816 LUMBAR FACET ARTHROPATHY: ICD-10-CM

## 2022-05-14 PROCEDURE — 72100 X-RAY EXAM L-S SPINE 2/3 VWS: CPT | Performed by: PHYSICIAN ASSISTANT

## 2022-05-16 NOTE — TELEPHONE ENCOUNTER
Patient is scheduled for LEFT LUMBAR 4 - LUMBAR 5 FRAGMENTECOMY AND MICRODISCECTOMY on 7/11/22 with Dr Vickey Goodell.  form completed  Y Surgery order signed   Marc Godinez on surgery sheet  Y Placed on outlook calendar  Y [employer] message sent to patient with sx instructions  Y Faxed pre-op clearance request to PCP Robert Nicole Faxed pre-op clearance request to [address] [place] [address]  Y E-mail sent to [facility]  Y Post-op appointments made   Y PA NEEDED. Routed to PA team to initiate. Y 3 day follow up reminder placed.

## 2022-05-16 NOTE — TELEPHONE ENCOUNTER
From: Isabela Ledezma  Sent: 5/16/2022 12:51 PM CDT  To: Venita Taylor 2 Nurse  Subject: Surgical instructions    I was able to get in to see my PCP on July 6th at 11:30. Will that be okay with timing? I don't want to mess anything up with this and want to make sure I have all my ducks in a row, come July 11th. Thank you for your help!     Nic Li

## 2022-05-18 ENCOUNTER — PATIENT MESSAGE (OUTPATIENT)
Dept: SURGERY | Facility: CLINIC | Age: 42
End: 2022-05-18

## 2022-05-18 ENCOUNTER — TELEPHONE (OUTPATIENT)
Dept: FAMILY MEDICINE CLINIC | Facility: CLINIC | Age: 42
End: 2022-05-18

## 2022-05-18 NOTE — TELEPHONE ENCOUNTER
From: Harman Ledezma  To: dAa Diaz PA-C  Sent: 5/18/2022 12:43 PM CDT  Subject: Can you help? Hey there. So, I saw Cy Dee Dee on Friday, he approved everything but insurance hasn't approved it yet. Is there a way we can move it along faster? I just talked to someone on the phone who said there was a request on Monday for an update but that nothing has been documented past that. I would love to get some relief soon. I know it may not be up to you, but I thought I would ask just in case. Thank you so much for your help!!!     Malena Martin

## 2022-05-19 ENCOUNTER — OFFICE VISIT (OUTPATIENT)
Dept: HEMATOLOGY/ONCOLOGY | Facility: HOSPITAL | Age: 42
End: 2022-05-19
Attending: INTERNAL MEDICINE
Payer: COMMERCIAL

## 2022-05-19 VITALS
WEIGHT: 200 LBS | BODY MASS INDEX: 37.76 KG/M2 | DIASTOLIC BLOOD PRESSURE: 83 MMHG | HEIGHT: 60.98 IN | RESPIRATION RATE: 16 BRPM | SYSTOLIC BLOOD PRESSURE: 135 MMHG | OXYGEN SATURATION: 95 % | HEART RATE: 77 BPM | TEMPERATURE: 98 F

## 2022-05-19 DIAGNOSIS — E53.8 VITAMIN B12 DEFICIENCY: ICD-10-CM

## 2022-05-19 DIAGNOSIS — R59.1 LYMPHADENOPATHY: ICD-10-CM

## 2022-05-19 DIAGNOSIS — D70.9 NEUTROPENIA, UNSPECIFIED TYPE (HCC): Primary | ICD-10-CM

## 2022-05-19 LAB
ALBUMIN SERPL-MCNC: 3.8 G/DL (ref 3.4–5)
ALBUMIN/GLOB SERPL: 1.1 {RATIO} (ref 1–2)
ALP LIVER SERPL-CCNC: 48 U/L
ALT SERPL-CCNC: 18 U/L
ANION GAP SERPL CALC-SCNC: 7 MMOL/L (ref 0–18)
AST SERPL-CCNC: 14 U/L (ref 15–37)
BASOPHILS # BLD AUTO: 0.04 X10(3) UL (ref 0–0.2)
BASOPHILS NFR BLD AUTO: 0.4 %
BILIRUB SERPL-MCNC: 0.4 MG/DL (ref 0.1–2)
BUN BLD-MCNC: 18 MG/DL (ref 7–18)
CALCIUM BLD-MCNC: 8.7 MG/DL (ref 8.5–10.1)
CHLORIDE SERPL-SCNC: 114 MMOL/L (ref 98–112)
CO2 SERPL-SCNC: 19 MMOL/L (ref 21–32)
CREAT BLD-MCNC: 0.84 MG/DL
CRP SERPL-MCNC: <0.29 MG/DL (ref ?–0.3)
EOSINOPHIL # BLD AUTO: 0.08 X10(3) UL (ref 0–0.7)
EOSINOPHIL NFR BLD AUTO: 0.7 %
ERYTHROCYTE [DISTWIDTH] IN BLOOD BY AUTOMATED COUNT: 12.4 %
GLOBULIN PLAS-MCNC: 3.6 G/DL (ref 2.8–4.4)
GLUCOSE BLD-MCNC: 109 MG/DL (ref 70–99)
HCT VFR BLD AUTO: 42.9 %
HGB BLD-MCNC: 14.7 G/DL
IMM GRANULOCYTES # BLD AUTO: 0.05 X10(3) UL (ref 0–1)
IMM GRANULOCYTES NFR BLD: 0.4 %
LDH SERPL L TO P-CCNC: 197 U/L
LYMPHOCYTES # BLD AUTO: 0.81 X10(3) UL (ref 1–4)
LYMPHOCYTES NFR BLD AUTO: 7.2 %
MCH RBC QN AUTO: 32.7 PG (ref 26–34)
MCHC RBC AUTO-ENTMCNC: 34.3 G/DL (ref 31–37)
MCV RBC AUTO: 95.3 FL
MONOCYTES # BLD AUTO: 0.21 X10(3) UL (ref 0.1–1)
MONOCYTES NFR BLD AUTO: 1.9 %
NEUTROPHILS # BLD AUTO: 9.99 X10 (3) UL (ref 1.5–7.7)
NEUTROPHILS # BLD AUTO: 9.99 X10(3) UL (ref 1.5–7.7)
NEUTROPHILS NFR BLD AUTO: 89.4 %
OSMOLALITY SERPL CALC.SUM OF ELEC: 292 MOSM/KG (ref 275–295)
PLATELET # BLD AUTO: 231 10(3)UL (ref 150–450)
POTASSIUM SERPL-SCNC: 4.3 MMOL/L (ref 3.5–5.1)
PROT SERPL-MCNC: 7.4 G/DL (ref 6.4–8.2)
RBC # BLD AUTO: 4.5 X10(6)UL
SODIUM SERPL-SCNC: 140 MMOL/L (ref 136–145)
VIT B12 SERPL-MCNC: 276 PG/ML (ref 193–986)
WBC # BLD AUTO: 11.2 X10(3) UL (ref 4–11)

## 2022-05-19 PROCEDURE — 99213 OFFICE O/P EST LOW 20 MIN: CPT | Performed by: INTERNAL MEDICINE

## 2022-05-19 NOTE — PROGRESS NOTES
Education Record    Learner:  Patient    Disease / Diagnosis: pancytopenia    Barriers / Limitations:  None   Comments:    Method:  Discussion   Comments:    General Topics:  Plan of care reviewed   Comments:    Outcome:  Shows understanding   Comments:  Pt having surgery in July for microdiscectomy and fragmentectomy. Did take prednisone 20mg today for back pain. Some fatigue.    She does have 2 jobs,

## 2022-05-23 ENCOUNTER — TELEPHONE (OUTPATIENT)
Dept: PAIN CLINIC | Facility: CLINIC | Age: 42
End: 2022-05-23

## 2022-05-23 ENCOUNTER — LAB ENCOUNTER (OUTPATIENT)
Dept: LAB | Facility: HOSPITAL | Age: 42
End: 2022-05-23
Attending: PHYSICIAN ASSISTANT
Payer: COMMERCIAL

## 2022-05-23 DIAGNOSIS — M54.16 LUMBAR RADICULOPATHY: Primary | ICD-10-CM

## 2022-05-23 DIAGNOSIS — M54.16 LUMBAR RADICULOPATHY: ICD-10-CM

## 2022-05-23 NOTE — TELEPHONE ENCOUNTER
Prior authorization request completed for: Left L4-L5, L5-S1 TFESI   Authorization # [id number]  Authorization dates: 5/13/2022 - 8/23/2022  CPT codes approved: 79570 / 20338   Number of visits/dates of service approved: 1  Physician: Dr. Myrtle Ruiz   Location: [facility]     Patient can be scheduled. Routed to Navigator.

## 2022-05-23 NOTE — TELEPHONE ENCOUNTER
Pt called stated she called her insurance company and was advised DENITA Mon has not provided the additional information requested on 5/16. Pt requested DENITA Mon follow up with what is needed. Advised pt we have a PA department that handles authorization matter. DENITA Mon, placed the order and than it goes down the line so to speak for the next steps and so forth. Pt VU. Pt states she is in a lot of pain and I requesting we follow up with [facility] for a status update.     Please advise

## 2022-05-23 NOTE — TELEPHONE ENCOUNTER
Anesthesia Type Sedation   Provider Judye Duverney Location Lab   Procedure Transforaminal   Laterality/Level LEFT L4/5 and L5/S1   Medical clearance requested (will send to Pain Navigator) No   Patient has Medicare coverage?  No

## 2022-05-24 LAB — SARS-COV-2 RNA RESP QL NAA+PROBE: NOT DETECTED

## 2022-05-24 NOTE — TELEPHONE ENCOUNTER
Per hematologist Dr Mariama Christian \"Perioperative Risk assessment: Sreekanth Cheng for surgery from a hematology standpoint. WBC is normal. She will follow up with PCP for medical clearance.  \"    PCP apt scheduled for 7-6-22

## 2022-05-26 ENCOUNTER — HOSPITAL ENCOUNTER (OUTPATIENT)
Facility: HOSPITAL | Age: 42
Setting detail: HOSPITAL OUTPATIENT SURGERY
Discharge: HOME | End: 2022-05-26
Attending: ANESTHESIOLOGY | Admitting: ANESTHESIOLOGY
Payer: COMMERCIAL

## 2022-05-26 ENCOUNTER — APPOINTMENT (OUTPATIENT)
Dept: GENERAL RADIOLOGY | Facility: HOSPITAL | Age: 42
End: 2022-05-26
Attending: ANESTHESIOLOGY
Payer: COMMERCIAL

## 2022-05-26 ENCOUNTER — OFFICE VISIT (OUTPATIENT)
Dept: SURGERY | Facility: CLINIC | Age: 42
End: 2022-05-26
Payer: COMMERCIAL

## 2022-05-26 VITALS
TEMPERATURE: 98 F | SYSTOLIC BLOOD PRESSURE: 127 MMHG | OXYGEN SATURATION: 100 % | DIASTOLIC BLOOD PRESSURE: 63 MMHG | HEART RATE: 88 BPM | RESPIRATION RATE: 16 BRPM

## 2022-05-26 VITALS — DIASTOLIC BLOOD PRESSURE: 92 MMHG | SYSTOLIC BLOOD PRESSURE: 150 MMHG

## 2022-05-26 DIAGNOSIS — M47.816 LUMBAR FACET ARTHROPATHY: ICD-10-CM

## 2022-05-26 DIAGNOSIS — R20.0 NUMBNESS AND TINGLING: ICD-10-CM

## 2022-05-26 DIAGNOSIS — R20.2 NUMBNESS AND TINGLING: ICD-10-CM

## 2022-05-26 DIAGNOSIS — M51.26 LUMBAR DISC HERNIATION: ICD-10-CM

## 2022-05-26 DIAGNOSIS — M54.16 LUMBAR RADICULOPATHY: ICD-10-CM

## 2022-05-26 DIAGNOSIS — M51.36 DDD (DEGENERATIVE DISC DISEASE), LUMBAR: ICD-10-CM

## 2022-05-26 DIAGNOSIS — M54.16 LUMBAR RADICULOPATHY: Primary | ICD-10-CM

## 2022-05-26 LAB — B-HCG UR QL: NEGATIVE

## 2022-05-26 PROCEDURE — 3080F DIAST BP >= 90 MM HG: CPT | Performed by: PHYSICIAN ASSISTANT

## 2022-05-26 PROCEDURE — 64483 NJX AA&/STRD TFRM EPI L/S 1: CPT | Performed by: ANESTHESIOLOGY

## 2022-05-26 PROCEDURE — 3077F SYST BP >= 140 MM HG: CPT | Performed by: PHYSICIAN ASSISTANT

## 2022-05-26 PROCEDURE — 3E0R33Z INTRODUCTION OF ANTI-INFLAMMATORY INTO SPINAL CANAL, PERCUTANEOUS APPROACH: ICD-10-PCS | Performed by: ANESTHESIOLOGY

## 2022-05-26 PROCEDURE — 99212 OFFICE O/P EST SF 10 MIN: CPT | Performed by: PHYSICIAN ASSISTANT

## 2022-05-26 PROCEDURE — 64484 NJX AA&/STRD TFRM EPI L/S EA: CPT | Performed by: ANESTHESIOLOGY

## 2022-05-26 RX ORDER — ONDANSETRON 2 MG/ML
4 INJECTION INTRAMUSCULAR; INTRAVENOUS ONCE AS NEEDED
Status: DISCONTINUED | OUTPATIENT
Start: 2022-05-26 | End: 2022-05-26

## 2022-05-26 RX ORDER — MIDAZOLAM HYDROCHLORIDE 1 MG/ML
INJECTION INTRAMUSCULAR; INTRAVENOUS
Status: DISCONTINUED | OUTPATIENT
Start: 2022-05-26 | End: 2022-05-26

## 2022-05-26 RX ORDER — SODIUM CHLORIDE, SODIUM LACTATE, POTASSIUM CHLORIDE, CALCIUM CHLORIDE 600; 310; 30; 20 MG/100ML; MG/100ML; MG/100ML; MG/100ML
100 INJECTION, SOLUTION INTRAVENOUS CONTINUOUS
Status: DISCONTINUED | OUTPATIENT
Start: 2022-05-26 | End: 2022-05-26

## 2022-05-26 RX ORDER — DEXAMETHASONE SODIUM PHOSPHATE 10 MG/ML
INJECTION, SOLUTION INTRAMUSCULAR; INTRAVENOUS
Status: DISCONTINUED | OUTPATIENT
Start: 2022-05-26 | End: 2022-05-26

## 2022-05-26 RX ORDER — DIPHENHYDRAMINE HYDROCHLORIDE 50 MG/ML
50 INJECTION INTRAMUSCULAR; INTRAVENOUS ONCE AS NEEDED
Status: DISCONTINUED | OUTPATIENT
Start: 2022-05-26 | End: 2022-05-26

## 2022-05-26 RX ORDER — SODIUM CHLORIDE 9 MG/ML
INJECTION INTRAVENOUS
Status: DISCONTINUED | OUTPATIENT
Start: 2022-05-26 | End: 2022-05-26

## 2022-05-26 RX ORDER — GABAPENTIN 300 MG/1
300 CAPSULE ORAL 3 TIMES DAILY
Qty: 90 CAPSULE | Refills: 1 | Status: SHIPPED | OUTPATIENT
Start: 2022-05-26

## 2022-05-26 RX ORDER — HYDROXYZINE 50 MG/1
TABLET, FILM COATED ORAL
COMMUNITY
Start: 2022-05-24

## 2022-05-26 RX ORDER — LIDOCAINE HYDROCHLORIDE 10 MG/ML
INJECTION, SOLUTION EPIDURAL; INFILTRATION; INTRACAUDAL; PERINEURAL
Status: DISCONTINUED | OUTPATIENT
Start: 2022-05-26 | End: 2022-05-26

## 2022-05-26 NOTE — OPERATIVE REPORT
[facility]  Operative Report  2022     [address] [facility] Patient Status:  Hospital Outpatient Surgery    10/14/1980 MRN [id number]   Location [id number] [place] [address] Attending Rachel Cisneros MD   [facility] Day # 0 PCP Avel Perry DO     Indication: Lucrecia Orozco is a 39year old female with lumbar radiculopathy    Preoperative Diagnosis:  Lumbar radiculopathy [M54.16]    Postoperative Diagnosis: Same as above. Procedure performed: LEFT LUMBAR 4-5, LUMBAR 5-SACRAL 1 TRANSFORAMINAL LUMBAR EPIDURAL STEROID INJECTION MULTIPLE LEVEL with sedation     Anesthesia: Local and IV Sedation. EBL: Less than 1 ml. Procedure Description:  After reviewing the patient's history and performing a focused physical examination, the diagnosis was confirmed and contraindications such as infection and coagulopathy were ruled out. Following review of potential side effects and complications, including but not necessarily limited to infection, allergic reaction, local tissue breakdown, nerve injury, and paresis, the patient indicated they understood and agreed to proceed. After obtaining the informed consent, the patient was brought to the procedure room and monitored. Per my order and under my supervision, the patient was moderately sedated with intermittent intravenous doses of versed and fentanyl. The vital signs including continuous pulse oximetry and cardiac monitoring were monitored and recorded by an experienced R.N. The procedure started after the patient was adequately sedated. Total time  for sedation was 7 minutes. In the prone position, following sterile prep and drape of the lumbar region,  the  L4 neural foramen was identified under fluoroscopy. The skin and subcutaneous tissue was anesthetized via 25-gauge 1.5\" needle with approximately 2 cc of 1% lidocaine.   A 22-gauge 5\" Quincke spinal needle was introduced toward the inferior aspect of the junction between the transverse process and pedicle of the  L4 level atraumatically under fluoroscopic guidance. The needle was advanced into the anterior epidural space at this level. The needle position was confirmed under AP and lateral fluoroscopic view. Following negative aspiration for CSF and blood, approximately 1 cc of Omnipaque 240 was injected. An excellent contrast spread along the epidural space and the nerve root was obtained. At this point, 1CC OF ns with 5 mg of dexamethasone was injected without complication. The needle was withdrawn with stylet in situ after being flushed with 1 cc PF lidocaine. The  l5 neural foramen was also identified under fluoroscopy. The skin and subcutaneous tissue was anesthetized via 25-gauge 1.5\" needle with approximately 2 cc of 1% lidocaine. A 22-gauge 5\" Quincke spinal needle was introduced toward the inferior aspect of the junction between the transverse process and pedicle of the l5 level atraumatically under fluoroscopic guidance. The needle was advanced into the anterior epidural space at this level. The needle position was confirmed under AP and lateral fluoroscopic view. Following negative aspiration for CSF and blood, approximately 1 cc of Omnipaque 240 was injected. An excellent contrast spread along the epidural space and the nerve root was obtained. At this point, 1cc of NS with 5 mg of dexamethasone was injected without complication. The needle was withdrawn with stylet in situ after being flushed with 1 cc PF lidocaine. .  The patient tolerated procedure very well. The patient was observed until discharge criteria met. Discharge instructions were given and patient was released to a responsible adult. Complications: None. Follow up: The patient was followed in the pain clinic as needed basis.         Ana Kwon MD

## 2022-05-26 NOTE — PROGRESS NOTES
States she see Dr. Meridee Cushing today for injections states she walking and still  Is when she in a lot of pain.

## 2022-05-30 ENCOUNTER — PATIENT MESSAGE (OUTPATIENT)
Dept: SURGERY | Facility: CLINIC | Age: 42
End: 2022-05-30

## 2022-05-31 NOTE — TELEPHONE ENCOUNTER
Pt calling back to follow up from [facility]. Pt states she is in extreme pain and would like to know if surgery can be moved up. Please advise.

## 2022-06-01 NOTE — TELEPHONE ENCOUNTER
Pt calling again this morning asking for earlier surgery date(see [facility] pt message from 5/30/2022).

## 2022-06-02 NOTE — TELEPHONE ENCOUNTER
Called to reschedule surgery      Pt's sx date and time have changed to 6/20/22 as 2nd case    Y-Place case change request   Y-Document new sx date in [facility] date/time  Y-Update sx scheduling sheet  Y-Update Prior auth team  Y-Update flag in nursing pool  [place] pt to inform of sx date/time change  N- Post op appts changed  Y- Sent email to [facility] informing of time/date change  NA-Update Analy and Robert      Pt hoping to try to move up to 6/13/22 would like to hold off on rescheduling post-op appts until next week to see if she is moved up. Informed we can check PA status and move up if authorized.

## 2022-06-02 NOTE — TELEPHONE ENCOUNTER
Pt calling again to find out if there is any way to move her 7.11.22 surgery up? She states she is in extreme pain and would like a call back as soon as possible please.

## 2022-06-06 ENCOUNTER — LAB ENCOUNTER (OUTPATIENT)
Dept: LAB | Age: 42
End: 2022-06-06
Attending: FAMILY MEDICINE
Payer: COMMERCIAL

## 2022-06-06 ENCOUNTER — OFFICE VISIT (OUTPATIENT)
Dept: FAMILY MEDICINE CLINIC | Facility: CLINIC | Age: 42
End: 2022-06-06
Payer: COMMERCIAL

## 2022-06-06 VITALS
BODY MASS INDEX: 33.66 KG/M2 | HEIGHT: 65 IN | WEIGHT: 202 LBS | DIASTOLIC BLOOD PRESSURE: 80 MMHG | RESPIRATION RATE: 18 BRPM | SYSTOLIC BLOOD PRESSURE: 124 MMHG | HEART RATE: 78 BPM | OXYGEN SATURATION: 98 %

## 2022-06-06 DIAGNOSIS — M47.816 LUMBAR FACET ARTHROPATHY: ICD-10-CM

## 2022-06-06 DIAGNOSIS — G47.33 OSA ON CPAP: ICD-10-CM

## 2022-06-06 DIAGNOSIS — M51.26 LUMBAR DISC HERNIATION: ICD-10-CM

## 2022-06-06 DIAGNOSIS — F41.9 ANXIETY: ICD-10-CM

## 2022-06-06 DIAGNOSIS — Z01.810 PREOP CARDIOVASCULAR EXAM: Primary | ICD-10-CM

## 2022-06-06 DIAGNOSIS — D70.9 NEUTROPENIA, UNSPECIFIED TYPE (HCC): ICD-10-CM

## 2022-06-06 DIAGNOSIS — M51.36 DEGENERATION OF LUMBAR INTERVERTEBRAL DISC: ICD-10-CM

## 2022-06-06 DIAGNOSIS — Z99.89 OSA ON CPAP: ICD-10-CM

## 2022-06-06 DIAGNOSIS — Z01.810 PREOP CARDIOVASCULAR EXAM: ICD-10-CM

## 2022-06-06 DIAGNOSIS — M54.16 LUMBAR RADICULOPATHY: ICD-10-CM

## 2022-06-06 PROBLEM — G20 PARKINSON'S DISEASE (HCC): Status: RESOLVED | Noted: 2022-02-25 | Resolved: 2022-06-06

## 2022-06-06 PROBLEM — G20.A1 PARKINSON'S DISEASE (HCC): Status: RESOLVED | Noted: 2022-02-25 | Resolved: 2022-06-06

## 2022-06-06 PROBLEM — Z30.011 INITIATION OF OCP (BCP): Status: RESOLVED | Noted: 2020-07-13 | Resolved: 2022-06-06

## 2022-06-06 PROBLEM — G20.A1 PARKINSON'S DISEASE: Status: RESOLVED | Noted: 2022-02-25 | Resolved: 2022-06-06

## 2022-06-06 LAB
ALBUMIN SERPL-MCNC: 4 G/DL (ref 3.4–5)
ALBUMIN/GLOB SERPL: 1.2 {RATIO} (ref 1–2)
ALP LIVER SERPL-CCNC: 48 U/L
ALT SERPL-CCNC: 22 U/L
ANION GAP SERPL CALC-SCNC: 5 MMOL/L (ref 0–18)
ANTIBODY SCREEN: NEGATIVE
APTT PPP: 26.1 SECONDS (ref 23.3–35.6)
AST SERPL-CCNC: 16 U/L (ref 15–37)
BASOPHILS # BLD AUTO: 0.07 X10(3) UL (ref 0–0.2)
BASOPHILS NFR BLD AUTO: 0.5 %
BILIRUB SERPL-MCNC: 0.6 MG/DL (ref 0.1–2)
BUN BLD-MCNC: 10 MG/DL (ref 7–18)
CALCIUM BLD-MCNC: 9.5 MG/DL (ref 8.5–10.1)
CHLORIDE SERPL-SCNC: 110 MMOL/L (ref 98–112)
CO2 SERPL-SCNC: 24 MMOL/L (ref 21–32)
CREAT BLD-MCNC: 0.85 MG/DL
EOSINOPHIL # BLD AUTO: 0.06 X10(3) UL (ref 0–0.7)
EOSINOPHIL NFR BLD AUTO: 0.5 %
ERYTHROCYTE [DISTWIDTH] IN BLOOD BY AUTOMATED COUNT: 12 %
FASTING STATUS PATIENT QL REPORTED: YES
GLOBULIN PLAS-MCNC: 3.4 G/DL (ref 2.8–4.4)
GLUCOSE BLD-MCNC: 96 MG/DL (ref 70–99)
HCT VFR BLD AUTO: 44.1 %
HGB BLD-MCNC: 14.8 G/DL
IMM GRANULOCYTES # BLD AUTO: 0.05 X10(3) UL (ref 0–1)
IMM GRANULOCYTES NFR BLD: 0.4 %
INR BLD: 0.95 (ref 0.8–1.2)
LYMPHOCYTES # BLD AUTO: 1.38 X10(3) UL (ref 1–4)
LYMPHOCYTES NFR BLD AUTO: 10.4 %
MCH RBC QN AUTO: 32.5 PG (ref 26–34)
MCHC RBC AUTO-ENTMCNC: 33.6 G/DL (ref 31–37)
MCV RBC AUTO: 96.9 FL
MONOCYTES # BLD AUTO: 0.5 X10(3) UL (ref 0.1–1)
MONOCYTES NFR BLD AUTO: 3.8 %
NEUTROPHILS # BLD AUTO: 11.23 X10 (3) UL (ref 1.5–7.7)
NEUTROPHILS # BLD AUTO: 11.23 X10(3) UL (ref 1.5–7.7)
NEUTROPHILS NFR BLD AUTO: 84.4 %
OSMOLALITY SERPL CALC.SUM OF ELEC: 287 MOSM/KG (ref 275–295)
PLATELET # BLD AUTO: 255 10(3)UL (ref 150–450)
POTASSIUM SERPL-SCNC: 4.3 MMOL/L (ref 3.5–5.1)
PROT SERPL-MCNC: 7.4 G/DL (ref 6.4–8.2)
PROTHROMBIN TIME: 12.7 SECONDS (ref 11.6–14.8)
RBC # BLD AUTO: 4.55 X10(6)UL
RH BLOOD TYPE: POSITIVE
SODIUM SERPL-SCNC: 139 MMOL/L (ref 136–145)
WBC # BLD AUTO: 13.3 X10(3) UL (ref 4–11)

## 2022-06-06 PROCEDURE — 85025 COMPLETE CBC W/AUTO DIFF WBC: CPT

## 2022-06-06 PROCEDURE — 87081 CULTURE SCREEN ONLY: CPT

## 2022-06-06 PROCEDURE — 86850 RBC ANTIBODY SCREEN: CPT

## 2022-06-06 PROCEDURE — 86900 BLOOD TYPING SEROLOGIC ABO: CPT

## 2022-06-06 PROCEDURE — 36415 COLL VENOUS BLD VENIPUNCTURE: CPT

## 2022-06-06 PROCEDURE — 85610 PROTHROMBIN TIME: CPT

## 2022-06-06 PROCEDURE — 80053 COMPREHEN METABOLIC PANEL: CPT

## 2022-06-06 PROCEDURE — 85730 THROMBOPLASTIN TIME PARTIAL: CPT

## 2022-06-06 PROCEDURE — 86901 BLOOD TYPING SEROLOGIC RH(D): CPT

## 2022-06-08 ENCOUNTER — HOSPITAL ENCOUNTER (OUTPATIENT)
Dept: ULTRASOUND IMAGING | Age: 42
Discharge: HOME | End: 2022-06-08
Attending: INTERNAL MEDICINE
Payer: COMMERCIAL

## 2022-06-08 DIAGNOSIS — R59.1 LYMPHADENOPATHY: ICD-10-CM

## 2022-06-08 PROCEDURE — 76700 US EXAM ABDOM COMPLETE: CPT | Performed by: INTERNAL MEDICINE

## 2022-06-09 ENCOUNTER — OFFICE VISIT (OUTPATIENT)
Dept: PAIN CLINIC | Facility: CLINIC | Age: 42
End: 2022-06-09
Payer: COMMERCIAL

## 2022-06-09 ENCOUNTER — PATIENT MESSAGE (OUTPATIENT)
Dept: SURGERY | Facility: CLINIC | Age: 42
End: 2022-06-09

## 2022-06-09 VITALS
HEART RATE: 82 BPM | DIASTOLIC BLOOD PRESSURE: 78 MMHG | WEIGHT: 202 LBS | OXYGEN SATURATION: 98 % | BODY MASS INDEX: 34 KG/M2 | SYSTOLIC BLOOD PRESSURE: 112 MMHG

## 2022-06-09 DIAGNOSIS — M51.26 HNP (HERNIATED NUCLEUS PULPOSUS), LUMBAR: ICD-10-CM

## 2022-06-09 DIAGNOSIS — M54.16 LUMBAR RADICULOPATHY: Primary | ICD-10-CM

## 2022-06-09 PROCEDURE — 3074F SYST BP LT 130 MM HG: CPT | Performed by: PHYSICIAN ASSISTANT

## 2022-06-09 PROCEDURE — 3078F DIAST BP <80 MM HG: CPT | Performed by: PHYSICIAN ASSISTANT

## 2022-06-09 PROCEDURE — 99214 OFFICE O/P EST MOD 30 MIN: CPT | Performed by: PHYSICIAN ASSISTANT

## 2022-06-09 NOTE — TELEPHONE ENCOUNTER
Received a call from patient who discussed if surgery date was confirmed to be on 6/20 as scheduled, or if she may undergo surgery on 6/13. Informed patient that insurance carrier has not yet approved procedure to be covered, and a 6/20 surgery will most likely provide ample time to obtain insurance approval. Patient voiced understanding, discussed timing of Covid test, and the call was ended.

## 2022-06-09 NOTE — PROGRESS NOTES
Last procedure: LEFT LUMBAR 4-5, LUMBAR 5-SACRAL 1 TRANSFORAMINAL LUMBAR EPIDURAL STEROID INJECTION MULTIPLE LEVEL with sedation  Date: 05/26/22  Percentage of relief obtained: 10-20%  Duration of relief: relief begun just 3 days ago    Current Pain Score: 7

## 2022-06-09 NOTE — TELEPHONE ENCOUNTER
Received a call from patient who discussed if surgery date was confirmed to be on 6/20 as scheduled, or if she may undergo surgery on 6/13. Informed patient that insurance carrier has not yet approved procedure to be covered, and a 6/20 surgery may provide ample time to obtain insurance approval. Patient voiced understanding, discussed timing of Covid test, and the call was ended.

## 2022-06-09 NOTE — TELEPHONE ENCOUNTER
From: Jakob Ledezma  To: Yifan Mitchell PA-C  Sent: 5/18/2022 12:43 PM CDT  Subject: Can you help? Hey there. So, I saw Jarrell Alexandra on Friday, he approved everything but insurance hasn't approved it yet. Is there a way we can move it along faster? I just talked to someone on the phone who said there was a request on Monday for an update but that nothing has been documented past that. I would love to get some relief soon. I know it may not be up to you, but I thought I would ask just in case. Thank you so much for your help!!!     Sammie Mobley

## 2022-06-18 ENCOUNTER — LAB ENCOUNTER (OUTPATIENT)
Dept: LAB | Facility: HOSPITAL | Age: 42
End: 2022-06-18
Attending: NEUROLOGICAL SURGERY
Payer: COMMERCIAL

## 2022-06-18 DIAGNOSIS — M54.16 LUMBAR RADICULOPATHY: ICD-10-CM

## 2022-06-18 LAB — SARS-COV-2 RNA RESP QL NAA+PROBE: NOT DETECTED

## 2022-06-20 ENCOUNTER — ANESTHESIA EVENT (OUTPATIENT)
Dept: SURGERY | Facility: HOSPITAL | Age: 42
End: 2022-06-20
Payer: COMMERCIAL

## 2022-06-20 ENCOUNTER — ANESTHESIA (OUTPATIENT)
Dept: SURGERY | Facility: HOSPITAL | Age: 42
End: 2022-06-20
Payer: COMMERCIAL

## 2022-06-20 ENCOUNTER — APPOINTMENT (OUTPATIENT)
Dept: GENERAL RADIOLOGY | Facility: HOSPITAL | Age: 42
End: 2022-06-20
Attending: NEUROLOGICAL SURGERY
Payer: COMMERCIAL

## 2022-06-20 ENCOUNTER — HOSPITAL ENCOUNTER (OUTPATIENT)
Facility: HOSPITAL | Age: 42
Discharge: HOME | End: 2022-06-22
Attending: NEUROLOGICAL SURGERY | Admitting: NEUROLOGICAL SURGERY
Payer: COMMERCIAL

## 2022-06-20 DIAGNOSIS — M51.36 DDD (DEGENERATIVE DISC DISEASE), LUMBAR: ICD-10-CM

## 2022-06-20 DIAGNOSIS — M54.16 LUMBAR RADICULOPATHY: Primary | ICD-10-CM

## 2022-06-20 DIAGNOSIS — M62.81 MUSCLE WEAKNESS: ICD-10-CM

## 2022-06-20 DIAGNOSIS — R20.0 NUMBNESS AND TINGLING: ICD-10-CM

## 2022-06-20 DIAGNOSIS — R20.2 NUMBNESS AND TINGLING: ICD-10-CM

## 2022-06-20 DIAGNOSIS — M51.26 LUMBAR DISC HERNIATION: ICD-10-CM

## 2022-06-20 LAB — B-HCG UR QL: NEGATIVE

## 2022-06-20 PROCEDURE — 81025 URINE PREGNANCY TEST: CPT

## 2022-06-20 PROCEDURE — 76000 FLUOROSCOPY <1 HR PHYS/QHP: CPT | Performed by: NEUROLOGICAL SURGERY

## 2022-06-20 PROCEDURE — 0SB20ZZ EXCISION OF LUMBAR VERTEBRAL DISC, OPEN APPROACH: ICD-10-PCS | Performed by: NEUROLOGICAL SURGERY

## 2022-06-20 RX ORDER — MORPHINE SULFATE 4 MG/ML
4 INJECTION, SOLUTION INTRAMUSCULAR; INTRAVENOUS EVERY 2 HOUR PRN
Status: DISCONTINUED | OUTPATIENT
Start: 2022-06-20 | End: 2022-06-22

## 2022-06-20 RX ORDER — LIDOCAINE HYDROCHLORIDE 10 MG/ML
INJECTION, SOLUTION EPIDURAL; INFILTRATION; INTRACAUDAL; PERINEURAL AS NEEDED
Status: DISCONTINUED | OUTPATIENT
Start: 2022-06-20 | End: 2022-06-20 | Stop reason: SURG

## 2022-06-20 RX ORDER — HYDROMORPHONE HYDROCHLORIDE 1 MG/ML
0.4 INJECTION, SOLUTION INTRAMUSCULAR; INTRAVENOUS; SUBCUTANEOUS EVERY 5 MIN PRN
Status: DISCONTINUED | OUTPATIENT
Start: 2022-06-20 | End: 2022-06-20 | Stop reason: HOSPADM

## 2022-06-20 RX ORDER — NEOSTIGMINE METHYLSULFATE 1 MG/ML
INJECTION INTRAVENOUS AS NEEDED
Status: DISCONTINUED | OUTPATIENT
Start: 2022-06-20 | End: 2022-06-20 | Stop reason: SURG

## 2022-06-20 RX ORDER — MEPERIDINE HYDROCHLORIDE 25 MG/ML
12.5 INJECTION INTRAMUSCULAR; INTRAVENOUS; SUBCUTANEOUS AS NEEDED
Status: DISCONTINUED | OUTPATIENT
Start: 2022-06-20 | End: 2022-06-20 | Stop reason: HOSPADM

## 2022-06-20 RX ORDER — OXYCODONE HYDROCHLORIDE 10 MG/1
10 TABLET ORAL EVERY 4 HOURS PRN
Status: DISCONTINUED | OUTPATIENT
Start: 2022-06-20 | End: 2022-06-21

## 2022-06-20 RX ORDER — HYDROXYZINE HYDROCHLORIDE 25 MG/1
50 TABLET, FILM COATED ORAL NIGHTLY PRN
Status: DISCONTINUED | OUTPATIENT
Start: 2022-06-20 | End: 2022-06-22

## 2022-06-20 RX ORDER — DEXAMETHASONE SODIUM PHOSPHATE 4 MG/ML
VIAL (ML) INJECTION AS NEEDED
Status: DISCONTINUED | OUTPATIENT
Start: 2022-06-20 | End: 2022-06-20 | Stop reason: SURG

## 2022-06-20 RX ORDER — ONDANSETRON 2 MG/ML
INJECTION INTRAMUSCULAR; INTRAVENOUS AS NEEDED
Status: DISCONTINUED | OUTPATIENT
Start: 2022-06-20 | End: 2022-06-20 | Stop reason: SURG

## 2022-06-20 RX ORDER — SODIUM CHLORIDE AND POTASSIUM CHLORIDE .9; .15 G/100ML; G/100ML
SOLUTION INTRAVENOUS CONTINUOUS
Status: DISCONTINUED | OUTPATIENT
Start: 2022-06-20 | End: 2022-06-22

## 2022-06-20 RX ORDER — PRAMIPEXOLE DIHYDROCHLORIDE 1 MG/1
1 TABLET ORAL EVERY EVENING
Status: DISCONTINUED | OUTPATIENT
Start: 2022-06-20 | End: 2022-06-22

## 2022-06-20 RX ORDER — HYDROCODONE BITARTRATE AND ACETAMINOPHEN 5; 325 MG/1; MG/1
1 TABLET ORAL ONCE AS NEEDED
Status: DISCONTINUED | OUTPATIENT
Start: 2022-06-20 | End: 2022-06-20 | Stop reason: HOSPADM

## 2022-06-20 RX ORDER — SODIUM CHLORIDE, SODIUM LACTATE, POTASSIUM CHLORIDE, CALCIUM CHLORIDE 600; 310; 30; 20 MG/100ML; MG/100ML; MG/100ML; MG/100ML
INJECTION, SOLUTION INTRAVENOUS CONTINUOUS
Status: DISCONTINUED | OUTPATIENT
Start: 2022-06-20 | End: 2022-06-20

## 2022-06-20 RX ORDER — ROCURONIUM BROMIDE 10 MG/ML
INJECTION, SOLUTION INTRAVENOUS AS NEEDED
Status: DISCONTINUED | OUTPATIENT
Start: 2022-06-20 | End: 2022-06-20 | Stop reason: SURG

## 2022-06-20 RX ORDER — LABETALOL HYDROCHLORIDE 5 MG/ML
5 INJECTION, SOLUTION INTRAVENOUS EVERY 5 MIN PRN
Status: DISCONTINUED | OUTPATIENT
Start: 2022-06-20 | End: 2022-06-20 | Stop reason: HOSPADM

## 2022-06-20 RX ORDER — BISACODYL 10 MG
10 SUPPOSITORY, RECTAL RECTAL
Status: DISCONTINUED | OUTPATIENT
Start: 2022-06-20 | End: 2022-06-22

## 2022-06-20 RX ORDER — GLYCOPYRROLATE 0.2 MG/ML
INJECTION, SOLUTION INTRAMUSCULAR; INTRAVENOUS AS NEEDED
Status: DISCONTINUED | OUTPATIENT
Start: 2022-06-20 | End: 2022-06-20 | Stop reason: SURG

## 2022-06-20 RX ORDER — VANCOMYCIN HYDROCHLORIDE
15 ONCE
Status: COMPLETED | OUTPATIENT
Start: 2022-06-20 | End: 2022-06-21

## 2022-06-20 RX ORDER — PROCHLORPERAZINE EDISYLATE 5 MG/ML
5 INJECTION INTRAMUSCULAR; INTRAVENOUS EVERY 8 HOURS PRN
Status: DISCONTINUED | OUTPATIENT
Start: 2022-06-20 | End: 2022-06-20 | Stop reason: HOSPADM

## 2022-06-20 RX ORDER — DOCUSATE SODIUM 100 MG/1
100 CAPSULE, LIQUID FILLED ORAL 2 TIMES DAILY
Status: DISCONTINUED | OUTPATIENT
Start: 2022-06-20 | End: 2022-06-22

## 2022-06-20 RX ORDER — SENNOSIDES 8.6 MG
17.2 TABLET ORAL NIGHTLY
Status: DISCONTINUED | OUTPATIENT
Start: 2022-06-20 | End: 2022-06-22

## 2022-06-20 RX ORDER — HYDROMORPHONE HYDROCHLORIDE 1 MG/ML
0.2 INJECTION, SOLUTION INTRAMUSCULAR; INTRAVENOUS; SUBCUTANEOUS EVERY 5 MIN PRN
Status: DISCONTINUED | OUTPATIENT
Start: 2022-06-20 | End: 2022-06-20 | Stop reason: HOSPADM

## 2022-06-20 RX ORDER — BUPIVACAINE HYDROCHLORIDE AND EPINEPHRINE 2.5; 5 MG/ML; UG/ML
INJECTION, SOLUTION EPIDURAL; INFILTRATION; INTRACAUDAL; PERINEURAL AS NEEDED
Status: DISCONTINUED | OUTPATIENT
Start: 2022-06-20 | End: 2022-06-20 | Stop reason: HOSPADM

## 2022-06-20 RX ORDER — ACETAMINOPHEN 500 MG
1000 TABLET ORAL ONCE AS NEEDED
Status: DISCONTINUED | OUTPATIENT
Start: 2022-06-20 | End: 2022-06-20 | Stop reason: HOSPADM

## 2022-06-20 RX ORDER — OXYCODONE HYDROCHLORIDE 5 MG/1
5 TABLET ORAL EVERY 4 HOURS PRN
Status: DISCONTINUED | OUTPATIENT
Start: 2022-06-20 | End: 2022-06-21

## 2022-06-20 RX ORDER — SODIUM CHLORIDE, SODIUM LACTATE, POTASSIUM CHLORIDE, CALCIUM CHLORIDE 600; 310; 30; 20 MG/100ML; MG/100ML; MG/100ML; MG/100ML
INJECTION, SOLUTION INTRAVENOUS CONTINUOUS
Status: DISCONTINUED | OUTPATIENT
Start: 2022-06-20 | End: 2022-06-20 | Stop reason: HOSPADM

## 2022-06-20 RX ORDER — MIDAZOLAM HYDROCHLORIDE 1 MG/ML
INJECTION INTRAMUSCULAR; INTRAVENOUS AS NEEDED
Status: DISCONTINUED | OUTPATIENT
Start: 2022-06-20 | End: 2022-06-20 | Stop reason: SURG

## 2022-06-20 RX ORDER — DIPHENHYDRAMINE HYDROCHLORIDE 50 MG/ML
25 INJECTION INTRAMUSCULAR; INTRAVENOUS EVERY 4 HOURS PRN
Status: DISCONTINUED | OUTPATIENT
Start: 2022-06-20 | End: 2022-06-22

## 2022-06-20 RX ORDER — SODIUM PHOSPHATE, DIBASIC AND SODIUM PHOSPHATE, MONOBASIC 7; 19 G/133ML; G/133ML
1 ENEMA RECTAL ONCE AS NEEDED
Status: DISCONTINUED | OUTPATIENT
Start: 2022-06-20 | End: 2022-06-22

## 2022-06-20 RX ORDER — ONDANSETRON 2 MG/ML
4 INJECTION INTRAMUSCULAR; INTRAVENOUS EVERY 4 HOURS PRN
Status: ACTIVE | OUTPATIENT
Start: 2022-06-20 | End: 2022-06-21

## 2022-06-20 RX ORDER — CYCLOBENZAPRINE HCL 5 MG
5 TABLET ORAL EVERY 6 HOURS PRN
Status: DISCONTINUED | OUTPATIENT
Start: 2022-06-20 | End: 2022-06-22

## 2022-06-20 RX ORDER — MORPHINE SULFATE 2 MG/ML
2 INJECTION, SOLUTION INTRAMUSCULAR; INTRAVENOUS EVERY 2 HOUR PRN
Status: DISCONTINUED | OUTPATIENT
Start: 2022-06-20 | End: 2022-06-22

## 2022-06-20 RX ORDER — HYDROMORPHONE HYDROCHLORIDE 1 MG/ML
0.6 INJECTION, SOLUTION INTRAMUSCULAR; INTRAVENOUS; SUBCUTANEOUS EVERY 5 MIN PRN
Status: DISCONTINUED | OUTPATIENT
Start: 2022-06-20 | End: 2022-06-20 | Stop reason: HOSPADM

## 2022-06-20 RX ORDER — PROCHLORPERAZINE EDISYLATE 5 MG/ML
10 INJECTION INTRAMUSCULAR; INTRAVENOUS EVERY 6 HOURS PRN
Status: DISCONTINUED | OUTPATIENT
Start: 2022-06-20 | End: 2022-06-22

## 2022-06-20 RX ORDER — HYDROCODONE BITARTRATE AND ACETAMINOPHEN 5; 325 MG/1; MG/1
2 TABLET ORAL ONCE AS NEEDED
Status: DISCONTINUED | OUTPATIENT
Start: 2022-06-20 | End: 2022-06-20 | Stop reason: HOSPADM

## 2022-06-20 RX ORDER — BUPROPION HYDROCHLORIDE 150 MG/1
150 TABLET, EXTENDED RELEASE ORAL 2 TIMES DAILY
Status: DISCONTINUED | OUTPATIENT
Start: 2022-06-20 | End: 2022-06-22

## 2022-06-20 RX ORDER — METHYLPREDNISOLONE ACETATE 80 MG/ML
INJECTION, SUSPENSION INTRA-ARTICULAR; INTRALESIONAL; INTRAMUSCULAR; SOFT TISSUE AS NEEDED
Status: DISCONTINUED | OUTPATIENT
Start: 2022-06-20 | End: 2022-06-20 | Stop reason: HOSPADM

## 2022-06-20 RX ORDER — HYDROMORPHONE HYDROCHLORIDE 1 MG/ML
INJECTION, SOLUTION INTRAMUSCULAR; INTRAVENOUS; SUBCUTANEOUS
Status: COMPLETED
Start: 2022-06-20 | End: 2022-06-20

## 2022-06-20 RX ORDER — ACETAMINOPHEN 325 MG/1
650 TABLET ORAL EVERY 4 HOURS PRN
Status: DISCONTINUED | OUTPATIENT
Start: 2022-06-20 | End: 2022-06-22

## 2022-06-20 RX ORDER — VANCOMYCIN HYDROCHLORIDE
15 ONCE
Status: DISCONTINUED | OUTPATIENT
Start: 2022-06-20 | End: 2022-06-20 | Stop reason: HOSPADM

## 2022-06-20 RX ORDER — ONDANSETRON 2 MG/ML
4 INJECTION INTRAMUSCULAR; INTRAVENOUS EVERY 6 HOURS PRN
Status: DISCONTINUED | OUTPATIENT
Start: 2022-06-20 | End: 2022-06-20 | Stop reason: HOSPADM

## 2022-06-20 RX ORDER — MORPHINE SULFATE 2 MG/ML
1 INJECTION, SOLUTION INTRAMUSCULAR; INTRAVENOUS EVERY 2 HOUR PRN
Status: DISCONTINUED | OUTPATIENT
Start: 2022-06-20 | End: 2022-06-22

## 2022-06-20 RX ORDER — SCOLOPAMINE TRANSDERMAL SYSTEM 1 MG/1
1 PATCH, EXTENDED RELEASE TRANSDERMAL ONCE
Status: DISCONTINUED | OUTPATIENT
Start: 2022-06-20 | End: 2022-06-20 | Stop reason: HOSPADM

## 2022-06-20 RX ORDER — DIPHENHYDRAMINE HYDROCHLORIDE 50 MG/ML
12.5 INJECTION INTRAMUSCULAR; INTRAVENOUS AS NEEDED
Status: DISCONTINUED | OUTPATIENT
Start: 2022-06-20 | End: 2022-06-20 | Stop reason: HOSPADM

## 2022-06-20 RX ORDER — GABAPENTIN 300 MG/1
600 CAPSULE ORAL NIGHTLY
COMMUNITY

## 2022-06-20 RX ORDER — MORPHINE SULFATE 0.5 MG/ML
INJECTION, SOLUTION EPIDURAL; INTRATHECAL; INTRAVENOUS AS NEEDED
Status: DISCONTINUED | OUTPATIENT
Start: 2022-06-20 | End: 2022-06-20 | Stop reason: HOSPADM

## 2022-06-20 RX ORDER — DIPHENHYDRAMINE HCL 25 MG
25 CAPSULE ORAL EVERY 4 HOURS PRN
Status: DISCONTINUED | OUTPATIENT
Start: 2022-06-20 | End: 2022-06-22

## 2022-06-20 RX ORDER — GABAPENTIN 300 MG/1
600 CAPSULE ORAL NIGHTLY
Status: DISCONTINUED | OUTPATIENT
Start: 2022-06-20 | End: 2022-06-22

## 2022-06-20 RX ORDER — ACETAMINOPHEN 500 MG
1000 TABLET ORAL ONCE
Status: DISCONTINUED | OUTPATIENT
Start: 2022-06-20 | End: 2022-06-20 | Stop reason: HOSPADM

## 2022-06-20 RX ORDER — NALOXONE HYDROCHLORIDE 0.4 MG/ML
80 INJECTION, SOLUTION INTRAMUSCULAR; INTRAVENOUS; SUBCUTANEOUS AS NEEDED
Status: DISCONTINUED | OUTPATIENT
Start: 2022-06-20 | End: 2022-06-20 | Stop reason: HOSPADM

## 2022-06-20 RX ORDER — POLYETHYLENE GLYCOL 3350 17 G/17G
17 POWDER, FOR SOLUTION ORAL DAILY PRN
Status: DISCONTINUED | OUTPATIENT
Start: 2022-06-20 | End: 2022-06-22

## 2022-06-20 RX ADMIN — GLYCOPYRROLATE 0.8 MG: 0.2 INJECTION, SOLUTION INTRAMUSCULAR; INTRAVENOUS at 12:35:00

## 2022-06-20 RX ADMIN — ROCURONIUM BROMIDE 50 MG: 10 INJECTION, SOLUTION INTRAVENOUS at 10:57:00

## 2022-06-20 RX ADMIN — DEXAMETHASONE SODIUM PHOSPHATE 8 MG: 4 MG/ML VIAL (ML) INJECTION at 11:13:00

## 2022-06-20 RX ADMIN — NEOSTIGMINE METHYLSULFATE 5 MG: 1 INJECTION INTRAVENOUS at 12:35:00

## 2022-06-20 RX ADMIN — LIDOCAINE HYDROCHLORIDE 100 MG: 10 INJECTION, SOLUTION EPIDURAL; INFILTRATION; INTRACAUDAL; PERINEURAL at 10:57:00

## 2022-06-20 RX ADMIN — ONDANSETRON 4 MG: 2 INJECTION INTRAMUSCULAR; INTRAVENOUS at 12:25:00

## 2022-06-20 RX ADMIN — MIDAZOLAM HYDROCHLORIDE 2 MG: 1 INJECTION INTRAMUSCULAR; INTRAVENOUS at 10:53:00

## 2022-06-20 RX ADMIN — SODIUM CHLORIDE, SODIUM LACTATE, POTASSIUM CHLORIDE, CALCIUM CHLORIDE: 600; 310; 30; 20 INJECTION, SOLUTION INTRAVENOUS at 10:53:00

## 2022-06-20 NOTE — ANESTHESIA PROCEDURE NOTES
Airway  Date/Time: 6/20/2022 10:57 AM  Urgency: elective    Airway not difficult    General Information and Staff    Patient location during procedure: OR  Anesthesiologist: Shahram Carias MD  Performed: anesthesiologist     Indications and Patient Condition  Indications for airway management: anesthesia  Spontaneous Ventilation: absent  Sedation level: deep  Preoxygenated: yes  Patient position: sniffing  Mask difficulty assessment: 1 - vent by mask    Final Airway Details  Final airway type: endotracheal airway      Successful airway: ETT  Cuffed: yes   Successful intubation technique: direct laryngoscopy  Endotracheal tube insertion site: oral  Blade: Man  Blade size: #3  ETT size (mm): 7.0    Cormack-Lehane Classification: grade IIA - partial view of glottis  Placement verified by: chest auscultation and capnometry   Cuff volume (mL): 11  Measured from: lips  ETT to lips (cm): 23  Number of attempts at approach: 1  Number of other approaches attempted: 0

## 2022-06-20 NOTE — OPERATIVE REPORT
Operative Note   Patient Name: Marta Bess  10/14/1980  6/20/2022  Preoperative Diagnosis: Lumbar radiculopathy, lumbar disc herniation  Postoperative Diagnosis: same   Primary Surgeon: Ashlyn Newby M.D. Assistant: Maggy Gonzalez CSA, who was essential the case including opening, closing and retraction  Procedures: Left L 4-L5 fragmentectomy and microdiscectomy  Surgical Findings: see dictation   Anesthesia: General   Complications: none   Implants: none   Specimen: none   Drains: none   Condition: stable   Estimated Blood Loss: 5 mls   Indication for Procedure: 39year old yo female with the following history of left leg pain. .  Patient has failed non operative treatments. Patient was seen in clinic and after discussion was planned for surgery. Procedure: Patient was properly identified and brought back to OR 16. Patient was placed under anesthesia by staff. Patient was placed on OR table and all pressure points were padded. Fluoroscopy was used to confirm operative level. Patient was sterilely prepped and draped in the usual fashion. 10 mls on 0.25% marcaine with epinephrine was given as local anesthetic. Incision was made down to fascia. Fluoroscopy was used to confirmed level. A subperiosteal dissection was carried out along L 4/5. A curette was used to delineated the inferior lamina of L for. A laminotomy was performed using kerrisons. Once the dura was exposed and able to be retracted, the microscope was brought into the surgical field. The dura was retracted and a large herniated disc was noted under the PLL. Leoncio Anis Fluoroscopy again confirmed the level. The posterior longitudinal ligament was opened with a blade and disc egressed. Several fragments were removed. The disc space was entered and the disc was removed using pituitaries. Once the disc was removed, the dura was nicely decompressed. The foramen was patent. Hemostasis was achieved. The wound was closed in layers. Exofin for the skin. Patient was taken off the OR table and awakened by anesthesia.      Dictated not proofread

## 2022-06-20 NOTE — PROGRESS NOTES
Pt stated she does wear a CPAP occassionally at home. Pt does not want to wear one during her stay at this time. Pt is aware that she can call any time to get a CPAP brought to her room.      06/20/22 1745   BiPAP   $ RT Standby Charge (per 15 min) 1   $ JOSE Follow up charge Yes   BiPAP/CPAP Monitored Parameters   JOSE Protocol Yes   Toleration Refused

## 2022-06-20 NOTE — BRIEF OP NOTE
Pre-Operative Diagnosis: Lumbar radiculopathy [M54.16]  Lumbar disc herniation [M51.26]  Muscle weakness [M62.81]  DDD (degenerative disc disease), lumbar [M51.36]  Numbness and tingling [R20.0, R20.2]     Post-Operative Diagnosis: Lumbar radiculopathy [M54.16]Lumbar disc herniation [M51.26]Muscle weakness [M62.81]DDD (degenerative disc disease), lumbar [M51.36]Numbness and tingling [R20.0, R20.2]      Procedure Performed:   LEFT LUMBAR 4 - LUMBAR 5 FRAGMENTECTOMY AND MICRODISCECTOMY AND ALL INDICATED PROCEDURES    Surgeon(s) and Role:     * Harriet Edward MD - Primary    Assistant(s):  Surgical Assistant.: Oni Hernandez     Surgical Findings: See dictation     Specimen: None     Estimated Blood Loss: Blood Output: 5 mL (6/20/2022 12:23 PM)          Humera Justin MD  6/20/2022  12:42 PM

## 2022-06-21 LAB
HCT VFR BLD AUTO: 36.6 %
HGB BLD-MCNC: 12.5 G/DL

## 2022-06-21 PROCEDURE — 97535 SELF CARE MNGMENT TRAINING: CPT

## 2022-06-21 PROCEDURE — 97161 PT EVAL LOW COMPLEX 20 MIN: CPT

## 2022-06-21 PROCEDURE — 97116 GAIT TRAINING THERAPY: CPT

## 2022-06-21 PROCEDURE — 97530 THERAPEUTIC ACTIVITIES: CPT

## 2022-06-21 PROCEDURE — 97110 THERAPEUTIC EXERCISES: CPT

## 2022-06-21 PROCEDURE — 97165 OT EVAL LOW COMPLEX 30 MIN: CPT

## 2022-06-21 PROCEDURE — 85018 HEMOGLOBIN: CPT | Performed by: NEUROLOGICAL SURGERY

## 2022-06-21 PROCEDURE — 85014 HEMATOCRIT: CPT | Performed by: NEUROLOGICAL SURGERY

## 2022-06-21 RX ORDER — OXYCODONE AND ACETAMINOPHEN 10; 325 MG/1; MG/1
1 TABLET ORAL EVERY 4 HOURS PRN
Status: DISCONTINUED | OUTPATIENT
Start: 2022-06-21 | End: 2022-06-22

## 2022-06-21 RX ORDER — OXYCODONE AND ACETAMINOPHEN 10; 325 MG/1; MG/1
2 TABLET ORAL EVERY 4 HOURS PRN
Status: DISCONTINUED | OUTPATIENT
Start: 2022-06-21 | End: 2022-06-22

## 2022-06-21 RX ORDER — DIAZEPAM 5 MG/1
5 TABLET ORAL EVERY 8 HOURS PRN
Status: DISCONTINUED | OUTPATIENT
Start: 2022-06-21 | End: 2022-06-22

## 2022-06-21 NOTE — PLAN OF CARE
Pt  is AOx4, on R. A.  uses CPAP at night. VS are stable, voiding freely. Denies n/t. Denies leg pain. Incision is covered with dermabond, CDI. Pt c/o moderate pain relieved by PO pain meds. Up with min assistance, sat in the chair tonight and walked in the hallway, tolerated well. IS education complete. Ankle pumps encouraged. Pt instructed to call for assistance, belongings within reach.

## 2022-06-21 NOTE — PLAN OF CARE
Pt Is 28w4d states has had diarrhea since yesterday.  Please advise Pt a/ox4. Maintains sats on room air. Having difficulty with pain control this shift. Pain rated at 7 and up at times. Medications adjusted this shift, will have staff monitor overnight for improvement in pain control. Ice applied as needed to back for non-pharmacological pain control. Continues to report numbness sensation to left shin. Up with standby assist and walker, gait steady. Walking frequently. Cleared by PT/OT for home. Plan to home 6/22/22 when pain controlled.

## 2022-06-21 NOTE — PLAN OF CARE
Patient A & O x4. VSS, on RA. C/o severe pain, controlled with PO meds. Incision to back is clean, dry and intact. Voiding freely. Up min assist with walker and gait belt. Safety measures in place. Instructed to use call light.

## 2022-06-21 NOTE — PROGRESS NOTES
Called and spoke with Neurosurgery PA, update given on patient pain control. Per PA, patient my remain in hospital tonight for pain control.  Plan for possible discharge to home 6/22/22

## 2022-06-22 VITALS
SYSTOLIC BLOOD PRESSURE: 116 MMHG | BODY MASS INDEX: 33.72 KG/M2 | HEART RATE: 74 BPM | HEIGHT: 65 IN | WEIGHT: 202.38 LBS | OXYGEN SATURATION: 91 % | DIASTOLIC BLOOD PRESSURE: 72 MMHG | TEMPERATURE: 99 F | RESPIRATION RATE: 17 BRPM

## 2022-06-22 RX ORDER — CYCLOBENZAPRINE HCL 5 MG
5 TABLET ORAL 3 TIMES DAILY PRN
Qty: 45 TABLET | Refills: 0 | Status: SHIPPED | OUTPATIENT
Start: 2022-06-22

## 2022-06-22 RX ORDER — OXYCODONE AND ACETAMINOPHEN 10; 325 MG/1; MG/1
1-2 TABLET ORAL EVERY 4 HOURS PRN
Qty: 60 TABLET | Refills: 0 | Status: SHIPPED | OUTPATIENT
Start: 2022-06-22

## 2022-06-22 NOTE — PROGRESS NOTES
Patient discharged to home. Reviewed all discharge paperwork with patient and spouse. All questions answered. Patient and spouse verbalize understanding of all discharge teaching. Scripts electronically sent to patient home pharmacy. All belongings collected and sent with patient at time of discharge. Patient escorted via wheelchair to [place] garage and released to the care of her spouse.

## 2022-06-22 NOTE — PLAN OF CARE
Pt is A&O x4. VSS- NSR on tele. RA. Lung clear. Re-educated and encouraged use of IS while awake. Pt c/o lower back pain- rating it a 7-8/10. PRN percocet given and ice packs used with positive results. Incision to lower back remains CD&I. Continues to have numbness to L shin. Hypoactive BS- scheduled and PRN medication given per MAR. Pt reports passing flatus. Tolerating general diet. Up to BR with minimal assist and walker. Pt ambulated multiple times in hallway. Tolerating activity well. PT rec home. PIV to right hand is SL. Plan to DC home on 6/22 once pain is controlled. Problem: PAIN - ADULT  Goal: Verbalizes/displays adequate comfort level or patient's stated pain goal  Description: INTERVENTIONS:  - Encourage pt to monitor pain and request assistance  - Assess pain using appropriate pain scale  - Administer analgesics based on type and severity of pain and evaluate response  - Implement non-pharmacological measures as appropriate and evaluate response  - Consider cultural and social influences on pain and pain management  - Manage/alleviate anxiety  - Utilize distraction and/or relaxation techniques  - Monitor for opioid side effects  - Notify MD/LIP if interventions unsuccessful or patient reports new pain  - Anticipate increased pain with activity and pre-medicate as appropriate  Outcome: Progressing     Problem: SAFETY ADULT - FALL  Goal: Free from fall injury  Description: INTERVENTIONS:  - Assess pt frequently for physical needs  - Identify cognitive and physical deficits and behaviors that affect risk of falls.   - [place] fall precautions as indicated by assessment.  - Educate pt/family on patient safety including physical limitations  - Instruct pt to call for assistance with activity based on assessment  - Modify environment to reduce risk of injury  - Provide assistive devices as appropriate  - Consider OT/PT consult to assist with strengthening/mobility  - Encourage toileting schedule  Outcome: Progressing     Problem: DISCHARGE PLANNING  Goal: Discharge to home or other facility with appropriate resources  Description: INTERVENTIONS:  - Identify barriers to discharge w/pt and caregiver  - Include patient/family/discharge partner in discharge planning  - Arrange for needed discharge resources and transportation as appropriate  - Identify discharge learning needs (meds, wound care, etc)  - Arrange for interpreters to assist at discharge as needed  - Consider post-discharge preferences of patient/family/discharge partner  - Complete POLST form as appropriate  - Assess patient's ability to be responsible for managing their own health  - Refer to Case Management Department for coordinating discharge planning if the patient needs post-hospital services based on physician/LIP order or complex needs related to functional status, cognitive ability or social support system  Outcome: Progressing     Problem: Patient/Family Goals  Goal: Patient/Family Long Term Goal  Description: Patient's Long Term Goal: To be healed and back to normal activity    Interventions:  -Pain management  -PT/OT  -Follow up with PCP/Ortho as recommended    Outcome: Progressing  Goal: Patient/Family Short Term Goal  Description: Patients Short Term goal: to be able to eat, drink, go to the bathroom, change positions, sit in chair and work with therapy with pain controlled    Interventions:  -Pain management  -Activity as tolerated  -PT/OT  -Follow up with PCP/Ortho as recommended    Outcome: Progressing

## 2022-07-06 ENCOUNTER — OFFICE VISIT (OUTPATIENT)
Dept: SURGERY | Facility: CLINIC | Age: 42
End: 2022-07-06
Payer: COMMERCIAL

## 2022-07-06 VITALS — SYSTOLIC BLOOD PRESSURE: 110 MMHG | DIASTOLIC BLOOD PRESSURE: 70 MMHG | HEART RATE: 100 BPM

## 2022-07-06 DIAGNOSIS — Z98.890 STATUS POST LUMBAR SURGERY: Primary | ICD-10-CM

## 2022-07-06 DIAGNOSIS — Z98.890 POST-OPERATIVE STATE: ICD-10-CM

## 2022-07-06 PROCEDURE — 3078F DIAST BP <80 MM HG: CPT | Performed by: PHYSICIAN ASSISTANT

## 2022-07-06 PROCEDURE — 99024 POSTOP FOLLOW-UP VISIT: CPT | Performed by: PHYSICIAN ASSISTANT

## 2022-07-06 PROCEDURE — 3074F SYST BP LT 130 MM HG: CPT | Performed by: PHYSICIAN ASSISTANT

## 2022-07-11 ENCOUNTER — PATIENT MESSAGE (OUTPATIENT)
Dept: SURGERY | Facility: CLINIC | Age: 42
End: 2022-07-11

## 2022-07-18 NOTE — TELEPHONE ENCOUNTER
From: Ronn Ledezma  To: Epi Mccollum PA-C  Sent: 7/11/2022 4:47 PM CDT  Subject: Is this okay? Theone Pepe! I wanted to send you a pic of my scar today. Im okay but it is sensitive of laying on my back or any movement that might rub it against anything. Just wanted to check it with you! Thanks!

## 2022-07-22 RX ORDER — HYDROXYZINE 50 MG/1
TABLET, FILM COATED ORAL
Qty: 30 TABLET | Refills: 0 | Status: SHIPPED | OUTPATIENT
Start: 2022-07-22

## 2022-08-02 ENCOUNTER — PATIENT MESSAGE (OUTPATIENT)
Dept: FAMILY MEDICINE CLINIC | Facility: CLINIC | Age: 42
End: 2022-08-02

## 2022-08-02 ENCOUNTER — OFFICE VISIT (OUTPATIENT)
Dept: SURGERY | Facility: CLINIC | Age: 42
End: 2022-08-02
Payer: COMMERCIAL

## 2022-08-02 VITALS — SYSTOLIC BLOOD PRESSURE: 122 MMHG | HEART RATE: 92 BPM | DIASTOLIC BLOOD PRESSURE: 80 MMHG

## 2022-08-02 DIAGNOSIS — Z98.890 POST-OPERATIVE STATE: ICD-10-CM

## 2022-08-02 DIAGNOSIS — Z98.890 STATUS POST LUMBAR SURGERY: Primary | ICD-10-CM

## 2022-08-02 PROCEDURE — 3079F DIAST BP 80-89 MM HG: CPT | Performed by: PHYSICIAN ASSISTANT

## 2022-08-02 PROCEDURE — 3074F SYST BP LT 130 MM HG: CPT | Performed by: PHYSICIAN ASSISTANT

## 2022-08-02 PROCEDURE — 99024 POSTOP FOLLOW-UP VISIT: CPT | Performed by: PHYSICIAN ASSISTANT

## 2022-08-02 NOTE — TELEPHONE ENCOUNTER
From: Matti Dalyis  To: Clovis Montoya PA-C  Sent: 8/2/2022 2:05 PM CDT  Subject: Rx question    Hey there! So I got a liquid topical rx to put on my scalp for my psoriasis. I have run out and can't message any of the derms I've seen because they are not options. Is there a way I can get a refill on it? I've had the ointment version before and it was terrible and ran out quickly. The liquid form of this brought me more relief and covered more surface area instead of getting stuck in my hair. Thanks for your help!   Bryan Chacon

## 2022-08-03 RX ORDER — CLOBETASOL PROPIONATE 0.46 MG/ML
SOLUTION TOPICAL
Qty: 50 ML | Refills: 1 | Status: SHIPPED | OUTPATIENT
Start: 2022-08-03

## 2022-08-15 ENCOUNTER — TELEPHONE (OUTPATIENT)
Dept: PHYSICAL THERAPY | Facility: HOSPITAL | Age: 42
End: 2022-08-15

## 2022-08-15 RX ORDER — GABAPENTIN 300 MG/1
CAPSULE ORAL
Qty: 90 CAPSULE | Refills: 0 | Status: SHIPPED | OUTPATIENT
Start: 2022-08-15

## 2022-08-16 ENCOUNTER — TELEPHONE (OUTPATIENT)
Dept: SURGERY | Facility: CLINIC | Age: 42
End: 2022-08-16

## 2022-08-16 ENCOUNTER — TELEPHONE (OUTPATIENT)
Dept: PHYSICAL THERAPY | Facility: HOSPITAL | Age: 42
End: 2022-08-16

## 2022-08-16 NOTE — TELEPHONE ENCOUNTER
Note acknowledged     Noted PT order was placed by [facility], [facility] and was not yet authorized by insurance. Called pt to update. No answer.  TCB

## 2022-08-16 NOTE — TELEPHONE ENCOUNTER
[facility] requesting referral be placed for PT due to pt having HMO ins.   [facility] ordered PT, but referral wasn't placed

## 2022-08-18 ENCOUNTER — TELEPHONE (OUTPATIENT)
Dept: PHYSICAL THERAPY | Facility: HOSPITAL | Age: 42
End: 2022-08-18

## 2022-08-22 ENCOUNTER — TELEPHONE (OUTPATIENT)
Dept: PHYSICAL THERAPY | Facility: HOSPITAL | Age: 42
End: 2022-08-22

## 2022-08-23 ENCOUNTER — OFFICE VISIT (OUTPATIENT)
Dept: PHYSICAL THERAPY | Facility: HOSPITAL | Age: 42
End: 2022-08-23
Attending: PHYSICIAN ASSISTANT
Payer: COMMERCIAL

## 2022-08-23 PROCEDURE — 97161 PT EVAL LOW COMPLEX 20 MIN: CPT

## 2022-08-23 PROCEDURE — 97110 THERAPEUTIC EXERCISES: CPT

## 2022-09-06 ENCOUNTER — TELEPHONE (OUTPATIENT)
Dept: PHYSICAL THERAPY | Facility: HOSPITAL | Age: 42
End: 2022-09-06

## 2022-09-07 ENCOUNTER — OFFICE VISIT (OUTPATIENT)
Dept: PHYSICAL THERAPY | Facility: HOSPITAL | Age: 42
End: 2022-09-07
Attending: PHYSICIAN ASSISTANT
Payer: COMMERCIAL

## 2022-09-12 PROCEDURE — 97110 THERAPEUTIC EXERCISES: CPT

## 2022-09-12 PROCEDURE — 97140 MANUAL THERAPY 1/> REGIONS: CPT

## 2022-09-14 ENCOUNTER — TELEPHONE (OUTPATIENT)
Dept: PHYSICAL THERAPY | Facility: HOSPITAL | Age: 42
End: 2022-09-14

## 2022-09-14 ENCOUNTER — APPOINTMENT (OUTPATIENT)
Dept: PHYSICAL THERAPY | Facility: HOSPITAL | Age: 42
End: 2022-09-14
Attending: PHYSICIAN ASSISTANT
Payer: COMMERCIAL

## 2022-09-14 PROCEDURE — 97110 THERAPEUTIC EXERCISES: CPT

## 2022-09-14 PROCEDURE — 97112 NEUROMUSCULAR REEDUCATION: CPT

## 2022-09-15 ENCOUNTER — OFFICE VISIT (OUTPATIENT)
Dept: SURGERY | Facility: CLINIC | Age: 42
End: 2022-09-15
Payer: COMMERCIAL

## 2022-09-15 VITALS
HEIGHT: 65 IN | BODY MASS INDEX: 34.99 KG/M2 | OXYGEN SATURATION: 98 % | DIASTOLIC BLOOD PRESSURE: 80 MMHG | SYSTOLIC BLOOD PRESSURE: 116 MMHG | WEIGHT: 210 LBS | HEART RATE: 81 BPM

## 2022-09-15 DIAGNOSIS — M54.16 LUMBAR RADICULOPATHY: ICD-10-CM

## 2022-09-15 DIAGNOSIS — Z98.890 STATUS POST LUMBAR SURGERY: Primary | ICD-10-CM

## 2022-09-15 PROCEDURE — 3074F SYST BP LT 130 MM HG: CPT | Performed by: NEUROLOGICAL SURGERY

## 2022-09-15 PROCEDURE — 99024 POSTOP FOLLOW-UP VISIT: CPT | Performed by: NEUROLOGICAL SURGERY

## 2022-09-15 PROCEDURE — 3008F BODY MASS INDEX DOCD: CPT | Performed by: NEUROLOGICAL SURGERY

## 2022-09-15 PROCEDURE — 3079F DIAST BP 80-89 MM HG: CPT | Performed by: NEUROLOGICAL SURGERY

## 2022-09-15 RX ORDER — PHENTERMINE HYDROCHLORIDE 37.5 MG/1
37.5 TABLET ORAL EVERY MORNING
COMMUNITY
Start: 2022-08-08

## 2022-09-19 ENCOUNTER — OFFICE VISIT (OUTPATIENT)
Dept: PHYSICAL THERAPY | Facility: HOSPITAL | Age: 42
End: 2022-09-19
Attending: PHYSICIAN ASSISTANT
Payer: COMMERCIAL

## 2022-09-19 DIAGNOSIS — Z98.890 STATUS POST LUMBAR SURGERY: ICD-10-CM

## 2022-09-19 DIAGNOSIS — Z98.890 POST-OPERATIVE STATE: ICD-10-CM

## 2022-09-19 PROCEDURE — 97112 NEUROMUSCULAR REEDUCATION: CPT

## 2022-09-19 PROCEDURE — 97140 MANUAL THERAPY 1/> REGIONS: CPT

## 2022-09-19 PROCEDURE — 97110 THERAPEUTIC EXERCISES: CPT

## 2022-09-21 ENCOUNTER — APPOINTMENT (OUTPATIENT)
Dept: PHYSICAL THERAPY | Facility: HOSPITAL | Age: 42
End: 2022-09-21
Attending: PHYSICIAN ASSISTANT
Payer: COMMERCIAL

## 2022-09-26 ENCOUNTER — TELEPHONE (OUTPATIENT)
Dept: PHYSICAL THERAPY | Facility: HOSPITAL | Age: 42
End: 2022-09-26

## 2022-09-26 ENCOUNTER — APPOINTMENT (OUTPATIENT)
Dept: PHYSICAL THERAPY | Facility: HOSPITAL | Age: 42
End: 2022-09-26
Attending: PHYSICIAN ASSISTANT
Payer: COMMERCIAL

## 2022-10-03 ENCOUNTER — APPOINTMENT (OUTPATIENT)
Dept: PHYSICAL THERAPY | Facility: HOSPITAL | Age: 42
End: 2022-10-03
Attending: PHYSICIAN ASSISTANT
Payer: COMMERCIAL

## 2022-10-05 ENCOUNTER — OFFICE VISIT (OUTPATIENT)
Dept: PHYSICAL THERAPY | Facility: HOSPITAL | Age: 42
End: 2022-10-05
Attending: PHYSICIAN ASSISTANT
Payer: COMMERCIAL

## 2022-10-05 ENCOUNTER — TELEPHONE (OUTPATIENT)
Dept: PHYSICAL THERAPY | Facility: HOSPITAL | Age: 42
End: 2022-10-05

## 2022-10-05 DIAGNOSIS — Z98.890 STATUS POST LUMBAR SURGERY: ICD-10-CM

## 2022-10-05 DIAGNOSIS — Z98.890 POST-OPERATIVE STATE: ICD-10-CM

## 2022-10-05 PROCEDURE — 97140 MANUAL THERAPY 1/> REGIONS: CPT

## 2022-10-05 PROCEDURE — 97110 THERAPEUTIC EXERCISES: CPT

## 2022-10-10 ENCOUNTER — OFFICE VISIT (OUTPATIENT)
Dept: PHYSICAL THERAPY | Facility: HOSPITAL | Age: 42
End: 2022-10-10
Attending: PHYSICIAN ASSISTANT
Payer: COMMERCIAL

## 2022-10-10 DIAGNOSIS — Z98.890 POST-OPERATIVE STATE: ICD-10-CM

## 2022-10-10 DIAGNOSIS — Z98.890 STATUS POST LUMBAR SURGERY: ICD-10-CM

## 2022-10-10 PROCEDURE — 97110 THERAPEUTIC EXERCISES: CPT

## 2022-10-10 PROCEDURE — 97140 MANUAL THERAPY 1/> REGIONS: CPT

## 2022-10-10 PROCEDURE — 97112 NEUROMUSCULAR REEDUCATION: CPT

## 2022-10-12 ENCOUNTER — TELEPHONE (OUTPATIENT)
Dept: PHYSICAL THERAPY | Facility: HOSPITAL | Age: 42
End: 2022-10-12

## 2022-10-12 ENCOUNTER — APPOINTMENT (OUTPATIENT)
Dept: PHYSICAL THERAPY | Facility: HOSPITAL | Age: 42
End: 2022-10-12
Attending: PHYSICIAN ASSISTANT
Payer: COMMERCIAL

## 2022-10-15 ENCOUNTER — OFFICE VISIT (OUTPATIENT)
Dept: FAMILY MEDICINE CLINIC | Facility: CLINIC | Age: 42
End: 2022-10-15
Payer: COMMERCIAL

## 2022-10-15 VITALS
RESPIRATION RATE: 18 BRPM | WEIGHT: 210 LBS | HEART RATE: 97 BPM | DIASTOLIC BLOOD PRESSURE: 80 MMHG | HEIGHT: 65 IN | BODY MASS INDEX: 34.99 KG/M2 | TEMPERATURE: 98 F | OXYGEN SATURATION: 97 % | SYSTOLIC BLOOD PRESSURE: 112 MMHG

## 2022-10-15 DIAGNOSIS — R05.1 ACUTE COUGH: Primary | ICD-10-CM

## 2022-10-15 PROCEDURE — 99213 OFFICE O/P EST LOW 20 MIN: CPT | Performed by: FAMILY MEDICINE

## 2022-10-15 PROCEDURE — 3079F DIAST BP 80-89 MM HG: CPT | Performed by: FAMILY MEDICINE

## 2022-10-15 PROCEDURE — 3074F SYST BP LT 130 MM HG: CPT | Performed by: FAMILY MEDICINE

## 2022-10-15 PROCEDURE — 3008F BODY MASS INDEX DOCD: CPT | Performed by: FAMILY MEDICINE

## 2022-10-15 RX ORDER — PREDNISONE 20 MG/1
TABLET ORAL
Qty: 10 TABLET | Refills: 0 | Status: SHIPPED | OUTPATIENT
Start: 2022-10-15

## 2022-10-15 NOTE — PATIENT INSTRUCTIONS
Hold prednisone for now. Use OTC meds for comfort as needed--  Ibuprofen/Tylenol for fever/pain  Use Benadryl at bedtime to reduce drainage and promote rest.  Zyrtec/Claritin/Allegra in the AM to reduce nasal drainage without sedation. Use saline nasal sprays to reduce congestion and thin secretions. Use Delsym for cough. Consider applying karl's vapo-rub or eucayptus oil to chest and feet at bedtime to reduce chest and nasal congestion. Warm tea with honey, cough lozenges, vaporizers/steam etc.    If coughing spasms worsen or are not helped by the above OTC regimen, consider starting prednisone. Take prednisone-- 2 tabs once daily for 5 days. Take with food. While you are on steroids it is preferred that you take Tylenol for pain if needed rather than ibuprofen (Motrin/Ibuprofen) or Aleve. If still no better despite treatment, follow-up with your PCP. Negative

## 2022-10-17 ENCOUNTER — OFFICE VISIT (OUTPATIENT)
Dept: PHYSICAL THERAPY | Facility: HOSPITAL | Age: 42
End: 2022-10-17
Attending: PHYSICIAN ASSISTANT
Payer: COMMERCIAL

## 2022-10-17 DIAGNOSIS — Z98.890 STATUS POST LUMBAR SURGERY: ICD-10-CM

## 2022-10-17 DIAGNOSIS — Z98.890 POST-OPERATIVE STATE: ICD-10-CM

## 2022-10-17 PROCEDURE — 97112 NEUROMUSCULAR REEDUCATION: CPT

## 2022-10-17 PROCEDURE — 97140 MANUAL THERAPY 1/> REGIONS: CPT

## 2022-10-17 PROCEDURE — 97110 THERAPEUTIC EXERCISES: CPT

## 2022-10-19 ENCOUNTER — APPOINTMENT (OUTPATIENT)
Dept: PHYSICAL THERAPY | Facility: HOSPITAL | Age: 42
End: 2022-10-19
Attending: PHYSICIAN ASSISTANT
Payer: COMMERCIAL

## 2022-10-24 ENCOUNTER — PATIENT MESSAGE (OUTPATIENT)
Dept: PHYSICAL THERAPY | Facility: HOSPITAL | Age: 42
End: 2022-10-24

## 2022-10-24 ENCOUNTER — TELEPHONE (OUTPATIENT)
Dept: PHYSICAL THERAPY | Facility: HOSPITAL | Age: 42
End: 2022-10-24

## 2022-10-24 ENCOUNTER — APPOINTMENT (OUTPATIENT)
Dept: PHYSICAL THERAPY | Facility: HOSPITAL | Age: 42
End: 2022-10-24
Attending: PHYSICIAN ASSISTANT
Payer: COMMERCIAL

## 2022-10-26 ENCOUNTER — PATIENT MESSAGE (OUTPATIENT)
Dept: FAMILY MEDICINE CLINIC | Facility: CLINIC | Age: 42
End: 2022-10-26

## 2022-10-27 NOTE — TELEPHONE ENCOUNTER
From: Radha Ledezma  To: Fiordaliza Goss PA-C  Sent: 10/26/2022 2:13 PM CDT  Subject: Heart Palpatations    Hi there. I have been going through some stress at home and I'm starting to have regular heart palpitations. Is this just a panic attack? I'm struggling to sleep (this is normal for me tho) and I'm gaining weight fast. I can't get in to see the bariatric dr until January and I'm just a bit concerned. Thanks for your help.     Makenzie Garza

## 2022-11-01 NOTE — TELEPHONE ENCOUNTER
Pt states she is having a lot of anxiety and what she describes she believed is panic attacks. She is having some marital issues she is working through, working a lot and Et[employer]. Pt denies chest pain at this time, no shortness of breath. Appt scheduled to discuss. Advised to ER if develops chest pain, shortness of breath, jaw pain. Pt verbalized understanding.

## 2022-11-02 ENCOUNTER — APPOINTMENT (OUTPATIENT)
Dept: PHYSICAL THERAPY | Facility: HOSPITAL | Age: 42
End: 2022-11-02
Attending: PHYSICIAN ASSISTANT
Payer: COMMERCIAL

## 2022-11-03 NOTE — TELEPHONE ENCOUNTER
Noted that patient has routed message regarding incision update. 2 week post op office visit on 8/2/22 with DENITA Martinez. Routed to providers. Returned call. States she is confused as to why she needs potassium draw.

## 2022-11-16 ENCOUNTER — OFFICE VISIT (OUTPATIENT)
Dept: PHYSICAL THERAPY | Facility: HOSPITAL | Age: 42
End: 2022-11-16
Attending: PHYSICIAN ASSISTANT
Payer: COMMERCIAL

## 2022-11-16 DIAGNOSIS — Z98.890 POST-OPERATIVE STATE: ICD-10-CM

## 2022-11-16 DIAGNOSIS — Z98.890 STATUS POST LUMBAR SURGERY: ICD-10-CM

## 2022-11-16 PROCEDURE — 97110 THERAPEUTIC EXERCISES: CPT

## 2022-11-16 PROCEDURE — 97112 NEUROMUSCULAR REEDUCATION: CPT

## 2022-11-16 PROCEDURE — 97140 MANUAL THERAPY 1/> REGIONS: CPT

## 2022-11-28 ENCOUNTER — PATIENT MESSAGE (OUTPATIENT)
Dept: FAMILY MEDICINE CLINIC | Facility: CLINIC | Age: 42
End: 2022-11-28

## 2022-11-28 NOTE — TELEPHONE ENCOUNTER
From: Radha Ledezma  To: Fiordaliza Goss PA-C  Sent: 11/28/2022 10:45 AM CST  Subject: Update and meds    Hi, Lydia! Do we need to schedule an appointment again for med refills? Things are going better. I'm able to focus more, I'm upbeat more and have more energy. Things with marital counseling are going better as well. I'm still stressed with work and [employer], so my sleep still is not great. I'm wondering if we can increase the clonazepam, the prozack (just a little), and also get a refill on the phentermine. I feel like I probably gained weight over thanksgiving, but my  and I have been walking together when we can. Thanks for your help.     Makenzie Garza

## 2022-11-30 ENCOUNTER — OFFICE VISIT (OUTPATIENT)
Dept: PHYSICAL THERAPY | Facility: HOSPITAL | Age: 42
End: 2022-11-30
Attending: PHYSICIAN ASSISTANT
Payer: COMMERCIAL

## 2022-11-30 DIAGNOSIS — Z98.890 POST-OPERATIVE STATE: ICD-10-CM

## 2022-11-30 DIAGNOSIS — Z98.890 STATUS POST LUMBAR SURGERY: ICD-10-CM

## 2022-11-30 PROCEDURE — 97110 THERAPEUTIC EXERCISES: CPT

## 2022-11-30 PROCEDURE — 97140 MANUAL THERAPY 1/> REGIONS: CPT

## 2022-12-07 ENCOUNTER — APPOINTMENT (OUTPATIENT)
Dept: PHYSICAL THERAPY | Facility: HOSPITAL | Age: 42
End: 2022-12-07
Attending: PHYSICIAN ASSISTANT
Payer: COMMERCIAL

## 2022-12-28 ENCOUNTER — OFFICE VISIT (OUTPATIENT)
Dept: PHYSICAL THERAPY | Facility: HOSPITAL | Age: 42
End: 2022-12-28
Attending: PHYSICIAN ASSISTANT
Payer: COMMERCIAL

## 2022-12-28 PROCEDURE — 97110 THERAPEUTIC EXERCISES: CPT

## 2022-12-31 ENCOUNTER — OFFICE VISIT (OUTPATIENT)
Dept: FAMILY MEDICINE CLINIC | Facility: CLINIC | Age: 42
End: 2022-12-31
Payer: COMMERCIAL

## 2022-12-31 VITALS
RESPIRATION RATE: 16 BRPM | WEIGHT: 220 LBS | HEIGHT: 65 IN | BODY MASS INDEX: 36.65 KG/M2 | TEMPERATURE: 98 F | SYSTOLIC BLOOD PRESSURE: 120 MMHG | HEART RATE: 83 BPM | OXYGEN SATURATION: 98 % | DIASTOLIC BLOOD PRESSURE: 82 MMHG

## 2022-12-31 DIAGNOSIS — J40 BRONCHITIS: Primary | ICD-10-CM

## 2022-12-31 PROCEDURE — 99213 OFFICE O/P EST LOW 20 MIN: CPT | Performed by: NURSE PRACTITIONER

## 2022-12-31 PROCEDURE — 3079F DIAST BP 80-89 MM HG: CPT | Performed by: NURSE PRACTITIONER

## 2022-12-31 PROCEDURE — 3008F BODY MASS INDEX DOCD: CPT | Performed by: NURSE PRACTITIONER

## 2022-12-31 PROCEDURE — 3074F SYST BP LT 130 MM HG: CPT | Performed by: NURSE PRACTITIONER

## 2022-12-31 RX ORDER — BENZONATATE 200 MG/1
200 CAPSULE ORAL 3 TIMES DAILY PRN
Qty: 30 CAPSULE | Refills: 0 | Status: SHIPPED | OUTPATIENT
Start: 2022-12-31

## 2022-12-31 RX ORDER — PREDNISONE 20 MG/1
40 TABLET ORAL DAILY
Qty: 10 TABLET | Refills: 0 | Status: SHIPPED | OUTPATIENT
Start: 2022-12-31 | End: 2023-01-05

## 2022-12-31 RX ORDER — ALBUTEROL SULFATE 90 UG/1
2 AEROSOL, METERED RESPIRATORY (INHALATION) EVERY 6 HOURS PRN
Qty: 6.7 G | Refills: 0 | Status: SHIPPED | OUTPATIENT
Start: 2022-12-31

## 2023-01-02 ENCOUNTER — APPOINTMENT (OUTPATIENT)
Dept: GENERAL RADIOLOGY | Age: 43
End: 2023-01-02
Attending: PHYSICIAN ASSISTANT
Payer: COMMERCIAL

## 2023-01-02 ENCOUNTER — HOSPITAL ENCOUNTER (EMERGENCY)
Age: 43
Discharge: HOME | End: 2023-01-02
Attending: EMERGENCY MEDICINE
Payer: COMMERCIAL

## 2023-01-02 VITALS
SYSTOLIC BLOOD PRESSURE: 153 MMHG | DIASTOLIC BLOOD PRESSURE: 78 MMHG | HEART RATE: 91 BPM | BODY MASS INDEX: 37.49 KG/M2 | TEMPERATURE: 97 F | HEIGHT: 65 IN | WEIGHT: 225 LBS | RESPIRATION RATE: 22 BRPM | OXYGEN SATURATION: 98 %

## 2023-01-02 DIAGNOSIS — J40 BRONCHITIS: Primary | ICD-10-CM

## 2023-01-02 LAB
ALBUMIN SERPL-MCNC: 3.6 G/DL (ref 3.4–5)
ALBUMIN/GLOB SERPL: 1 {RATIO} (ref 1–2)
ALP LIVER SERPL-CCNC: 60 U/L
ALT SERPL-CCNC: 19 U/L
ANION GAP SERPL CALC-SCNC: 6 MMOL/L (ref 0–18)
AST SERPL-CCNC: 6 U/L (ref 15–37)
BASOPHILS # BLD AUTO: 0.05 X10(3) UL (ref 0–0.2)
BASOPHILS NFR BLD AUTO: 0.4 %
BILIRUB SERPL-MCNC: 0.2 MG/DL (ref 0.1–2)
BUN BLD-MCNC: 10 MG/DL (ref 7–18)
CALCIUM BLD-MCNC: 8.8 MG/DL (ref 8.5–10.1)
CHLORIDE SERPL-SCNC: 110 MMOL/L (ref 98–112)
CO2 SERPL-SCNC: 25 MMOL/L (ref 21–32)
CREAT BLD-MCNC: 0.83 MG/DL
EOSINOPHIL # BLD AUTO: 0.05 X10(3) UL (ref 0–0.7)
EOSINOPHIL NFR BLD AUTO: 0.4 %
ERYTHROCYTE [DISTWIDTH] IN BLOOD BY AUTOMATED COUNT: 13.3 %
GFR SERPLBLD BASED ON 1.73 SQ M-ARVRAT: 90 ML/MIN/1.73M2 (ref 60–?)
GLOBULIN PLAS-MCNC: 3.6 G/DL (ref 2.8–4.4)
GLUCOSE BLD-MCNC: 112 MG/DL (ref 70–99)
HCT VFR BLD AUTO: 38.8 %
HGB BLD-MCNC: 13.3 G/DL
IMM GRANULOCYTES # BLD AUTO: 0.09 X10(3) UL (ref 0–1)
IMM GRANULOCYTES NFR BLD: 0.7 %
LYMPHOCYTES # BLD AUTO: 1.36 X10(3) UL (ref 1–4)
LYMPHOCYTES NFR BLD AUTO: 11.2 %
MCH RBC QN AUTO: 32.2 PG (ref 26–34)
MCHC RBC AUTO-ENTMCNC: 34.3 G/DL (ref 31–37)
MCV RBC AUTO: 93.9 FL
MONOCYTES # BLD AUTO: 0.4 X10(3) UL (ref 0.1–1)
MONOCYTES NFR BLD AUTO: 3.3 %
NEUTROPHILS # BLD AUTO: 10.22 X10 (3) UL (ref 1.5–7.7)
NEUTROPHILS # BLD AUTO: 10.22 X10(3) UL (ref 1.5–7.7)
NEUTROPHILS NFR BLD AUTO: 84 %
OSMOLALITY SERPL CALC.SUM OF ELEC: 292 MOSM/KG (ref 275–295)
PLATELET # BLD AUTO: 228 10(3)UL (ref 150–450)
POCT INFLUENZA A: NEGATIVE
POCT INFLUENZA B: NEGATIVE
POTASSIUM SERPL-SCNC: 4 MMOL/L (ref 3.5–5.1)
PROT SERPL-MCNC: 7.2 G/DL (ref 6.4–8.2)
RBC # BLD AUTO: 4.13 X10(6)UL
SARS-COV-2 RNA RESP QL NAA+PROBE: NOT DETECTED
SODIUM SERPL-SCNC: 141 MMOL/L (ref 136–145)
WBC # BLD AUTO: 12.2 X10(3) UL (ref 4–11)

## 2023-01-02 PROCEDURE — 87502 INFLUENZA DNA AMP PROBE: CPT | Performed by: EMERGENCY MEDICINE

## 2023-01-02 PROCEDURE — 99283 EMERGENCY DEPT VISIT LOW MDM: CPT

## 2023-01-02 PROCEDURE — 36415 COLL VENOUS BLD VENIPUNCTURE: CPT

## 2023-01-02 PROCEDURE — 85025 COMPLETE CBC W/AUTO DIFF WBC: CPT | Performed by: PHYSICIAN ASSISTANT

## 2023-01-02 PROCEDURE — 71046 X-RAY EXAM CHEST 2 VIEWS: CPT | Performed by: PHYSICIAN ASSISTANT

## 2023-01-02 PROCEDURE — 99284 EMERGENCY DEPT VISIT MOD MDM: CPT

## 2023-01-02 PROCEDURE — 80053 COMPREHEN METABOLIC PANEL: CPT | Performed by: PHYSICIAN ASSISTANT

## 2023-01-02 NOTE — ED INITIAL ASSESSMENT (HPI)
Pt reports cough for a month, states she was is not feeling better, pt was seen at walk in clinic on Saturday dx with bronchitis and started on albuterol inhaler, benzonatate and prednisone.

## 2023-01-04 ENCOUNTER — PATIENT OUTREACH (OUTPATIENT)
Dept: CASE MANAGEMENT | Age: 43
End: 2023-01-04

## 2023-01-04 NOTE — PROGRESS NOTES
1st attempt; pt had recent ED visit, calling to offer PCP f/u apt (dc 1/2)      Dr. Janis Fraser scheduled 1/6 @ 11:00 AM  [place] [address] [facility] [address]  [phone]    Appt details were confirmed with pt     Closing encounter

## 2023-01-06 ENCOUNTER — OFFICE VISIT (OUTPATIENT)
Dept: FAMILY MEDICINE CLINIC | Facility: CLINIC | Age: 43
End: 2023-01-06
Payer: COMMERCIAL

## 2023-01-06 VITALS
OXYGEN SATURATION: 98 % | DIASTOLIC BLOOD PRESSURE: 84 MMHG | HEIGHT: 65 IN | BODY MASS INDEX: 39.15 KG/M2 | SYSTOLIC BLOOD PRESSURE: 132 MMHG | RESPIRATION RATE: 16 BRPM | WEIGHT: 235 LBS | HEART RATE: 100 BPM

## 2023-01-06 DIAGNOSIS — J42 CHRONIC BRONCHITIS, UNSPECIFIED CHRONIC BRONCHITIS TYPE (HCC): Primary | ICD-10-CM

## 2023-01-06 PROCEDURE — 99213 OFFICE O/P EST LOW 20 MIN: CPT | Performed by: FAMILY MEDICINE

## 2023-01-06 PROCEDURE — 3075F SYST BP GE 130 - 139MM HG: CPT | Performed by: FAMILY MEDICINE

## 2023-01-06 PROCEDURE — 3008F BODY MASS INDEX DOCD: CPT | Performed by: FAMILY MEDICINE

## 2023-01-06 PROCEDURE — 3079F DIAST BP 80-89 MM HG: CPT | Performed by: FAMILY MEDICINE

## 2023-01-06 RX ORDER — AZITHROMYCIN 250 MG/1
TABLET, FILM COATED ORAL
Qty: 6 TABLET | Refills: 0 | Status: SHIPPED | OUTPATIENT
Start: 2023-01-06 | End: 2023-01-11

## 2023-01-18 ENCOUNTER — PATIENT MESSAGE (OUTPATIENT)
Dept: FAMILY MEDICINE CLINIC | Facility: CLINIC | Age: 43
End: 2023-01-18

## 2023-01-19 NOTE — TELEPHONE ENCOUNTER
From: Marni Ledezma  To: Castillo Multani DO  Sent: 1/18/2023 2:48 PM CST  Subject: Still not feeling great    Hi, Dr. Abilio Nolasco. I'm coughing less, but my throat is still terribly sore. I'm still testing negative for covid as well. Should the antibiotics still working?     Thanks,    Malena Martin

## 2023-01-26 ENCOUNTER — OFFICE VISIT (OUTPATIENT)
Dept: INTERNAL MEDICINE CLINIC | Facility: CLINIC | Age: 43
End: 2023-01-26
Payer: COMMERCIAL

## 2023-01-26 VITALS
RESPIRATION RATE: 16 BRPM | DIASTOLIC BLOOD PRESSURE: 80 MMHG | HEIGHT: 65 IN | HEART RATE: 90 BPM | BODY MASS INDEX: 38.15 KG/M2 | OXYGEN SATURATION: 97 % | WEIGHT: 229 LBS | SYSTOLIC BLOOD PRESSURE: 140 MMHG

## 2023-01-26 DIAGNOSIS — E53.8 VITAMIN B12 DEFICIENCY: ICD-10-CM

## 2023-01-26 DIAGNOSIS — G47.33 OBSTRUCTIVE SLEEP APNEA SYNDROME: ICD-10-CM

## 2023-01-26 DIAGNOSIS — E66.09 CLASS 1 OBESITY DUE TO EXCESS CALORIES WITHOUT SERIOUS COMORBIDITY WITH BODY MASS INDEX (BMI) OF 34.0 TO 34.9 IN ADULT: Primary | ICD-10-CM

## 2023-01-26 DIAGNOSIS — Z51.81 THERAPEUTIC DRUG MONITORING: ICD-10-CM

## 2023-01-26 DIAGNOSIS — D70.9 NEUTROPENIA, UNSPECIFIED TYPE (HCC): ICD-10-CM

## 2023-01-26 DIAGNOSIS — F50.81 BINGE EATING DISORDER: ICD-10-CM

## 2023-01-26 PROBLEM — E66.01 MORBID (SEVERE) OBESITY DUE TO EXCESS CALORIES (HCC): Status: ACTIVE | Noted: 2023-01-26

## 2023-01-26 PROCEDURE — 99204 OFFICE O/P NEW MOD 45 MIN: CPT | Performed by: INTERNAL MEDICINE

## 2023-01-26 PROCEDURE — 3079F DIAST BP 80-89 MM HG: CPT | Performed by: INTERNAL MEDICINE

## 2023-01-26 PROCEDURE — 3008F BODY MASS INDEX DOCD: CPT | Performed by: INTERNAL MEDICINE

## 2023-01-26 PROCEDURE — 3077F SYST BP >= 140 MM HG: CPT | Performed by: INTERNAL MEDICINE

## 2023-01-26 NOTE — PATIENT INSTRUCTIONS
Plan:  Continue with medications:   Go to the lab for blood work   Follow up with me in 1 month  Schedule follow up appointments: Madalyn Bazan (dietitian) or Christy Delgado (presurgery dietitian)   Check for insurance coverage for dietitian and labwork prior to scheduling appointment. Please try to work on the following dietary changes:  1. Drink lots of water and cut down on soda/juice consumption if soda/juice drinker  2. Eat breakfast daily (within 1 hour)  3. Focus on protein: (15-30 grams with each meal) ie. greek yogurt, cottage cheese, string cheese, hard boiled eggs   4. Healthy snacks: peanut butter and apples, hummus and carrots, yogurt and berries, nuts (1/4 cup), tuna and crackers    Premier protein shakes  Quest bars  Power crunch bars   Siggi yogurt (9% milk fat)   [place] balanced breaks (cheese and nuts)- without chocolate   5. Reduce carbohydrates which includes sweets as well as rice, pasta, potatoes, bread, corn and instead choose whole grain options or more protein or vegetables. 6. Get a good night of sleep  7. Try to decrease stress in life- headspace / calm    Please download apps:  1. \"My Fitness Pal\" (other options are Lose it or Spark People) to help you to monitor daily dietary intake and you will be able to see if you are eating the right amount of calories, protein, and carbs. When you set the becki up chose 1-2 lbs/week as a goal.  2. \"7 minute workout\" to help with exercise/activity which takes 7 minutes of your day and that you can do at home! 3. \"Calm\" which helps with mindfulness, meditation, clarity, sleep, and gary to your daily life. 4. ftopia blog for healthy recipe ideas  5. [employer] for low carb resources    HIGH PROTEIN SNACK IDEAS  -cottage cheese  -plain yogurt  -kefir  -hard-boiled eggs  -natural cheeses  -nuts (measure portion size)   -unsweetened nut butters  -dried edamame   -rena seeds soaked in water or almond milk  -soy nuts  -cured meats (monitor for sodium issues)   -hummus with vegetables  -bean dip with vegetables    FRUIT  Low carb fruit options   Raspberries: Half a cup (60 grams) contains 3 grams of carbs. Blackberries: Half a cup (70 grams) contains 4 grams of carbs. Strawberries: Half a cup (100 grams) contains 6 grams of carbs. Blueberries: Half a cup (50 grams) contains 6 grams of carbs. Plum: One medium-sized (80 grams) contains 6 grams of carbs.     VEGETABLES  Low carb vegetables

## 2023-02-13 ENCOUNTER — TELEPHONE (OUTPATIENT)
Dept: INTERNAL MEDICINE CLINIC | Facility: CLINIC | Age: 43
End: 2023-02-13

## 2023-02-13 ENCOUNTER — LABORATORY ENCOUNTER (OUTPATIENT)
Dept: LAB | Age: 43
End: 2023-02-13
Attending: INTERNAL MEDICINE
Payer: COMMERCIAL

## 2023-02-13 DIAGNOSIS — E53.8 VITAMIN B12 DEFICIENCY: ICD-10-CM

## 2023-02-13 DIAGNOSIS — G47.33 OBSTRUCTIVE SLEEP APNEA SYNDROME: ICD-10-CM

## 2023-02-13 DIAGNOSIS — D70.9 NEUTROPENIA, UNSPECIFIED TYPE (HCC): ICD-10-CM

## 2023-02-13 DIAGNOSIS — Z51.81 THERAPEUTIC DRUG MONITORING: ICD-10-CM

## 2023-02-13 DIAGNOSIS — E66.09 CLASS 1 OBESITY DUE TO EXCESS CALORIES WITHOUT SERIOUS COMORBIDITY WITH BODY MASS INDEX (BMI) OF 34.0 TO 34.9 IN ADULT: Primary | ICD-10-CM

## 2023-02-13 DIAGNOSIS — E66.09 CLASS 1 OBESITY DUE TO EXCESS CALORIES WITHOUT SERIOUS COMORBIDITY WITH BODY MASS INDEX (BMI) OF 34.0 TO 34.9 IN ADULT: ICD-10-CM

## 2023-02-13 LAB
CRP SERPL HS-MCNC: 6.23 MG/L (ref ?–3)
EST. AVERAGE GLUCOSE BLD GHB EST-MCNC: 100 MG/DL (ref 68–126)
FOLATE SERPL-MCNC: 7.6 NG/ML (ref 8.7–?)
HBA1C MFR BLD: 5.1 % (ref ?–5.7)
T4 FREE SERPL-MCNC: 0.8 NG/DL (ref 0.8–1.7)
TSI SER-ACNC: 2.19 MIU/ML (ref 0.36–3.74)
VIT B12 SERPL-MCNC: 226 PG/ML (ref 193–986)
VIT D+METAB SERPL-MCNC: 13.6 NG/ML (ref 30–100)

## 2023-02-13 PROCEDURE — 36415 COLL VENOUS BLD VENIPUNCTURE: CPT

## 2023-02-13 PROCEDURE — 83036 HEMOGLOBIN GLYCOSYLATED A1C: CPT

## 2023-02-13 PROCEDURE — 82306 VITAMIN D 25 HYDROXY: CPT

## 2023-02-13 PROCEDURE — 86141 C-REACTIVE PROTEIN HS: CPT

## 2023-02-13 PROCEDURE — 84439 ASSAY OF FREE THYROXINE: CPT

## 2023-02-13 PROCEDURE — 82746 ASSAY OF FOLIC ACID SERUM: CPT

## 2023-02-13 PROCEDURE — 84443 ASSAY THYROID STIM HORMONE: CPT

## 2023-02-13 PROCEDURE — 82607 VITAMIN B-12: CPT

## 2023-02-14 ENCOUNTER — OFFICE VISIT (OUTPATIENT)
Dept: INTERNAL MEDICINE CLINIC | Facility: CLINIC | Age: 43
End: 2023-02-14
Payer: COMMERCIAL

## 2023-02-14 DIAGNOSIS — E66.01 MORBID (SEVERE) OBESITY DUE TO EXCESS CALORIES (HCC): ICD-10-CM

## 2023-02-14 DIAGNOSIS — E66.9 CLASS 1 OBESITY WITHOUT SERIOUS COMORBIDITY WITH BODY MASS INDEX (BMI) OF 34.0 TO 34.9 IN ADULT, UNSPECIFIED OBESITY TYPE: ICD-10-CM

## 2023-02-14 PROCEDURE — 97802 MEDICAL NUTRITION INDIV IN: CPT

## 2023-02-15 ENCOUNTER — TELEPHONE (OUTPATIENT)
Dept: INTERNAL MEDICINE CLINIC | Facility: CLINIC | Age: 43
End: 2023-02-15

## 2023-02-15 NOTE — TELEPHONE ENCOUNTER
Faxed request for PA for [facility]RACQUEL DOBSON sent by Nannie Koyanagi initiate in epic  Cannot use CMM  Called to get information - use Prime  ID [phone]

## 2023-02-16 NOTE — TELEPHONE ENCOUNTER
Prime form filled out  Diagnosis  E66.09  Obesity, JOSE  G47.33,  BED F50.81  New start  BMI greater than 30    Fax with last note to

## 2023-02-23 ENCOUNTER — NURSE ONLY (OUTPATIENT)
Dept: INTERNAL MEDICINE CLINIC | Facility: CLINIC | Age: 43
End: 2023-02-23
Payer: COMMERCIAL

## 2023-02-23 DIAGNOSIS — E53.8 VITAMIN B12 DEFICIENCY: Primary | ICD-10-CM

## 2023-02-23 PROCEDURE — 96372 THER/PROPH/DIAG INJ SC/IM: CPT | Performed by: INTERNAL MEDICINE

## 2023-02-23 RX ORDER — CYANOCOBALAMIN 1000 UG/ML
1000 INJECTION, SOLUTION INTRAMUSCULAR; SUBCUTANEOUS ONCE
Status: COMPLETED | OUTPATIENT
Start: 2023-02-23 | End: 2023-02-23

## 2023-02-23 RX ADMIN — CYANOCOBALAMIN 1000 MCG: 1000 INJECTION, SOLUTION INTRAMUSCULAR; SUBCUTANEOUS at 08:37:00

## 2023-03-14 ENCOUNTER — OFFICE VISIT (OUTPATIENT)
Dept: INTERNAL MEDICINE CLINIC | Facility: CLINIC | Age: 43
End: 2023-03-14
Payer: COMMERCIAL

## 2023-03-14 VITALS — WEIGHT: 224 LBS | BODY MASS INDEX: 37 KG/M2

## 2023-03-14 DIAGNOSIS — G47.33 OBSTRUCTIVE SLEEP APNEA SYNDROME: ICD-10-CM

## 2023-03-14 DIAGNOSIS — E66.09 CLASS 1 OBESITY DUE TO EXCESS CALORIES WITHOUT SERIOUS COMORBIDITY WITH BODY MASS INDEX (BMI) OF 34.0 TO 34.9 IN ADULT: ICD-10-CM

## 2023-03-14 PROCEDURE — 97803 MED NUTRITION INDIV SUBSEQ: CPT

## 2023-03-15 NOTE — TELEPHONE ENCOUNTER
I called to check on status of PA for [facility]RACQUEL DOBSON to [employer] Harper Torres  Approved 1/18/23 to 2/18/2024  [id number]

## 2023-03-22 ENCOUNTER — NURSE ONLY (OUTPATIENT)
Dept: INTERNAL MEDICINE CLINIC | Facility: CLINIC | Age: 43
End: 2023-03-22
Payer: COMMERCIAL

## 2023-03-22 DIAGNOSIS — E53.8 VITAMIN B12 DEFICIENCY: Primary | ICD-10-CM

## 2023-03-22 PROCEDURE — 96372 THER/PROPH/DIAG INJ SC/IM: CPT | Performed by: INTERNAL MEDICINE

## 2023-03-22 RX ORDER — CYANOCOBALAMIN 1000 UG/ML
1000 INJECTION, SOLUTION INTRAMUSCULAR; SUBCUTANEOUS ONCE
Status: COMPLETED | OUTPATIENT
Start: 2023-03-22 | End: 2023-03-22

## 2023-03-22 RX ADMIN — CYANOCOBALAMIN 1000 MCG: 1000 INJECTION, SOLUTION INTRAMUSCULAR; SUBCUTANEOUS at 13:07:00

## 2023-03-30 ENCOUNTER — PATIENT MESSAGE (OUTPATIENT)
Dept: INTERNAL MEDICINE CLINIC | Facility: CLINIC | Age: 43
End: 2023-03-30

## 2023-03-31 NOTE — TELEPHONE ENCOUNTER
From: Rey Ledezma  To: Eduard Oates MD  Sent: 3/30/2023 2:05 PM CDT  Subject: LKQBNK refill    Hi there. The pharmacy needs authorization for a refill bc there were no refills on the rx. Thank you. I just got back from [place] and would have taken my next one today.

## 2023-04-10 NOTE — TELEPHONE ENCOUNTER
Requesting Wegovy increase  LOV: 1/23/23  RTC: not noted  Last Relevant Labs: na  Filled: 1/26/23 #2ml with 0 refills  Wegovy 0.5 mg    Future Appointments   Date Time Provider Osmin Esparza   4/13/2023 10:40 AM [facility]ne [facility]in [facility]   5/17/2023  4:30 PM Marina Liao RD [facility]

## 2023-04-13 ENCOUNTER — NURSE ONLY (OUTPATIENT)
Dept: INTERNAL MEDICINE CLINIC | Facility: CLINIC | Age: 43
End: 2023-04-13
Payer: COMMERCIAL

## 2023-04-13 DIAGNOSIS — E53.8 VITAMIN B12 DEFICIENCY: Primary | ICD-10-CM

## 2023-04-13 PROCEDURE — 96372 THER/PROPH/DIAG INJ SC/IM: CPT | Performed by: INTERNAL MEDICINE

## 2023-04-13 RX ORDER — CYANOCOBALAMIN 1000 UG/ML
1000 INJECTION, SOLUTION INTRAMUSCULAR; SUBCUTANEOUS ONCE
Status: COMPLETED | OUTPATIENT
Start: 2023-04-13 | End: 2023-04-13

## 2023-04-13 RX ADMIN — CYANOCOBALAMIN 1000 MCG: 1000 INJECTION, SOLUTION INTRAMUSCULAR; SUBCUTANEOUS at 10:41:00

## 2023-04-14 DIAGNOSIS — F32.A ANXIETY AND DEPRESSION: ICD-10-CM

## 2023-04-14 DIAGNOSIS — Z63.0 MARITAL STRESS: ICD-10-CM

## 2023-04-14 DIAGNOSIS — F41.9 ANXIETY AND DEPRESSION: ICD-10-CM

## 2023-04-14 DIAGNOSIS — E66.09 CLASS 2 OBESITY DUE TO EXCESS CALORIES WITHOUT SERIOUS COMORBIDITY WITH BODY MASS INDEX (BMI) OF 36.0 TO 36.9 IN ADULT: ICD-10-CM

## 2023-04-14 SDOH — SOCIAL STABILITY - SOCIAL INSECURITY: PROBLEMS IN RELATIONSHIP WITH SPOUSE OR PARTNER: Z63.0

## 2023-04-18 RX ORDER — CLONAZEPAM 0.5 MG/1
TABLET ORAL
Qty: 30 TABLET | Refills: 0 | Status: SHIPPED | OUTPATIENT
Start: 2023-04-18

## 2023-04-18 RX ORDER — SEMAGLUTIDE 0.5 MG/.5ML
INJECTION, SOLUTION SUBCUTANEOUS
Qty: 2 ML | Refills: 0 | OUTPATIENT
Start: 2023-04-18

## 2023-04-18 RX ORDER — PHENTERMINE HYDROCHLORIDE 37.5 MG/1
TABLET ORAL
Qty: 30 TABLET | Refills: 0 | Status: SHIPPED | OUTPATIENT
Start: 2023-04-18 | End: 2023-04-24

## 2023-04-24 ENCOUNTER — TELEMEDICINE (OUTPATIENT)
Dept: INTERNAL MEDICINE CLINIC | Facility: CLINIC | Age: 43
End: 2023-04-24
Payer: COMMERCIAL

## 2023-04-24 DIAGNOSIS — Z51.81 THERAPEUTIC DRUG MONITORING: Primary | ICD-10-CM

## 2023-04-24 DIAGNOSIS — E66.01 MORBID (SEVERE) OBESITY DUE TO EXCESS CALORIES (HCC): ICD-10-CM

## 2023-04-24 PROCEDURE — 99213 OFFICE O/P EST LOW 20 MIN: CPT | Performed by: INTERNAL MEDICINE

## 2023-04-24 RX ORDER — TOPIRAMATE 25 MG/1
25 TABLET ORAL 2 TIMES DAILY
Qty: 60 TABLET | Refills: 0 | Status: SHIPPED | OUTPATIENT
Start: 2023-04-24 | End: 2023-05-24

## 2023-05-17 ENCOUNTER — OFFICE VISIT (OUTPATIENT)
Dept: INTERNAL MEDICINE CLINIC | Facility: CLINIC | Age: 43
End: 2023-05-17
Payer: COMMERCIAL

## 2023-05-17 VITALS — WEIGHT: 206 LBS | BODY MASS INDEX: 34 KG/M2

## 2023-05-17 DIAGNOSIS — E66.9 CLASS 1 OBESITY WITHOUT SERIOUS COMORBIDITY WITH BODY MASS INDEX (BMI) OF 34.0 TO 34.9 IN ADULT, UNSPECIFIED OBESITY TYPE: ICD-10-CM

## 2023-05-17 PROCEDURE — 97803 MED NUTRITION INDIV SUBSEQ: CPT

## 2023-05-17 NOTE — PATIENT INSTRUCTIONS
Begin strength training (pilates and gurjit) when school is over   Keep walking 3 days per week   Consider over the counter B-complex or B12 supplement; as well as 2000 international units Vitamin D   Aim for 20-30 grams of protein per meal

## 2023-06-05 ENCOUNTER — TELEPHONE (OUTPATIENT)
Dept: INTERNAL MEDICINE CLINIC | Facility: CLINIC | Age: 43
End: 2023-06-05

## 2023-06-05 NOTE — TELEPHONE ENCOUNTER
Raphael Gtz called from [facility]- left vmail on Friday June 2 at 340pm  , stated Directions and quanity do not add up for rx vitamin D

## 2023-06-07 ENCOUNTER — TELEPHONE (OUTPATIENT)
Dept: INTERNAL MEDICINE CLINIC | Facility: CLINIC | Age: 43
End: 2023-06-07

## 2023-06-07 RX ORDER — ERGOCALCIFEROL 1.25 MG/1
50000 CAPSULE ORAL
Qty: 24 CAPSULE | Refills: 0 | Status: SHIPPED | OUTPATIENT
Start: 2023-06-07

## 2023-06-08 ENCOUNTER — NURSE ONLY (OUTPATIENT)
Dept: INTERNAL MEDICINE CLINIC | Facility: CLINIC | Age: 43
End: 2023-06-08
Payer: COMMERCIAL

## 2023-06-08 DIAGNOSIS — E53.8 VITAMIN B12 DEFICIENCY: Primary | ICD-10-CM

## 2023-06-08 RX ORDER — CYANOCOBALAMIN 1000 UG/ML
1000 INJECTION, SOLUTION INTRAMUSCULAR; SUBCUTANEOUS ONCE
Status: SHIPPED | OUTPATIENT
Start: 2023-06-08

## 2023-06-14 ENCOUNTER — OFFICE VISIT (OUTPATIENT)
Dept: INTERNAL MEDICINE CLINIC | Facility: CLINIC | Age: 43
End: 2023-06-14
Payer: COMMERCIAL

## 2023-06-14 VITALS
HEIGHT: 65 IN | RESPIRATION RATE: 16 BRPM | OXYGEN SATURATION: 98 % | DIASTOLIC BLOOD PRESSURE: 80 MMHG | SYSTOLIC BLOOD PRESSURE: 128 MMHG | HEART RATE: 86 BPM | BODY MASS INDEX: 34.82 KG/M2 | WEIGHT: 209 LBS

## 2023-06-14 DIAGNOSIS — G47.33 OBSTRUCTIVE SLEEP APNEA SYNDROME: ICD-10-CM

## 2023-06-14 DIAGNOSIS — E66.01 MORBID (SEVERE) OBESITY DUE TO EXCESS CALORIES (HCC): ICD-10-CM

## 2023-06-14 DIAGNOSIS — E53.8 VITAMIN B12 DEFICIENCY: ICD-10-CM

## 2023-06-14 DIAGNOSIS — Z51.81 THERAPEUTIC DRUG MONITORING: Primary | ICD-10-CM

## 2023-06-14 PROBLEM — G20 PARKINSON'S DISEASE (HCC): Status: ACTIVE | Noted: 2023-06-14

## 2023-06-14 PROBLEM — G20.A1 PARKINSON'S DISEASE (HCC): Status: ACTIVE | Noted: 2023-06-14

## 2023-06-14 PROBLEM — G20.A1 PARKINSON'S DISEASE: Status: ACTIVE | Noted: 2023-06-14

## 2023-06-14 PROCEDURE — 3008F BODY MASS INDEX DOCD: CPT | Performed by: INTERNAL MEDICINE

## 2023-06-14 PROCEDURE — 3074F SYST BP LT 130 MM HG: CPT | Performed by: INTERNAL MEDICINE

## 2023-06-14 PROCEDURE — 99213 OFFICE O/P EST LOW 20 MIN: CPT | Performed by: INTERNAL MEDICINE

## 2023-06-14 PROCEDURE — 3079F DIAST BP 80-89 MM HG: CPT | Performed by: INTERNAL MEDICINE

## 2023-07-07 ENCOUNTER — OFFICE VISIT (OUTPATIENT)
Dept: FAMILY MEDICINE CLINIC | Facility: CLINIC | Age: 43
End: 2023-07-07
Payer: COMMERCIAL

## 2023-07-07 VITALS
OXYGEN SATURATION: 98 % | SYSTOLIC BLOOD PRESSURE: 128 MMHG | BODY MASS INDEX: 35.32 KG/M2 | RESPIRATION RATE: 16 BRPM | HEART RATE: 73 BPM | HEIGHT: 65 IN | DIASTOLIC BLOOD PRESSURE: 76 MMHG | WEIGHT: 212 LBS

## 2023-07-07 DIAGNOSIS — L40.9 PSORIASIS: Primary | ICD-10-CM

## 2023-07-07 DIAGNOSIS — Z12.31 VISIT FOR SCREENING MAMMOGRAM: ICD-10-CM

## 2023-07-07 DIAGNOSIS — G47.9 DIFFICULTY SLEEPING: ICD-10-CM

## 2023-07-07 PROCEDURE — 3078F DIAST BP <80 MM HG: CPT | Performed by: PHYSICIAN ASSISTANT

## 2023-07-07 PROCEDURE — 3008F BODY MASS INDEX DOCD: CPT | Performed by: PHYSICIAN ASSISTANT

## 2023-07-07 PROCEDURE — 99214 OFFICE O/P EST MOD 30 MIN: CPT | Performed by: PHYSICIAN ASSISTANT

## 2023-07-07 PROCEDURE — 3074F SYST BP LT 130 MM HG: CPT | Performed by: PHYSICIAN ASSISTANT

## 2023-07-07 RX ORDER — CLONAZEPAM 0.5 MG/1
0.5 TABLET ORAL NIGHTLY PRN
Qty: 30 TABLET | Refills: 5 | Status: SHIPPED | OUTPATIENT
Start: 2023-07-07

## 2023-07-07 NOTE — PROGRESS NOTES
Subjective:   Patient ID: Paula King is a 43year old female. Medication Follow-Up  Associated symptoms include a rash (psoriasis). Pertinent negatives include no abdominal pain, arthralgias, chest pain, chills, congestion, coughing, fatigue, fever, headaches, myalgias, nausea, sore throat, vomiting or weakness. Patient presents to follow up on medication  She takes klonopin prn nightly for difficulty sleeping  She is under a lot of stress with work and balancing different jobs/responsibilities  Some trouble falling/staying asleep  Joined gym 2-3 days/week and is trying to eat healthier  Working on weight loss  Walking regularly  Mood is good, no recent panic attacks    Due for physical and health maintenance    Sees derm for psoriasis mgmt, needs referral    History/Other:   Review of Systems   Constitutional:  Negative for chills, fatigue and fever. HENT:  Negative for congestion, ear pain, rhinorrhea and sore throat. Eyes:  Negative for visual disturbance. Respiratory:  Negative for cough, shortness of breath and wheezing. Cardiovascular:  Negative for chest pain, palpitations and leg swelling. Gastrointestinal:  Negative for abdominal pain, diarrhea, nausea and vomiting. Genitourinary:  Negative for dysuria, frequency and hematuria. Musculoskeletal:  Negative for arthralgias, gait problem and myalgias. Skin:  Positive for rash (psoriasis). Neurological:  Negative for weakness, light-headedness and headaches. Hematological:  Negative for adenopathy. Psychiatric/Behavioral:  Positive for sleep disturbance. Negative for dysphoric mood. The patient is not nervous/anxious. Current Outpatient Medications   Medication Sig Dispense Refill    lisdexamfetamine (VYVANSE) 50 MG Oral Cap Take 1 capsule (50 mg total) by mouth daily. 30 capsule 0    [START ON 7/15/2023] lisdexamfetamine (VYVANSE) 50 MG Oral Cap Take 1 capsule (50 mg total) by mouth daily.  30 capsule 0    [START ON 8/15/2023] lisdexamfetamine (VYVANSE) 50 MG Oral Cap Take 1 capsule (50 mg total) by mouth daily. 30 capsule 0    ergocalciferol 1.25 MG (44121 UT) Oral Cap Take 1 capsule (50,000 Units total) by mouth twice a week. With food for 12 weeks total then begin OTC Vitamin D at 2000 units daily with food thereafter 24 capsule 0    CLONAZEPAM 0.5 MG Oral Tab TAKE 1 TABLET(0.5 MG) BY MOUTH EVERY NIGHT AS NEEDED FOR ANXIETY 30 tablet 0    ketoconazole 2 % External Cream APPLY TOPICALLY UNDER BREAST TWICE DAILY AS NEEDED FOR RASH      clobetasol 0.05 % External Solution Apply to AA on scalp BID x2-4 weeks, then PRN 50 mL 1    albuterol 108 (90 Base) MCG/ACT Inhalation Aero Soln Inhale 2 puffs into the lungs every 6 (six) hours as needed for Wheezing. (Patient not taking: Reported on 7/7/2023) 6.7 g 0    FLUoxetine HCl 40 MG Oral Cap Take 1 capsule (40 mg total) by mouth daily. (Patient not taking: Reported on 7/7/2023) 30 capsule 2    clindamycin 1 % External Lotion APPLY EXTERNALLY TO FACE EVERY MORNING WITH FLARES (Patient not taking: Reported on 7/7/2023)      GABAPENTIN 300 MG Oral Cap TAKE 1 CAPSULE(300 MG) BY MOUTH THREE TIMES DAILY (Patient not taking: Reported on 7/7/2023) 90 capsule 0    pramipexole 1 MG Oral Tab Take 1 tablet (1 mg total) by mouth every evening. (Patient not taking: Reported on 7/7/2023) 90 tablet 3     Allergies:  Penicillins             HIVES  Sulfa Antibiotics       HIVES    Comment:    Objective:   Physical Exam  Vitals and nursing note reviewed. Constitutional:       Appearance: Normal appearance. Neurological:      Mental Status: She is alert and oriented to person, place, and time. Psychiatric:         Mood and Affect: Mood normal.         Behavior: Behavior normal.         Assessment & Plan:   1. Psoriasis  Referral for ongoing management. - DERM - INTERNAL    2. Visit for screening mammogram  - [facility] FREDY 2D+3D SCREENING BILAT (CPT=77067/19290); Future    3.  Difficulty sleeping  Stable overall. Cpm. Refills given. Follow up in a few months for physical.  - clonazePAM 0.5 MG Oral Tab; Take 1 tablet (0.5 mg total) by mouth nightly as needed. Dispense: 30 tablet;  Refill: 5

## 2023-07-10 ENCOUNTER — OFFICE VISIT (OUTPATIENT)
Dept: INTERNAL MEDICINE CLINIC | Facility: CLINIC | Age: 43
End: 2023-07-10
Payer: COMMERCIAL

## 2023-07-10 DIAGNOSIS — E66.9 OBESITY (BMI 35.0-39.9 WITHOUT COMORBIDITY): ICD-10-CM

## 2023-09-28 ENCOUNTER — LAB ENCOUNTER (OUTPATIENT)
Dept: LAB | Age: 43
End: 2023-09-28
Attending: INTERNAL MEDICINE
Payer: COMMERCIAL

## 2023-09-28 ENCOUNTER — OFFICE VISIT (OUTPATIENT)
Dept: INTERNAL MEDICINE CLINIC | Facility: CLINIC | Age: 43
End: 2023-09-28
Payer: COMMERCIAL

## 2023-09-28 VITALS
HEART RATE: 78 BPM | SYSTOLIC BLOOD PRESSURE: 118 MMHG | HEIGHT: 65 IN | BODY MASS INDEX: 36.49 KG/M2 | DIASTOLIC BLOOD PRESSURE: 70 MMHG | WEIGHT: 219 LBS | RESPIRATION RATE: 16 BRPM

## 2023-09-28 DIAGNOSIS — E66.01 MORBID (SEVERE) OBESITY DUE TO EXCESS CALORIES (HCC): ICD-10-CM

## 2023-09-28 DIAGNOSIS — Z51.81 THERAPEUTIC DRUG MONITORING: Primary | ICD-10-CM

## 2023-09-28 DIAGNOSIS — Z51.81 THERAPEUTIC DRUG MONITORING: ICD-10-CM

## 2023-09-28 LAB
EST. AVERAGE GLUCOSE BLD GHB EST-MCNC: 103 MG/DL (ref 68–126)
HBA1C MFR BLD: 5.2 % (ref ?–5.7)

## 2023-09-28 PROCEDURE — 3078F DIAST BP <80 MM HG: CPT | Performed by: INTERNAL MEDICINE

## 2023-09-28 PROCEDURE — 99214 OFFICE O/P EST MOD 30 MIN: CPT | Performed by: INTERNAL MEDICINE

## 2023-09-28 PROCEDURE — 83036 HEMOGLOBIN GLYCOSYLATED A1C: CPT

## 2023-09-28 PROCEDURE — 3008F BODY MASS INDEX DOCD: CPT | Performed by: INTERNAL MEDICINE

## 2023-09-28 PROCEDURE — 3074F SYST BP LT 130 MM HG: CPT | Performed by: INTERNAL MEDICINE

## 2023-09-28 PROCEDURE — 36415 COLL VENOUS BLD VENIPUNCTURE: CPT

## 2023-09-28 RX ORDER — LISDEXAMFETAMINE DIMESYLATE CAPSULES 50 MG/1
50 CAPSULE ORAL DAILY
Qty: 30 CAPSULE | Refills: 0 | Status: SHIPPED | OUTPATIENT
Start: 2023-10-29 | End: 2023-11-28

## 2023-09-28 RX ORDER — LISDEXAMFETAMINE DIMESYLATE CAPSULES 50 MG/1
50 CAPSULE ORAL DAILY
Qty: 30 CAPSULE | Refills: 0 | Status: SHIPPED | OUTPATIENT
Start: 2023-11-29 | End: 2023-12-29

## 2023-09-28 RX ORDER — LISDEXAMFETAMINE DIMESYLATE CAPSULES 50 MG/1
50 CAPSULE ORAL DAILY
Qty: 30 CAPSULE | Refills: 0 | Status: SHIPPED | OUTPATIENT
Start: 2023-09-28 | End: 2023-10-28

## 2023-09-28 RX ORDER — TOPIRAMATE 50 MG/1
50 TABLET, FILM COATED ORAL 2 TIMES DAILY
Qty: 180 TABLET | Refills: 0 | Status: SHIPPED | OUTPATIENT
Start: 2023-09-28 | End: 2024-09-22

## 2023-10-01 ENCOUNTER — APPOINTMENT (OUTPATIENT)
Dept: GENERAL RADIOLOGY | Facility: HOSPITAL | Age: 43
End: 2023-10-01
Attending: EMERGENCY MEDICINE

## 2023-10-01 ENCOUNTER — HOSPITAL ENCOUNTER (EMERGENCY)
Facility: HOSPITAL | Age: 43
Discharge: HOME | End: 2023-10-01
Attending: EMERGENCY MEDICINE

## 2023-10-01 VITALS
HEART RATE: 82 BPM | RESPIRATION RATE: 20 BRPM | DIASTOLIC BLOOD PRESSURE: 86 MMHG | SYSTOLIC BLOOD PRESSURE: 144 MMHG | OXYGEN SATURATION: 98 % | TEMPERATURE: 98 F

## 2023-10-01 DIAGNOSIS — R07.89 CHEST WALL PAIN: Primary | ICD-10-CM

## 2023-10-01 LAB
ALBUMIN SERPL-MCNC: 3.5 G/DL (ref 3.4–5)
ALBUMIN/GLOB SERPL: 0.9 {RATIO} (ref 1–2)
ALP LIVER SERPL-CCNC: 66 U/L
ALT SERPL-CCNC: 24 U/L
ANION GAP SERPL CALC-SCNC: 7 MMOL/L (ref 0–18)
AST SERPL-CCNC: 22 U/L (ref 15–37)
BASOPHILS # BLD AUTO: 0.05 X10(3) UL (ref 0–0.2)
BASOPHILS NFR BLD AUTO: 0.4 %
BILIRUB SERPL-MCNC: 0.3 MG/DL (ref 0.1–2)
BUN BLD-MCNC: 17 MG/DL (ref 7–18)
CALCIUM BLD-MCNC: 9 MG/DL (ref 8.5–10.1)
CHLORIDE SERPL-SCNC: 110 MMOL/L (ref 98–112)
CO2 SERPL-SCNC: 24 MMOL/L (ref 21–32)
CREAT BLD-MCNC: 0.7 MG/DL
D DIMER PPP FEU-MCNC: 0.33 UG/ML FEU (ref ?–0.5)
EGFRCR SERPLBLD CKD-EPI 2021: 111 ML/MIN/1.73M2 (ref 60–?)
EOSINOPHIL # BLD AUTO: 0.3 X10(3) UL (ref 0–0.7)
EOSINOPHIL NFR BLD AUTO: 2.7 %
ERYTHROCYTE [DISTWIDTH] IN BLOOD BY AUTOMATED COUNT: 12.3 %
GLOBULIN PLAS-MCNC: 3.9 G/DL (ref 2.8–4.4)
GLUCOSE BLD-MCNC: 87 MG/DL (ref 70–99)
HCT VFR BLD AUTO: 36.6 %
HGB BLD-MCNC: 13.2 G/DL
IMM GRANULOCYTES # BLD AUTO: 0.04 X10(3) UL (ref 0–1)
IMM GRANULOCYTES NFR BLD: 0.4 %
LYMPHOCYTES # BLD AUTO: 2.23 X10(3) UL (ref 1–4)
LYMPHOCYTES NFR BLD AUTO: 19.8 %
MCH RBC QN AUTO: 33 PG (ref 26–34)
MCHC RBC AUTO-ENTMCNC: 36.1 G/DL (ref 31–37)
MCV RBC AUTO: 91.5 FL
MONOCYTES # BLD AUTO: 0.56 X10(3) UL (ref 0.1–1)
MONOCYTES NFR BLD AUTO: 5 %
NEUTROPHILS # BLD AUTO: 8.11 X10 (3) UL (ref 1.5–7.7)
NEUTROPHILS # BLD AUTO: 8.11 X10(3) UL (ref 1.5–7.7)
NEUTROPHILS NFR BLD AUTO: 71.7 %
OSMOLALITY SERPL CALC.SUM OF ELEC: 293 MOSM/KG (ref 275–295)
PLATELET # BLD AUTO: 254 10(3)UL (ref 150–450)
POTASSIUM SERPL-SCNC: 3.9 MMOL/L (ref 3.5–5.1)
PROT SERPL-MCNC: 7.4 G/DL (ref 6.4–8.2)
RBC # BLD AUTO: 4 X10(6)UL
SODIUM SERPL-SCNC: 141 MMOL/L (ref 136–145)
TROPONIN I HIGH SENSITIVITY: 5 NG/L
WBC # BLD AUTO: 11.3 X10(3) UL (ref 4–11)

## 2023-10-01 PROCEDURE — 80053 COMPREHEN METABOLIC PANEL: CPT | Performed by: EMERGENCY MEDICINE

## 2023-10-01 PROCEDURE — 71045 X-RAY EXAM CHEST 1 VIEW: CPT | Performed by: EMERGENCY MEDICINE

## 2023-10-01 PROCEDURE — 85025 COMPLETE CBC W/AUTO DIFF WBC: CPT | Performed by: EMERGENCY MEDICINE

## 2023-10-01 PROCEDURE — 93005 ELECTROCARDIOGRAM TRACING: CPT

## 2023-10-01 PROCEDURE — 84484 ASSAY OF TROPONIN QUANT: CPT | Performed by: EMERGENCY MEDICINE

## 2023-10-01 PROCEDURE — 99285 EMERGENCY DEPT VISIT HI MDM: CPT

## 2023-10-01 PROCEDURE — 93010 ELECTROCARDIOGRAM REPORT: CPT

## 2023-10-01 PROCEDURE — 96374 THER/PROPH/DIAG INJ IV PUSH: CPT

## 2023-10-01 PROCEDURE — 85379 FIBRIN DEGRADATION QUANT: CPT | Performed by: EMERGENCY MEDICINE

## 2023-10-01 RX ORDER — KETOROLAC TROMETHAMINE 15 MG/ML
15 INJECTION, SOLUTION INTRAMUSCULAR; INTRAVENOUS ONCE
Status: COMPLETED | OUTPATIENT
Start: 2023-10-01 | End: 2023-10-01

## 2023-10-01 RX ORDER — NAPROXEN 500 MG/1
500 TABLET ORAL 2 TIMES DAILY PRN
Qty: 20 TABLET | Refills: 0 | Status: SHIPPED | OUTPATIENT
Start: 2023-10-01 | End: 2023-10-08

## 2023-10-01 NOTE — ED INITIAL ASSESSMENT (HPI)
Patient reports c/o upper back pain x 2 months, no known injury, rates pain 7 out of 10 on pain scale, worse with deep breaths and laying down.

## 2023-10-02 ENCOUNTER — PATIENT OUTREACH (OUTPATIENT)
Dept: CASE MANAGEMENT | Age: 43
End: 2023-10-02

## 2023-10-02 LAB
ATRIAL RATE: 93 BPM
P AXIS: 50 DEGREES
P-R INTERVAL: 162 MS
Q-T INTERVAL: 368 MS
QRS DURATION: 86 MS
QTC CALCULATION (BEZET): 457 MS
R AXIS: 30 DEGREES
T AXIS: 19 DEGREES
VENTRICULAR RATE: 93 BPM

## 2023-10-03 ENCOUNTER — HOSPITAL ENCOUNTER (OUTPATIENT)
Dept: MAMMOGRAPHY | Age: 43
Discharge: HOME | End: 2023-10-03
Attending: PHYSICIAN ASSISTANT
Payer: COMMERCIAL

## 2023-10-03 DIAGNOSIS — Z12.31 VISIT FOR SCREENING MAMMOGRAM: ICD-10-CM

## 2023-10-03 PROCEDURE — 77067 SCR MAMMO BI INCL CAD: CPT | Performed by: PHYSICIAN ASSISTANT

## 2023-10-03 PROCEDURE — 77063 BREAST TOMOSYNTHESIS BI: CPT | Performed by: PHYSICIAN ASSISTANT

## 2023-10-03 NOTE — PROGRESS NOTES
2nd attempt ER f/up apt request    Haroldo Hong  PCP  [address] [place] [address] [address]  Apt: Oct 10 @7am     Confirmed w/ pt  Closing encounter

## 2023-10-09 ENCOUNTER — HOSPITAL ENCOUNTER (OUTPATIENT)
Dept: GENERAL RADIOLOGY | Age: 43
Discharge: HOME | End: 2023-10-09
Attending: FAMILY MEDICINE
Payer: COMMERCIAL

## 2023-10-09 ENCOUNTER — LAB ENCOUNTER (OUTPATIENT)
Dept: LAB | Age: 43
End: 2023-10-09
Attending: FAMILY MEDICINE
Payer: COMMERCIAL

## 2023-10-09 ENCOUNTER — HOSPITAL ENCOUNTER (OUTPATIENT)
Dept: ULTRASOUND IMAGING | Age: 43
Discharge: HOME | End: 2023-10-09
Attending: FAMILY MEDICINE
Payer: COMMERCIAL

## 2023-10-09 ENCOUNTER — OFFICE VISIT (OUTPATIENT)
Dept: FAMILY MEDICINE CLINIC | Facility: CLINIC | Age: 43
End: 2023-10-09
Payer: COMMERCIAL

## 2023-10-09 VITALS
SYSTOLIC BLOOD PRESSURE: 126 MMHG | BODY MASS INDEX: 36.82 KG/M2 | RESPIRATION RATE: 16 BRPM | WEIGHT: 221 LBS | OXYGEN SATURATION: 98 % | HEART RATE: 78 BPM | DIASTOLIC BLOOD PRESSURE: 80 MMHG | HEIGHT: 65 IN

## 2023-10-09 DIAGNOSIS — R10.9 LEFT FLANK PAIN: ICD-10-CM

## 2023-10-09 DIAGNOSIS — R10.9 LEFT FLANK PAIN: Primary | ICD-10-CM

## 2023-10-09 LAB
ALBUMIN SERPL-MCNC: 3.7 G/DL (ref 3.4–5)
ALBUMIN/GLOB SERPL: 1.1 {RATIO} (ref 1–2)
ALP LIVER SERPL-CCNC: 60 U/L
ALT SERPL-CCNC: 22 U/L
ANION GAP SERPL CALC-SCNC: 6 MMOL/L (ref 0–18)
AST SERPL-CCNC: 11 U/L (ref 15–37)
BASOPHILS # BLD AUTO: 0.06 X10(3) UL (ref 0–0.2)
BASOPHILS NFR BLD AUTO: 0.7 %
BILIRUB SERPL-MCNC: 0.5 MG/DL (ref 0.1–2)
BILIRUBIN: NEGATIVE
BUN BLD-MCNC: 13 MG/DL (ref 7–18)
CALCIUM BLD-MCNC: 8.8 MG/DL (ref 8.5–10.1)
CHLORIDE SERPL-SCNC: 113 MMOL/L (ref 98–112)
CO2 SERPL-SCNC: 21 MMOL/L (ref 21–32)
CREAT BLD-MCNC: 0.72 MG/DL
EGFRCR SERPLBLD CKD-EPI 2021: 107 ML/MIN/1.73M2 (ref 60–?)
EOSINOPHIL # BLD AUTO: 0.26 X10(3) UL (ref 0–0.7)
EOSINOPHIL NFR BLD AUTO: 3.1 %
ERYTHROCYTE [DISTWIDTH] IN BLOOD BY AUTOMATED COUNT: 12.9 %
FASTING STATUS PATIENT QL REPORTED: YES
GLOBULIN PLAS-MCNC: 3.4 G/DL (ref 2.8–4.4)
GLUCOSE (URINE DIPSTICK): NEGATIVE MG/DL
GLUCOSE BLD-MCNC: 100 MG/DL (ref 70–99)
HCT VFR BLD AUTO: 40.4 %
HGB BLD-MCNC: 13.6 G/DL
IMM GRANULOCYTES # BLD AUTO: 0.04 X10(3) UL (ref 0–1)
IMM GRANULOCYTES NFR BLD: 0.5 %
KETONES (URINE DIPSTICK): NEGATIVE MG/DL
LEUKOCYTES: NEGATIVE
LIPASE SERPL-CCNC: 33 U/L (ref 13–75)
LYMPHOCYTES # BLD AUTO: 1.74 X10(3) UL (ref 1–4)
LYMPHOCYTES NFR BLD AUTO: 20.4 %
MCH RBC QN AUTO: 32.5 PG (ref 26–34)
MCHC RBC AUTO-ENTMCNC: 33.7 G/DL (ref 31–37)
MCV RBC AUTO: 96.7 FL
MONOCYTES # BLD AUTO: 0.35 X10(3) UL (ref 0.1–1)
MONOCYTES NFR BLD AUTO: 4.1 %
MULTISTIX LOT#: NORMAL NUMERIC
NEUTROPHILS # BLD AUTO: 6.07 X10 (3) UL (ref 1.5–7.7)
NEUTROPHILS # BLD AUTO: 6.07 X10(3) UL (ref 1.5–7.7)
NEUTROPHILS NFR BLD AUTO: 71.2 %
NITRITE, URINE: NEGATIVE
OCCULT BLOOD: NEGATIVE
OSMOLALITY SERPL CALC.SUM OF ELEC: 290 MOSM/KG (ref 275–295)
PH, URINE: 5.5 (ref 4.5–8)
PLATELET # BLD AUTO: 257 10(3)UL (ref 150–450)
POTASSIUM SERPL-SCNC: 4.2 MMOL/L (ref 3.5–5.1)
PROT SERPL-MCNC: 7.1 G/DL (ref 6.4–8.2)
PROTEIN (URINE DIPSTICK): NEGATIVE MG/DL
RBC # BLD AUTO: 4.18 X10(6)UL
SODIUM SERPL-SCNC: 140 MMOL/L (ref 136–145)
SPECIFIC GRAVITY: 1.02 (ref 1–1.03)
UROBILINOGEN,SEMI-QN: 0.2 MG/DL (ref 0–1.9)
WBC # BLD AUTO: 8.5 X10(3) UL (ref 4–11)

## 2023-10-09 PROCEDURE — 83690 ASSAY OF LIPASE: CPT

## 2023-10-09 PROCEDURE — 74018 RADEX ABDOMEN 1 VIEW: CPT | Performed by: FAMILY MEDICINE

## 2023-10-09 PROCEDURE — 80053 COMPREHEN METABOLIC PANEL: CPT

## 2023-10-09 PROCEDURE — 85025 COMPLETE CBC W/AUTO DIFF WBC: CPT

## 2023-10-09 PROCEDURE — 36415 COLL VENOUS BLD VENIPUNCTURE: CPT

## 2023-10-09 PROCEDURE — 76700 US EXAM ABDOM COMPLETE: CPT | Performed by: FAMILY MEDICINE

## 2023-10-09 PROCEDURE — 87086 URINE CULTURE/COLONY COUNT: CPT | Performed by: FAMILY MEDICINE

## 2023-10-11 ENCOUNTER — OFFICE VISIT (OUTPATIENT)
Dept: SURGERY | Facility: CLINIC | Age: 43
End: 2023-10-11

## 2023-10-11 DIAGNOSIS — R10.9 FLANK PAIN: ICD-10-CM

## 2023-10-11 DIAGNOSIS — N20.0 KIDNEY STONES: Primary | ICD-10-CM

## 2023-10-11 LAB
APPEARANCE: CLEAR
BILIRUBIN: NEGATIVE
GLUCOSE (URINE DIPSTICK): NEGATIVE MG/DL
KETONES (URINE DIPSTICK): NEGATIVE MG/DL
MULTISTIX LOT#: ABNORMAL NUMERIC
NITRITE, URINE: NEGATIVE
OCCULT BLOOD: NEGATIVE
PH, URINE: 6 (ref 4.5–8)
PROTEIN (URINE DIPSTICK): NEGATIVE MG/DL
SPECIFIC GRAVITY: 1.01 (ref 1–1.03)
URINE-COLOR: YELLOW
UROBILINOGEN,SEMI-QN: 0.2 MG/DL (ref 0–1.9)

## 2023-10-11 PROCEDURE — 99204 OFFICE O/P NEW MOD 45 MIN: CPT

## 2023-10-11 PROCEDURE — 81003 URINALYSIS AUTO W/O SCOPE: CPT

## 2023-10-11 RX ORDER — NAPROXEN 500 MG/1
500 TABLET ORAL 2 TIMES DAILY WITH MEALS
Qty: 30 TABLET | Refills: 0 | Status: SHIPPED | OUTPATIENT
Start: 2023-10-11

## 2023-10-11 NOTE — PROGRESS NOTES
[facility] [address], [address] [facility]    Urology Consult Note    History of Present Illness:   Patient is a(n) 43year old female with hx of anxiety, depression, JOSE and sjogrens who presents for kidney stone consult. Pt c/o 2 weeks of intermittent left flank pain with radiation into the left abdomen. Seems to be worse at night when she lays flat although constantly present. Naproxen, sitting and heating pads alleviates the pain mildly. Denies urinary sx such as frequency, urgency, dysuria, or hematuria. No hx of recurrent UTI. No fhx of  cx or stones. BM are regular although small in quantity recently. Had a fall a few wks ago and currently in PT for it. PT seems to exacerbate the back pain so she has stopped it since feeling back pain. Was not a good water drinker but since US results, she has stopped coffee and increased water intake significantly. US abd showed nonobstructing left renal stone 3mm. UA today small leuks.     HISTORY:  Past Medical History:   Diagnosis Date    Anxiety     Back pain     Back problem     Cervicalgia     Depression     Depressive disorder, not elsewhere classified     Dysmenorrhea     Dysphonia     Major depressive disorder, recurrent episode, mild (HCC)     Pain in joint, pelvic region and thigh     Palpitations     Post partum depression     hx w/1st pregnancy    Rhinitis, allergic     RLS (restless legs syndrome)     Sinusitis     Sjogren's disease (HCC)     Sleep apnea     cpap    [facility]     daytime    Urinary incontinence       Past Surgical History:   Procedure Laterality Date    BONE MARROW ASPIRATE &BIOPSY  08/05/2021    D & C      ORAL SURGERY PROCEDURE      wisdom tooth resection    OTHER  07/30/2020    ablation uterine    OTHER  06/20/2022    LEFT LUMBAR 4 - LUMBAR 5 FRAGMENTECTOMY AND MICRODISCECTOMY AND ALL INDICATED PROCEDURES      Family History   Problem Relation Age of Onset    Hypertension Mother     Hypertension Maternal Grandmother     Cancer Paternal Grandfather       Social History:   Social History     Socioeconomic History    Marital status:    Tobacco Use    Smoking status: Never    Smokeless tobacco: Never   Vaping Use    Vaping Use: Never used   Substance and Sexual Activity    Alcohol use: Not Currently    Drug use: No    Sexual activity: Not Currently   Other Topics Concern    Caffeine Concern Yes     Comment: coffee, tea, soda    Exercise Yes     Comment: 4 times per week    Seat Belt Yes   Social History Narrative    The patient does not use an assistive device. .      The patient does live in a home with stairs. Allergies    Penicillins             HIVES  Sulfa Antibiotics       HIVES    Comment:    Review of Systems:   A 10-point review of systems was completed and is negative other than as noted above.     Physical Exam:   LMP 09/28/2023 (Approximate)     GENERAL APPEARANCE: no acute distress  NEUROLOGIC: converses appropriately  HEAD: atraumatic, normocephalic  LUNGS: non-labored breathing  ABDOMEN: soft, nontender, non-distended  BACK: no CVA tenderness  PSYCH: appropriate affect and mood    Results:     Laboratory Data:  Lab Results   Component Value Date    WBC 8.5 10/09/2023    HGB 13.6 10/09/2023    .0 10/09/2023     Lab Results   Component Value Date     10/09/2023    K 4.2 10/09/2023     (H) 10/09/2023    CO2 21.0 10/09/2023    BUN 13 10/09/2023     (H) 10/09/2023    GFRAA 98 06/06/2022    AST 11 (L) 10/09/2023    ALT 22 10/09/2023    TP 7.1 10/09/2023    ALB 3.7 10/09/2023    CA 8.8 10/09/2023    MG 2.3 08/06/2021       Urinalysis Results (last 3 years):  Recent Labs     08/02/21  1110 08/05/21  1946 08/06/21  0914 02/16/22  1930 10/09/23  0802 10/11/23  0805   COLORUR  --  Yellow Yellow Yellow  --   --    CLARITY  --  Hazy* Hazy* Hazy*  --   --    SPECGRAVITY 1.020 1.015 1.021 1.023 1.025 1.010   PHURINE 7.5 5.0 5.0 5.0 5.5 6.0   PROUR  --  Negative Negative Negative  -- --    GLUUR  --  Negative Negative Negative  --   --    KETUR  --  Negative Negative Trace*  --   --    BILUR  --  Negative Negative Negative  --   --    BLOODURINE  --  Small* Small* Negative  --   --    NITRITE Negative Negative Negative Negative Negative Negative   UROBILINOGEN  --  2.0* 4.0* <2.0  --   --    LEUUR  --  Trace* Trace* Trace*  --   --    WBCUR  --  1-5 1-5 1-5  --   --    RBCUR  --  0-2 0-2 0-2  --   --    BACUR  --  Rare* Rare* Rare*  --   --        Urine Culture Results (last 3 years):  Lab Results   Component Value Date    URINECUL No Growth at 18-24 hrs. 10/09/2023    URINECUL No Growth at 18-24 hrs. 02/16/2022    URINECUL No Growth at 18-24 hrs. 08/02/2021    URINECUL No Growth at 18-24 hrs. 07/27/2020       Imaging  US ABDOMEN COMPLETE (CPT=76700)    Result Date: 10/9/2023  PROCEDURE:  US ABDOMEN COMPLETE (CPT=76700)  COMPARISON:  [facility] [place], US ABDOMEN COMPLETE (CPT=76700), 6/08/2022, 7:50 AM.  INDICATIONS:  R10.9 Left flank pain  TECHNIQUE:  Real time gray-scale ultrasound was used to evaluate the abdomen. The exam includes images of the liver, gallbladder, common bile duct, pancreas, spleen, kidneys, IVC, and aorta. PATIENT STATED HISTORY: (As transcribed by Technologist)  Left back / left upper abdominal pain and shortness of breath for 2-3 weeks. FINDINGS:  LIVER:  Diffuse hepatic steatosis is noted. BILIARY:  Polyp in the gallbladder is noted measuring up to 2 millimeters. The gallbladder wall is otherwise unremarkable. There is no intra or extrahepatic biliary ductal dilatation. Common bile duct diameter is 2 mm. PANCREAS:  The pancreas is obscured due to overlying bowel gas SPLEEN:  Normal. KIDNEYS:  Nonobstructing left renal calculus is noted measuring up to 3 millimeters. .  Right kidney measures 11.7 cm. Left kidney measures 13.8 cm. AORTA/IVC:  Normal.            CONCLUSION:  1.  Diffuse hepatic steatosis 2. 2 millimeter gallbladder wall polyp is noted which is not significantly changed from the prior exam. 3. Nonobstructing left renal calculus. LOCATION:  [place]    Dictated by (CST): Namrata Sparks MD on 10/09/2023 at 2:56 PM     Finalized by (CST): Namrata Sparks MD on 10/09/2023 at 3:03 PM       XR ABDOMEN (1 VIEW) (CPT=74018)    Result Date: 10/9/2023  PROCEDURE:  XR ABDOMEN (1 VIEW) (CPT=74018)  INDICATIONS:  R10.9 Left flank pain  COMPARISON:  None. TECHNIQUE:  Supine AP view was obtained. PATIENT STATED HISTORY: (As transcribed by Technologist)  Patient has left flank pain that radiates and wraps around to the left upper quadrant for about the past two weeks. She has pain with sitting and when laying down. She is unable to sleep on her stomach. FINDINGS:  There is stool and gas throughout nondilated colon with a relative paucity of small bowel gas. No abnormal calcifications overlie the renal beds. Osseous structures are grossly intact. CONCLUSION:  No acute radiographic abnormality in the abdomen. LOCATION:  [facility]   Dictated by (CST): Derrick Rodriguez MD on 10/09/2023 at 9:01 AM     Finalized by (CST): Derrick Rodriguez MD on 10/09/2023 at 9:03 AM       [facility] FREDY 2D+3D SCREENING BILAT (SWD=59378/33731)    Result Date: 10/3/2023  PROCEDURE:  [facility] FREDY 2D+3D SCREENING BILAT (CPT=77067/36531)  COMPARISON:  [address] [place] [address], , [facility] FREDY 2D+3D SCREENING BILAT (CPT=77067/09971), 11/23/2020, 9:41 AM.  [facility] , , [facility] FREDY 2D+3D DIAGNOSTIC ADDL VWS BILAT (IGC=28478/61200), 11/25/2020, 7:43 AM.  INDICATIONS:  Z12.31 Visit for screening mammogram  VIEWS OBTAINED:  Routine views of both breasts were obtained. Standard 2D and additional multiplane thin sections of the breasts were obtained for the purpose of Tomosynthesis evaluation. The images were reconstructed and reviewed on the dedicated Tomosynthesis workstation. BREAST COMPOSITION:   Heterogeneously dense, which may obscure small masses. FINDINGS:  Parenchymal pattern is unchanged.   There are no grouped microcalcifications, spiculated masses or areas of architectural distortion. Benign lymph node right axilla is noted. CONCLUSION:   BI-RADS CATEGORY:  DIAGNOSTIC CATEGORY 2--BENIGN FINDING:   RECOMMENDATIONS:  ROUTINE MAMMOGRAM AND CLINICAL EVALUATION IN 12 MONTHS. Because of breast density, this patient may benefit from supplemental screening with breast MRI, Molecular Breast Imaging (MBI) or bilateral whole breast ultrasound for increased sensitivity for detection of cancer which can be obscured mammographically. Please contact your ordering provider to discuss supplemental screening options. A letter explaining the results in lay terms has been sent to the patient. This exam was evaluated with a computer-aided device. This patient's information has been entered into a reminder system with a target due date for the next mammogram.   LOCATION:  [facility]   Dictated by (CST): Joel Pabon MD on 10/03/2023 at 10:30 AM     Finalized by (CST): Joel Pabon MD on 10/03/2023 at 10:35 AM       XR CHEST AP PORTABLE  (CPT=71045)    Result Date: 10/1/2023  PROCEDURE:  XR CHEST AP PORTABLE  (CPT=71045)  TECHNIQUE:  AP chest radiograph was obtained. COMPARISON:  [facility], XR, XR CHEST PA + LAT CHEST (MHK=[id number]), 1/02/2023, 10:13 AM.  INDICATIONS:  Increasing MAURILIO and pain in her back for over one month. PATIENT STATED HISTORY: (As transcribed by Technologist)  Shortness of breath and chest pain for about one month. FINDINGS:  Normal heart size and pulmonary vascularity. No pleural effusion or pneumothorax. No lobar consolidation. CONCLUSION:  No active cardiopulmonary process identified.    LOCATION:  [facility]      Dictated by (CST): Cliff Lovell MD on 10/01/2023 at 6:51 PM     Finalized by (CST): Cliff Lovell MD on 10/01/2023 at 6:52 PM           Impression:   Recommendations:  Flank pain  - discussed that based on US, XR and clean UA, this is more MSK in origin but will get CT stones to r/o obstructive etiology causing pain   - sent naproxen for pain management in the meantime and encouraged continued use of heating pads  - discussed stone prevention strategies and reviewed educational materials for this    A total of 30 minutes were spent on the visit including chart review in preparation for the visit, time with the patient, placing orders and communication with other providers where appropriate. Thank you very much for this consult. Please call if there are any questions or concerns.      Mally Mcnamara PA-C  Urology  [place] [address]  Phone: [phone]    Date: 10/11/2023  Time: 8:32 AM

## 2023-10-17 ENCOUNTER — HOSPITAL ENCOUNTER (OUTPATIENT)
Dept: CT IMAGING | Age: 43
Discharge: HOME | End: 2023-10-17
Payer: COMMERCIAL

## 2023-10-17 DIAGNOSIS — N20.0 KIDNEY STONES: ICD-10-CM

## 2023-10-17 PROCEDURE — 74176 CT ABD & PELVIS W/O CONTRAST: CPT

## 2023-11-17 DIAGNOSIS — G47.9 DIFFICULTY SLEEPING: ICD-10-CM

## 2023-11-17 RX ORDER — CLONAZEPAM 0.5 MG/1
0.5 TABLET ORAL NIGHTLY PRN
Qty: 30 TABLET | Refills: 5 | Status: SHIPPED | OUTPATIENT
Start: 2023-11-17

## 2023-11-17 NOTE — TELEPHONE ENCOUNTER
.A refill request was received for:  Requested Prescriptions     Pending Prescriptions Disp Refills    clonazePAM 0.5 MG Oral Tab 30 tablet 5     Sig: Take 1 tablet (0.5 mg total) by mouth nightly as needed.        Last refill date:   7/7/2023    Last office visit: 10/9/2023    Follow up due:  Future Appointments   Date Time Provider Osmin Esparza   12/4/2023  1:00 PM Tee Love PA-C [facility] [address]   12/4/2023  2:30 PM Randa Lefort, DO [facility]   2/22/2024 10:20 AM Trae Fitzgerald MD [facility]

## 2023-12-04 ENCOUNTER — OFFICE VISIT (OUTPATIENT)
Dept: FAMILY MEDICINE CLINIC | Facility: CLINIC | Age: 43
End: 2023-12-04
Payer: COMMERCIAL

## 2023-12-04 VITALS
WEIGHT: 225 LBS | OXYGEN SATURATION: 96 % | BODY MASS INDEX: 37.49 KG/M2 | HEART RATE: 69 BPM | HEIGHT: 65 IN | SYSTOLIC BLOOD PRESSURE: 136 MMHG | DIASTOLIC BLOOD PRESSURE: 78 MMHG | RESPIRATION RATE: 16 BRPM

## 2023-12-04 DIAGNOSIS — R05.8 PRODUCTIVE COUGH: ICD-10-CM

## 2023-12-04 DIAGNOSIS — R19.5 LOOSE STOOLS: Primary | ICD-10-CM

## 2023-12-04 DIAGNOSIS — R06.89 DECREASED BREATH SOUNDS IN LEFT MID-LUNG FIELD: ICD-10-CM

## 2023-12-04 DIAGNOSIS — R06.2 WHEEZING: ICD-10-CM

## 2023-12-04 PROCEDURE — 3075F SYST BP GE 130 - 139MM HG: CPT | Performed by: PHYSICIAN ASSISTANT

## 2023-12-04 PROCEDURE — 3008F BODY MASS INDEX DOCD: CPT | Performed by: PHYSICIAN ASSISTANT

## 2023-12-04 PROCEDURE — 99214 OFFICE O/P EST MOD 30 MIN: CPT | Performed by: PHYSICIAN ASSISTANT

## 2023-12-04 PROCEDURE — 3078F DIAST BP <80 MM HG: CPT | Performed by: PHYSICIAN ASSISTANT

## 2023-12-04 RX ORDER — CEFDINIR 300 MG/1
300 CAPSULE ORAL 2 TIMES DAILY
Qty: 14 CAPSULE | Refills: 0 | Status: SHIPPED | OUTPATIENT
Start: 2023-12-04 | End: 2023-12-11

## 2023-12-04 RX ORDER — ALBUTEROL SULFATE 90 UG/1
2 AEROSOL, METERED RESPIRATORY (INHALATION) EVERY 6 HOURS PRN
Qty: 1 EACH | Refills: 0 | Status: SHIPPED | OUTPATIENT
Start: 2023-12-04

## 2023-12-04 RX ORDER — CLOBETASOL PROPIONATE 0.5 MG/G
CREAM TOPICAL
COMMUNITY
Start: 2023-10-23

## 2023-12-04 RX ORDER — PREDNISONE 20 MG/1
40 TABLET ORAL DAILY
Qty: 10 TABLET | Refills: 0 | Status: SHIPPED | OUTPATIENT
Start: 2023-12-04 | End: 2023-12-09

## 2023-12-04 NOTE — PROGRESS NOTES
Subjective:   Patient ID: Tulio Garcia is a 37year old female. HPI  For the last month has not been having formed stools  No particular trigger  No new medications or supplements  Will be seeing GI today  Having daily bowel movements, comes out in pieces  Has seen blood in stool  Has h/o fissures and hemorrhoids  Has been straining and feels constipated  Sometimes stools are liquid like  Blood is bright red  No abdominal pain  Some pain in the back at night  She has constant bloating  Some nausea  Appetite is at baseline  No heartburn or acid reflux  No specific food triggers  This has never happened before  She does get constipated often    No family h/o GI disorders or liver issues  No family h/o colon cancer  Paternal grandmother with stomach cancer in later years    Drinking etoh every other day but can go months without drinking it  Diet: well balanced  Water intake could be better    She has had increased stress over the last few months  Having some issues at home and at her [employer] job    Was taking doxycycline for rosacea in August    Also c/o respiratory symptoms for over a month  Has had sick contacts  Her cough is productive of green/yellow sputum  She is exhausted  Symptoms not improving with otc medications  Chest is tight and congested  She has wheezing/sob  No fever/chills    History/Other:   Review of Systems   Constitutional:  Positive for fatigue. Negative for chills, diaphoresis and fever. HENT:  Positive for congestion, postnasal drip and rhinorrhea. Negative for ear pain, hearing loss, sinus pressure, sinus pain and sore throat. Respiratory:  Positive for cough, chest tightness, shortness of breath and wheezing. Cardiovascular:  Negative for chest pain and palpitations. Gastrointestinal:  Positive for abdominal distention, anal bleeding, blood in stool, diarrhea (loose, unformed stools) and nausea. Musculoskeletal:  Positive for back pain.  Negative for arthralgias and myalgias. Neurological:  Negative for dizziness, weakness, light-headedness and headaches. Psychiatric/Behavioral:  Negative for sleep disturbance. Current Outpatient Medications   Medication Sig Dispense Refill    clobetasol 0.05 % External Cream APPLY EXTERNALLY TO THE AFFECTED AREA OF PSORIASIS TWICE DAILY FOR UP TO 2 WEEKS PER MONTH AS NEEDED      clonazePAM 0.5 MG Oral Tab Take 1 tablet (0.5 mg total) by mouth nightly as needed. 30 tablet 5    naproxen 500 MG Oral Tab Take 1 tablet (500 mg total) by mouth 2 (two) times daily with meals. 30 tablet 0    topiramate (TOPAMAX) 50 MG Oral Tab Take 1 tablet (50 mg total) by mouth 2 (two) times daily. 180 tablet 0    lisdexamfetamine (VYVANSE) 50 MG Oral Cap Take 1 capsule (50 mg total) by mouth daily. 30 capsule 0    ergocalciferol 1.25 MG (21233 UT) Oral Cap Take 1 capsule (50,000 Units total) by mouth twice a week. With food for 12 weeks total then begin OTC Vitamin D at 2000 units daily with food thereafter 24 capsule 0    ketoconazole 2 % External Cream APPLY TOPICALLY UNDER BREAST TWICE DAILY AS NEEDED FOR RASH       Allergies: Allergies   Allergen Reactions    Penicillins HIVES    Sulfa Antibiotics HIVES              Objective:   Physical Exam  Vitals and nursing note reviewed. Constitutional:       Appearance: She is well-developed. HENT:      Head: Normocephalic and atraumatic. Right Ear: Tympanic membrane and external ear normal.      Left Ear: Tympanic membrane and external ear normal.      Nose: Mucosal edema and rhinorrhea present. Eyes:      Conjunctiva/sclera: Conjunctivae normal.      Pupils: Pupils are equal, round, and reactive to light. Cardiovascular:      Rate and Rhythm: Normal rate and regular rhythm. Heart sounds: Normal heart sounds. Pulmonary:      Effort: Pulmonary effort is normal.      Breath sounds: Decreased air movement present. Examination of the right-upper field reveals wheezing.  Examination of the left-upper field reveals wheezing. Examination of the right-middle field reveals wheezing. Examination of the left-middle field reveals decreased breath sounds and wheezing. Examination of the right-lower field reveals wheezing. Examination of the left-lower field reveals wheezing. Decreased breath sounds and wheezing present. No rales. Musculoskeletal:      Cervical back: Normal range of motion and neck supple. Lymphadenopathy:      Cervical: No cervical adenopathy. Skin:     General: Skin is warm and dry. Neurological:      Mental Status: She is alert. Assessment & Plan:   1. Loose stools  Etiology unclear especially given the duration of symptoms. Will check stool culture to assess for infection. No recent abx use making c diff unlikely. Given her diarrhea and blood in stool there is some concern for IBD. She will be seeing GI later today. - Stool Culture w/Shigatoxin [E]; Future    2. Productive cough  3. Decreased breath sounds in left mid-lung field  4. Wheezing  Given the duration and progression of her symptoms will empirically tx with abx and prednisone. Will also give albuterol inhaler to use prn wheezing. Continue otc medications and supportive care. Follow up in 2-3 days if symptoms worsen or do not start to improve. To ER if any symptoms become severe. - predniSONE 20 MG Oral Tab; Take 2 tablets (40 mg total) by mouth daily for 5 days. Dispense: 10 tablet; Refill: 0  - albuterol 108 (90 Base) MCG/ACT Inhalation Aero Soln; Inhale 2 puffs into the lungs every 6 (six) hours as needed for Wheezing. Dispense: 1 each; Refill: 0  - cefdinir 300 MG Oral Cap; Take 1 capsule (300 mg total) by mouth 2 (two) times daily for 7 days. Dispense: 14 capsule;  Refill: 0

## 2023-12-05 ENCOUNTER — LAB ENCOUNTER (OUTPATIENT)
Dept: LAB | Age: 43
End: 2023-12-05
Attending: STUDENT IN AN ORGANIZED HEALTH CARE EDUCATION/TRAINING PROGRAM
Payer: COMMERCIAL

## 2023-12-05 ENCOUNTER — LAB ENCOUNTER (OUTPATIENT)
Dept: LAB | Age: 43
End: 2023-12-05
Attending: PHYSICIAN ASSISTANT
Payer: COMMERCIAL

## 2023-12-05 DIAGNOSIS — R19.5 LOOSE STOOLS: ICD-10-CM

## 2023-12-05 DIAGNOSIS — R19.7 ACUTE DIARRHEA: ICD-10-CM

## 2023-12-05 LAB
CRYPTOSP AG STL QL IA: NEGATIVE
G LAMBLIA AG STL QL IA: NEGATIVE

## 2023-12-05 PROCEDURE — 87272 CRYPTOSPORIDIUM AG IF: CPT

## 2023-12-05 PROCEDURE — 83993 ASSAY FOR CALPROTECTIN FECAL: CPT

## 2023-12-05 PROCEDURE — 87427 SHIGA-LIKE TOXIN AG IA: CPT

## 2023-12-05 PROCEDURE — 87045 FECES CULTURE AEROBIC BACT: CPT

## 2023-12-05 PROCEDURE — 87046 STOOL CULTR AEROBIC BACT EA: CPT

## 2023-12-05 PROCEDURE — 87329 GIARDIA AG IA: CPT

## 2023-12-05 PROCEDURE — 87493 C DIFF AMPLIFIED PROBE: CPT

## 2023-12-06 LAB — C DIFF TOX B STL QL: NEGATIVE

## 2023-12-07 LAB — CALPROTECTIN STL-MCNT: 44.8 ΜG/G (ref ?–50)

## 2023-12-18 ENCOUNTER — HOSPITAL ENCOUNTER (OUTPATIENT)
Dept: GENERAL RADIOLOGY | Age: 43
Discharge: HOME | End: 2023-12-18
Attending: PHYSICIAN ASSISTANT
Payer: COMMERCIAL

## 2023-12-18 DIAGNOSIS — R05.9 COUGH, UNSPECIFIED TYPE: ICD-10-CM

## 2023-12-18 PROCEDURE — 71046 X-RAY EXAM CHEST 2 VIEWS: CPT | Performed by: PHYSICIAN ASSISTANT

## 2024-01-31 PROBLEM — R19.7 DIARRHEA: Status: ACTIVE | Noted: 2024-01-31

## 2024-01-31 PROBLEM — K92.1 HEMATOCHEZIA: Status: ACTIVE | Noted: 2024-01-31

## 2024-01-31 PROBLEM — D12.5 BENIGN NEOPLASM OF SIGMOID COLON: Status: ACTIVE | Noted: 2024-01-31

## 2024-01-31 PROBLEM — R11.0 NAUSEA: Status: ACTIVE | Noted: 2024-01-31

## 2024-01-31 PROCEDURE — 88305 TISSUE EXAM BY PATHOLOGIST: CPT | Performed by: STUDENT IN AN ORGANIZED HEALTH CARE EDUCATION/TRAINING PROGRAM

## 2024-03-27 PROBLEM — K25.9 GASTRIC ULCER: Status: ACTIVE | Noted: 2024-03-27

## 2024-03-27 PROCEDURE — 88305 TISSUE EXAM BY PATHOLOGIST: CPT | Performed by: STUDENT IN AN ORGANIZED HEALTH CARE EDUCATION/TRAINING PROGRAM

## 2024-04-18 ENCOUNTER — PATIENT MESSAGE (OUTPATIENT)
Dept: INTERNAL MEDICINE CLINIC | Facility: CLINIC | Age: 44
End: 2024-04-18

## 2024-04-18 NOTE — TELEPHONE ENCOUNTER
From: Alberta Ledezma  To: Stefany Maki  Sent: 4/18/2024 2:13 PM CDT  Subject: Needing some help    Hi, there. Since I have been unable to access the injections, circumstances have caused me to balloon up to the largest weight I've ever been. I'm so unhappy right now. I made an appointment to see you a while back, but you didn't have any openings until June so I did book you then. I'm not sure what to do. I'm taking the topiramate, but it doesn't feel like enough. I feel like I have no energy. When I went for my follow up visit for my endoscopy this morning my bp was 135/95 and I was half asleep because it was so early. And, I weighed 248lbs. I am just so upset by this. I try to eat right but I am just so stressed all the time with 2 jobs and 3 kids. I have very little time to work out, but walk at work when I can. Please let me know if we can even do an e-visit sometime soon. Thanks so much for your help.  Alberta

## 2024-04-18 NOTE — TELEPHONE ENCOUNTER
Last visit 9/28/23  Future Appointments   Date Time Provider Department Center   6/12/2024  9:40 AM Stefany Maki MD [facility]

## 2024-05-15 ENCOUNTER — TELEPHONE (OUTPATIENT)
Dept: INTERNAL MEDICINE CLINIC | Facility: CLINIC | Age: 44
End: 2024-05-15

## 2024-05-15 ENCOUNTER — OFFICE VISIT (OUTPATIENT)
Dept: INTERNAL MEDICINE CLINIC | Facility: CLINIC | Age: 44
End: 2024-05-15

## 2024-05-15 VITALS
WEIGHT: 249 LBS | SYSTOLIC BLOOD PRESSURE: 118 MMHG | HEART RATE: 76 BPM | HEIGHT: 65 IN | BODY MASS INDEX: 41.48 KG/M2 | RESPIRATION RATE: 16 BRPM | DIASTOLIC BLOOD PRESSURE: 80 MMHG

## 2024-05-15 DIAGNOSIS — E55.9 VITAMIN D DEFICIENCY: ICD-10-CM

## 2024-05-15 DIAGNOSIS — E66.01 MORBID (SEVERE) OBESITY DUE TO EXCESS CALORIES (HCC): ICD-10-CM

## 2024-05-15 DIAGNOSIS — G47.33 OBSTRUCTIVE SLEEP APNEA SYNDROME: ICD-10-CM

## 2024-05-15 DIAGNOSIS — E53.8 VITAMIN B12 DEFICIENCY: ICD-10-CM

## 2024-05-15 DIAGNOSIS — Z51.81 THERAPEUTIC DRUG MONITORING: Primary | ICD-10-CM

## 2024-05-15 PROCEDURE — 3008F BODY MASS INDEX DOCD: CPT | Performed by: INTERNAL MEDICINE

## 2024-05-15 PROCEDURE — 3074F SYST BP LT 130 MM HG: CPT | Performed by: INTERNAL MEDICINE

## 2024-05-15 PROCEDURE — 3079F DIAST BP 80-89 MM HG: CPT | Performed by: INTERNAL MEDICINE

## 2024-05-15 PROCEDURE — 99214 OFFICE O/P EST MOD 30 MIN: CPT | Performed by: INTERNAL MEDICINE

## 2024-05-15 NOTE — PROGRESS NOTES
HISTORY OF PRESENT ILLNESS  Chief Complaint   Patient presents with    Weight Check     Up 30 lb       Alberta Ledezma is a 43 year old female here for follow up in medical weight loss program.     Denies chest pain, shortness of breath, dizziness, blurred vision, headache, paresthesia, nausea/vomiting.   UP 30 lb from previous   Has not been able to get wegovy   Working 2 jobs   3 kids, 2 gong to college in the next few years.   Having more plantar fascitis / does walk in the summer.   Not eating throughout the day and starving by the afternoon  Second job does not get home till 10 .           Wt Readings from Last 6 Encounters:   05/15/24 249 lb (112.9 kg)   04/18/24 248 lb 6.4 oz (112.7 kg)   03/04/24 245 lb (111.1 kg)   01/09/24 240 lb (108.9 kg)   12/04/23 228 lb 12.8 oz (103.8 kg)   12/04/23 225 lb (102.1 kg)            Breakfast Lunch Dinner Snacks Fluids              REVIEW OF SYSTEMS  GENERAL HEALTH: feels well otherwise, denied any fevers chills or night sweats   RESPIRATORY: denies shortness of breath   CARDIOVASCULAR: denies chest pain  GI: denies abdominal pain    EXAM  /80   Pulse 76   Resp 16   Ht 5' 5\" (1.651 m)   Wt 249 lb (112.9 kg)   LMP 09/28/2023 (Approximate)   BMI 41.44 kg/m²   GENERAL: well developed, well nourished,in no apparent distress, A/O x3  SKIN: no rashes,no suspicious lesions  HEENT: atraumatic, normocephalic, OP-clear, PERRL  NECK: supple,no adenopathy  LUNGS: clear to auscultation bilaterally   CARDIO: RRR without murmur  GI: good BS's,NT/ND, no masses or HSM  EXTREMITIES: no cyanosis, no clubbing, no edema    Lab Results   Component Value Date    WBC 8.5 10/09/2023    RBC 4.18 10/09/2023    HGB 13.6 10/09/2023    HCT 40.4 10/09/2023    MCV 96.7 10/09/2023    MCH 32.5 10/09/2023    MCHC 33.7 10/09/2023    RDW 12.9 10/09/2023    .0 10/09/2023     Lab Results   Component Value Date     (H) 10/09/2023    BUN 13 10/09/2023    BUNCREA 19.4 05/05/2021     CREATSERUM 0.72 10/09/2023    ANIONGAP 6 10/09/2023    GFRNAA 85 06/06/2022    GFRAA 98 06/06/2022    CA 8.8 10/09/2023    OSMOCALC 290 10/09/2023    ALKPHO 60 10/09/2023    AST 11 (L) 10/09/2023    ALT 22 10/09/2023    BILT 0.5 10/09/2023    TP 7.1 10/09/2023    ALB 3.7 10/09/2023    GLOBULIN 3.4 10/09/2023    AGRATIO 1.9 10/21/2013     10/09/2023    K 4.2 10/09/2023     (H) 10/09/2023    CO2 21.0 10/09/2023     Lab Results   Component Value Date     09/28/2023    A1C 5.2 09/28/2023     Lab Results   Component Value Date    CHOLEST 179 08/05/2021    TRIG 124 08/05/2021    HDL 37 (L) 08/05/2021     (H) 08/05/2021    VLDL 22 08/05/2021    NONHDLC 142 (H) 08/05/2021     Lab Results   Component Value Date    T4F 0.8 02/13/2023    TSH 2.190 02/13/2023    TSHT4 0.78 10/21/2013     Lab Results   Component Value Date    B12 226 02/13/2023     Lab Results   Component Value Date    VITD 13.6 (L) 02/13/2023       Current Outpatient Medications on File Prior to Visit   Medication Sig Dispense Refill    clotrimazole-betamethasone 1-0.05 % External Cream Apply 1 Application topically 2 (two) times daily as needed. 45 g 1    doxycycline 100 MG Oral Cap TAKE 1 CAPSULE BY MOUTH TWICE DAILY. DO NOT LAY DOWN AN HOUR AFTER TAKING AND TAKE WITH FOOD AND WATER      clobetasol 0.05 % External Cream APPLY EXTERNALLY TO THE AFFECTED AREA OF PSORIASIS TWICE DAILY FOR UP TO 2 WEEKS PER MONTH AS NEEDED      albuterol 108 (90 Base) MCG/ACT Inhalation Aero Soln Inhale 2 puffs into the lungs every 6 (six) hours as needed for Wheezing. 1 each 0    clonazePAM 0.5 MG Oral Tab Take 1 tablet (0.5 mg total) by mouth nightly as needed. 30 tablet 5    topiramate (TOPAMAX) 50 MG Oral Tab Take 1 tablet (50 mg total) by mouth 2 (two) times daily. 180 tablet 0    ketoconazole 2 % External Cream APPLY TOPICALLY UNDER BREAST TWICE DAILY AS NEEDED FOR RASH       No current facility-administered medications on file prior to visit.        ASSESSMENT  Analyzed weight data:       Diagnoses and all orders for this visit:    Therapeutic drug monitoring  -     Hemoglobin A1C; Future  -     Comp Metabolic Panel (14); Future  -     B12 AND FOLATE; Future  -     Vitamin D; Future  -     TSH and Free T4; Future  -     Lipid Panel; Future  -     Sleep Medicine - [facility]  -     semaglutide-weight management 0.5 MG/0.5ML Subcutaneous Solution Auto-injector; Inject 0.5 mL (0.5 mg total) into the skin once a week for 4 doses.    Morbid (severe) obesity due to excess calories (HCC)  -     Hemoglobin A1C; Future  -     Comp Metabolic Panel (14); Future  -     B12 AND FOLATE; Future  -     Vitamin D; Future  -     TSH and Free T4; Future  -     Lipid Panel; Future  -     Sleep Medicine - [facility]  -     semaglutide-weight management 0.5 MG/0.5ML Subcutaneous Solution Auto-injector; Inject 0.5 mL (0.5 mg total) into the skin once a week for 4 doses.    Vitamin B12 deficiency  -     Hemoglobin A1C; Future  -     Comp Metabolic Panel (14); Future  -     B12 AND FOLATE; Future  -     Vitamin D; Future  -     TSH and Free T4; Future  -     Lipid Panel; Future  -     Sleep Medicine - [facility]  -     semaglutide-weight management 0.5 MG/0.5ML Subcutaneous Solution Auto-injector; Inject 0.5 mL (0.5 mg total) into the skin once a week for 4 doses.    Obstructive sleep apnea syndrome  -     Hemoglobin A1C; Future  -     Comp Metabolic Panel (14); Future  -     B12 AND FOLATE; Future  -     Vitamin D; Future  -     TSH and Free T4; Future  -     Lipid Panel; Future  -     Sleep Medicine - [facility]  -     semaglutide-weight management 0.5 MG/0.5ML Subcutaneous Solution Auto-injector; Inject 0.5 mL (0.5 mg total) into the skin once a week for 4 doses.    Vitamin D deficiency  -     Hemoglobin A1C; Future  -     Comp Metabolic Panel (14); Future  -     B12 AND FOLATE; Future  -     Vitamin D; Future  -     TSH and  Free T4; Future  -     Lipid Panel; Future  -     Sleep Medicine - [facility]  -     semaglutide-weight management 0.5 MG/0.5ML Subcutaneous Solution Auto-injector; Inject 0.5 mL (0.5 mg total) into the skin once a week for 4 doses.    Other orders  -     semaglutide-weight management 0.25 MG/0.5ML Subcutaneous Solution Auto-injector; Inject 0.5 mL (0.25 mg total) into the skin once a week for 4 doses.  -     semaglutide-weight management 0.25 MG/0.5ML Subcutaneous Solution Auto-injector; Inject 0.5 mL (0.25 mg total) into the skin once a week for 4 doses.  -     semaglutide-weight management 1 MG/0.5ML Subcutaneous Solution Auto-injector; Inject 0.5 mL (1 mg total) into the skin once a week for 4 doses.            PLAN  Initial consult: 1/6/23: 235 lb   Reconsult : 249 lb on 5/15/24   Total time spent on chart review, pre-charting, obtaining history, counseling, and educating, reviewing labs was 30 minutes.   Was unable to find wegovy last year   Here for restart   Following lifestyle changes  Reviewed increased protein   Recheck labwork   Referral to sleep center to re-establish, [facility] currently not treatment   Struggling with hunger andappetite   Tried phentermine/ qysmia in 2022 with no further weight loss   Cannot take contrave due to regular alcohol intake  Nutrition: low carb diet/ recommended to eat breakfast daily/ regular protein intake  Medication use and side effects reviewed with patient.  Medication contraindications: n/a   Follow up with dietitian and psychologist as recommended.  Discussed the role of sleep and stress in weight management.  Counseled on comprehensive weight loss plan including attention to nutrition, exercise and behavior/stress management for success. See patient instruction below for more details.  Discussed strategies to overcome barriers to successful weight loss and weight maintenance  FITTE: ACSM recommendations (150-300 minutes/ week in active weight loss)   Weight  Loss consent to treat reviewed and signed   There are no Patient Instructions on file for this visit.    No follow-ups on file.    Patient verbalizes understanding.    Stefany Maki MD

## 2024-05-16 ENCOUNTER — LAB ENCOUNTER (OUTPATIENT)
Dept: LAB | Age: 44
End: 2024-05-16
Attending: INTERNAL MEDICINE

## 2024-05-16 DIAGNOSIS — Z51.81 THERAPEUTIC DRUG MONITORING: ICD-10-CM

## 2024-05-16 DIAGNOSIS — G47.33 OBSTRUCTIVE SLEEP APNEA SYNDROME: ICD-10-CM

## 2024-05-16 DIAGNOSIS — E55.9 VITAMIN D DEFICIENCY: ICD-10-CM

## 2024-05-16 DIAGNOSIS — E66.01 MORBID (SEVERE) OBESITY DUE TO EXCESS CALORIES (HCC): ICD-10-CM

## 2024-05-16 DIAGNOSIS — E53.8 VITAMIN B12 DEFICIENCY: ICD-10-CM

## 2024-05-16 LAB
ALBUMIN SERPL-MCNC: 3.7 G/DL (ref 3.4–5)
ALBUMIN/GLOB SERPL: 1 {RATIO} (ref 1–2)
ALP LIVER SERPL-CCNC: 62 U/L
ALT SERPL-CCNC: 27 U/L
ANION GAP SERPL CALC-SCNC: 6 MMOL/L (ref 0–18)
AST SERPL-CCNC: 15 U/L (ref 15–37)
BILIRUB SERPL-MCNC: 0.4 MG/DL (ref 0.1–2)
BUN BLD-MCNC: 14 MG/DL (ref 9–23)
CALCIUM BLD-MCNC: 8.6 MG/DL (ref 8.5–10.1)
CHLORIDE SERPL-SCNC: 110 MMOL/L (ref 98–112)
CHOLEST SERPL-MCNC: 189 MG/DL (ref ?–200)
CO2 SERPL-SCNC: 23 MMOL/L (ref 21–32)
CREAT BLD-MCNC: 0.9 MG/DL
EGFRCR SERPLBLD CKD-EPI 2021: 81 ML/MIN/1.73M2 (ref 60–?)
EST. AVERAGE GLUCOSE BLD GHB EST-MCNC: 103 MG/DL (ref 68–126)
FASTING PATIENT LIPID ANSWER: YES
FASTING STATUS PATIENT QL REPORTED: YES
FOLATE SERPL-MCNC: 11.3 NG/ML (ref 8.7–?)
GLOBULIN PLAS-MCNC: 3.6 G/DL (ref 2.8–4.4)
GLUCOSE BLD-MCNC: 109 MG/DL (ref 70–99)
HBA1C MFR BLD: 5.2 % (ref ?–5.7)
HDLC SERPL-MCNC: 36 MG/DL (ref 40–59)
LDLC SERPL CALC-MCNC: 108 MG/DL (ref ?–100)
NONHDLC SERPL-MCNC: 153 MG/DL (ref ?–130)
OSMOLALITY SERPL CALC.SUM OF ELEC: 289 MOSM/KG (ref 275–295)
POTASSIUM SERPL-SCNC: 4.5 MMOL/L (ref 3.5–5.1)
PROT SERPL-MCNC: 7.3 G/DL (ref 6.4–8.2)
SODIUM SERPL-SCNC: 139 MMOL/L (ref 136–145)
T4 FREE SERPL-MCNC: 0.9 NG/DL (ref 0.8–1.7)
TRIGL SERPL-MCNC: 260 MG/DL (ref 30–149)
TSI SER-ACNC: 1.86 MIU/ML (ref 0.36–3.74)
VIT B12 SERPL-MCNC: 251 PG/ML (ref 193–986)
VIT D+METAB SERPL-MCNC: 17.2 NG/ML (ref 30–100)
VLDLC SERPL CALC-MCNC: 45 MG/DL (ref 0–30)

## 2024-05-16 PROCEDURE — 84443 ASSAY THYROID STIM HORMONE: CPT

## 2024-05-16 PROCEDURE — 80053 COMPREHEN METABOLIC PANEL: CPT

## 2024-05-16 PROCEDURE — 82607 VITAMIN B-12: CPT

## 2024-05-16 PROCEDURE — 83036 HEMOGLOBIN GLYCOSYLATED A1C: CPT

## 2024-05-16 PROCEDURE — 36415 COLL VENOUS BLD VENIPUNCTURE: CPT

## 2024-05-16 PROCEDURE — 84439 ASSAY OF FREE THYROXINE: CPT

## 2024-05-16 PROCEDURE — 80061 LIPID PANEL: CPT

## 2024-05-16 PROCEDURE — 82306 VITAMIN D 25 HYDROXY: CPT

## 2024-05-16 PROCEDURE — 82746 ASSAY OF FOLIC ACID SERUM: CPT

## 2024-05-20 ENCOUNTER — PATIENT MESSAGE (OUTPATIENT)
Dept: INTERNAL MEDICINE CLINIC | Facility: CLINIC | Age: 44
End: 2024-05-20

## 2024-05-20 RX ORDER — ERGOCALCIFEROL 1.25 MG/1
50000 CAPSULE ORAL WEEKLY
Qty: 12 CAPSULE | Refills: 0 | Status: SHIPPED | OUTPATIENT
Start: 2024-05-20

## 2024-05-21 NOTE — TELEPHONE ENCOUNTER
From: Alberta Ledezma  To: Stefany Maki  Sent: 5/20/2024 5:09 PM CDT  Subject: B12    Hi there. I read the notes she had about my test results. I will do the B12 injections in the office. I also wanted to let you know that I still have not received the wegovy injections from the [facility]. The last time I spoke to them they said they were waiting for prior auth.

## 2024-05-21 NOTE — TELEPHONE ENCOUNTER
Prior Authorization History  semaglutide-weight management 0.5 MG/0.5ML Subcutaneous Solution Auto-injector     Approval Details    Authorized from April 19, 2024 to May 19, 2025  Information received electronically from payer

## 2024-05-22 NOTE — TELEPHONE ENCOUNTER
Approved    Prior authorization approved  Payer: [employer] [facility] Case ID: [id number]    [phone]  Note from payer: The case has been Approved from  [id number] to [id number]  Approval Details    Authorized from April 19, 2024 to May 19, 2025  Electronic appeal: Not supported  View History  Medication Being Authorized    semaglutide-weight management 0.5 MG/0.5ML Subcutaneous Solution Auto-injector  Inject 0.5 mL (0.5 mg total) into the skin once a week for 4 doses.  Dispense: 2 mL Refills: 0   Start: 5/15/2024 End: 6/6/2024   Class: Print Script Diagnoses: Therapeutic drug monitoring, Morbid (severe) obesity due to excess calories (HCC), Vitamin B12 deficiency, Obstructive sleep apnea syndrome, Vitamin D deficiency   This order has been released to its destination.

## 2024-05-29 ENCOUNTER — TELEPHONE (OUTPATIENT)
Dept: INTERNAL MEDICINE CLINIC | Facility: CLINIC | Age: 44
End: 2024-05-29

## 2024-06-05 ENCOUNTER — OFFICE VISIT (OUTPATIENT)
Dept: FAMILY MEDICINE CLINIC | Facility: CLINIC | Age: 44
End: 2024-06-05
Payer: COMMERCIAL

## 2024-06-05 VITALS
BODY MASS INDEX: 41.32 KG/M2 | OXYGEN SATURATION: 99 % | WEIGHT: 248 LBS | DIASTOLIC BLOOD PRESSURE: 86 MMHG | HEART RATE: 83 BPM | HEIGHT: 65 IN | SYSTOLIC BLOOD PRESSURE: 120 MMHG

## 2024-06-05 DIAGNOSIS — Z12.31 VISIT FOR SCREENING MAMMOGRAM: ICD-10-CM

## 2024-06-05 DIAGNOSIS — R00.2 PALPITATIONS: ICD-10-CM

## 2024-06-05 DIAGNOSIS — G35 MS (MULTIPLE SCLEROSIS) (HCC): ICD-10-CM

## 2024-06-05 DIAGNOSIS — M79.10 MUSCLE PAIN: ICD-10-CM

## 2024-06-05 DIAGNOSIS — Z82.49 FAMILY HISTORY OF CARDIAC ARRHYTHMIA: ICD-10-CM

## 2024-06-05 DIAGNOSIS — R15.9 INCONTINENCE OF FECES, UNSPECIFIED FECAL INCONTINENCE TYPE: ICD-10-CM

## 2024-06-05 DIAGNOSIS — G47.33 OSA (OBSTRUCTIVE SLEEP APNEA): ICD-10-CM

## 2024-06-05 DIAGNOSIS — L71.9 ROSACEA: ICD-10-CM

## 2024-06-05 DIAGNOSIS — G47.9 DIFFICULTY SLEEPING: ICD-10-CM

## 2024-06-05 DIAGNOSIS — Z00.00 ROUTINE GENERAL MEDICAL EXAMINATION AT A HEALTH CARE FACILITY: Primary | ICD-10-CM

## 2024-06-05 DIAGNOSIS — E66.01 CLASS 3 SEVERE OBESITY DUE TO EXCESS CALORIES WITH SERIOUS COMORBIDITY AND BODY MASS INDEX (BMI) OF 40.0 TO 44.9 IN ADULT (HCC): ICD-10-CM

## 2024-06-05 DIAGNOSIS — N39.498 OTHER URINARY INCONTINENCE: ICD-10-CM

## 2024-06-05 DIAGNOSIS — H53.9 VISION CHANGES: ICD-10-CM

## 2024-06-05 PROCEDURE — 3079F DIAST BP 80-89 MM HG: CPT | Performed by: PHYSICIAN ASSISTANT

## 2024-06-05 PROCEDURE — 99396 PREV VISIT EST AGE 40-64: CPT | Performed by: PHYSICIAN ASSISTANT

## 2024-06-05 PROCEDURE — 99214 OFFICE O/P EST MOD 30 MIN: CPT | Performed by: PHYSICIAN ASSISTANT

## 2024-06-05 PROCEDURE — 3008F BODY MASS INDEX DOCD: CPT | Performed by: PHYSICIAN ASSISTANT

## 2024-06-05 PROCEDURE — 3074F SYST BP LT 130 MM HG: CPT | Performed by: PHYSICIAN ASSISTANT

## 2024-06-05 RX ORDER — CLONAZEPAM 0.5 MG/1
0.5 TABLET ORAL NIGHTLY PRN
Qty: 30 TABLET | Refills: 5 | Status: SHIPPED | OUTPATIENT
Start: 2024-06-05

## 2024-06-05 NOTE — PROGRESS NOTES
Subjective:   Patient ID: Alberta Ledezma is a 43 year old female.    HPI  Patient presents today requesting physical exam.      Diet: eating healthier  Exercise: regular activity   Water intake could be better - 3-4 glasses right now  Tobacco use: denies  Drug use: denies  Alcohol use: occasionally  Sexually active: with spouse  LMP: regular menstrual cycles, h/o ablation 2020  Marital status:   Occupation: 2 jobs still    Health maintenance:  Pap/Hpv: 1/20/21 normal  Mammogram: 10/3/23 stable  Performs SBEs: yes  Dexa scan: never  Colonoscopy: 2024 benign, recall 10 years  Discussed age appropriate vaccines    Having muscle cramping like a jessica horse - ongoing for a year or so  Has felt this in her back and all the way down the legs  Mostly happens in the legs  Triggered by driving or twisting her back a certain way  Not going away over time    Upon further questions she also has symptoms including:  Urinary incontinence  Leakage of stool - has had episode where she did not feel the urge to defecate but soiled herself  Vertigo  Paresthesias/burning sensation  Vision problems, has recently had abnormal testing at her optometry office    She has seen bariatrics  On wegovy 0.25 mg right now  She is walking daily 2-3 miles  She has no energy  Sleep: at most 6 hours/night and waking 1-2 times, usually 4-5 hours   She wonders what else to do to help with weight loss and energy  She did very well on phentermine in the past.     Also c/o increased HR and palpitations  Her father has h/o abnormal rhythm and underwent ablation  Her sister also has h/o abnormal rhythm      History/Other:   Review of Systems   Constitutional:  Positive for fatigue. Negative for chills and fever.   HENT:  Negative for congestion, ear pain, rhinorrhea and sore throat.    Eyes:  Positive for visual disturbance.   Respiratory:  Negative for cough, shortness of breath and wheezing.    Cardiovascular:  Negative for chest pain,  palpitations and leg swelling.   Gastrointestinal:  Negative for abdominal pain, diarrhea, nausea and vomiting.   Genitourinary:  Negative for dysuria, frequency and hematuria.        Incontinent of stool and urine   Musculoskeletal:  Positive for back pain and myalgias. Negative for arthralgias and gait problem.   Skin:  Negative for rash.   Neurological:  Positive for dizziness. Negative for weakness, light-headedness and headaches.   Hematological:  Negative for adenopathy.   Psychiatric/Behavioral:  Negative for dysphoric mood. The patient is not nervous/anxious.      Current Outpatient Medications   Medication Sig Dispense Refill    ergocalciferol 1.25 MG (07605 UT) Oral Cap Take 1 capsule (50,000 Units total) by mouth once a week. Once complete switch to over-the-counter vitamin D supplement of 2000 units daily. 12 capsule 0    semaglutide-weight management 0.25 MG/0.5ML Subcutaneous Solution Auto-injector Inject 0.5 mL (0.25 mg total) into the skin once a week for 4 doses. 2 mL 0    semaglutide-weight management 0.5 MG/0.5ML Subcutaneous Solution Auto-injector Inject 0.5 mL (0.5 mg total) into the skin once a week for 4 doses. 2 mL 0    semaglutide-weight management 1 MG/0.5ML Subcutaneous Solution Auto-injector Inject 0.5 mL (1 mg total) into the skin once a week for 4 doses. 2 mL 0    clotrimazole-betamethasone 1-0.05 % External Cream Apply 1 Application topically 2 (two) times daily as needed. 45 g 1    clobetasol 0.05 % External Cream APPLY EXTERNALLY TO THE AFFECTED AREA OF PSORIASIS TWICE DAILY FOR UP TO 2 WEEKS PER MONTH AS NEEDED      albuterol 108 (90 Base) MCG/ACT Inhalation Aero Soln Inhale 2 puffs into the lungs every 6 (six) hours as needed for Wheezing. 1 each 0    clonazePAM 0.5 MG Oral Tab Take 1 tablet (0.5 mg total) by mouth nightly as needed. 30 tablet 5    topiramate (TOPAMAX) 50 MG Oral Tab Take 1 tablet (50 mg total) by mouth 2 (two) times daily. 180 tablet 0    ketoconazole 2 % External  Cream APPLY TOPICALLY UNDER BREAST TWICE DAILY AS NEEDED FOR RASH       Allergies:  Allergies   Allergen Reactions    Penicillins HIVES    Sulfa Antibiotics HIVES              Objective:   Physical Exam  Vitals and nursing note reviewed.   Constitutional:       General: She is not in acute distress.     Appearance: Normal appearance. She is well-developed.   HENT:      Head: Normocephalic and atraumatic.      Right Ear: Tympanic membrane, ear canal and external ear normal.      Left Ear: Tympanic membrane, ear canal and external ear normal.      Nose: Nose normal.      Mouth/Throat:      Mouth: Mucous membranes are moist.   Eyes:      Extraocular Movements: Extraocular movements intact.      Conjunctiva/sclera: Conjunctivae normal.      Pupils: Pupils are equal, round, and reactive to light.   Neck:      Thyroid: No thyromegaly.   Cardiovascular:      Rate and Rhythm: Normal rate and regular rhythm.      Pulses: Normal pulses.      Heart sounds: Normal heart sounds.   Pulmonary:      Effort: Pulmonary effort is normal.      Breath sounds: Normal breath sounds. No wheezing or rales.   Abdominal:      General: Bowel sounds are normal. There is no distension.      Palpations: Abdomen is soft. There is no mass.      Tenderness: There is no abdominal tenderness.   Musculoskeletal:         General: No tenderness. Normal range of motion.      Cervical back: Normal range of motion and neck supple.   Lymphadenopathy:      Cervical: No cervical adenopathy.   Skin:     General: Skin is warm and dry.      Findings: No rash.   Neurological:      General: No focal deficit present.      Mental Status: She is alert and oriented to person, place, and time.   Psychiatric:         Mood and Affect: Mood normal.         Behavior: Behavior normal.         Assessment & Plan:   1. Routine general medical examination at a health care facility  Patient here with active issues below. Fasting labs reviewed.  Health maintenance issues  discussed. Encouraged routine vaccines.  Advised healthy diet and regular exercise.  Annual physicals.    2. JOSE (obstructive sleep apnea)  Uncontrolled and likely related to her profound fatigue and lack of energy. Recommend consulting with pulm to obtain oral appliance. She could not tolerate CPAP previously.  - Pulmonary Referral - [facility]    3. Class 3 severe obesity due to excess calories with serious comorbidity and body mass index (BMI) of 40.0 to 44.9 in adult ([facility])  Patient undergoing treatment in the weight loss clinic. Needs new referral. She would like to consider adding phentermine back in as this was tolerated well and effective.   - [facility] - Stefany Maki MD [address], [address]    4. Rosacea  - Derm Referral - External    5. MS (multiple sclerosis) ([facility])  6. Other urinary incontinence  7. Incontinence of feces, unspecified fecal incontinence type  8. Muscle pain  9. Vision changes  Patient has multiple symptoms concerning for MS as well as personal h/o autoimmune disorders. Will check MRI brain to rule out and evaluate for other pathology to explain her symptoms. If normal will work up her symptoms individually.   - [facility] MRI BRAIN (W+WO) (CPT=70553); Future  - z [facility] MRI BRAIN (W+WO) (CPT=70553)    10. Visit for screening mammogram  - [facility] 2D+3D SCREENING BILAT (CPT=77067/57988); Future    11. Difficulty sleeping  Stable. Cpm. Refills given.  - clonazePAM 0.5 MG Oral Tab; Take 1 tablet (0.5 mg total) by mouth nightly as needed.  Dispense: 30 tablet; Refill: 5    12. Palpitations  Patient's sister and father both have h/o cardiac arrhythmias. She has been having increased HR and palpitations often. Check zio patch and follow up pending results. Also discussed CT calcium heart scan. Refer to cardio of needed.   - [facility] ZIO EXTENDED MONITOR 8-14 DAYS (CPT=66059/61097); Future    13. Family history of cardiac arrhythmia  As above.   - CARD ZIO  EXTENDED MONITOR 8-14 DAYS (CPT=99339/79635); Future

## 2024-06-07 ENCOUNTER — HOSPITAL ENCOUNTER (OUTPATIENT)
Dept: CV DIAGNOSTICS | Facility: HOSPITAL | Age: 44
Discharge: HOME | End: 2024-06-07
Attending: PHYSICIAN ASSISTANT
Payer: COMMERCIAL

## 2024-06-07 DIAGNOSIS — Z82.49 FAMILY HISTORY OF CARDIAC ARRHYTHMIA: ICD-10-CM

## 2024-06-07 DIAGNOSIS — R00.2 PALPITATIONS: ICD-10-CM

## 2024-06-07 PROCEDURE — 93247 EXT ECG>7D<15D SCAN A/R: CPT | Performed by: PHYSICIAN ASSISTANT

## 2024-06-07 PROCEDURE — 93246 EXT ECG>7D<15D RECORDING: CPT | Performed by: PHYSICIAN ASSISTANT

## 2024-06-10 ENCOUNTER — NURSE ONLY (OUTPATIENT)
Dept: INTERNAL MEDICINE CLINIC | Facility: CLINIC | Age: 44
End: 2024-06-10
Payer: COMMERCIAL

## 2024-06-10 DIAGNOSIS — E53.8 VITAMIN B12 DEFICIENCY: Primary | ICD-10-CM

## 2024-06-11 PROCEDURE — 96372 THER/PROPH/DIAG INJ SC/IM: CPT | Performed by: INTERNAL MEDICINE

## 2024-06-11 RX ORDER — CYANOCOBALAMIN 1000 UG/ML
1000 INJECTION, SOLUTION INTRAMUSCULAR; SUBCUTANEOUS ONCE
Status: COMPLETED | OUTPATIENT
Start: 2024-06-11 | End: 2024-06-11

## 2024-06-11 RX ADMIN — CYANOCOBALAMIN 1000 MCG: 1000 INJECTION, SOLUTION INTRAMUSCULAR; SUBCUTANEOUS at 17:04:00

## 2024-06-14 NOTE — TELEPHONE ENCOUNTER
Your prior authorization for Wegovy has been approved!  MORE INFO  Personalized support and financial assistance may be available through the 's [employer] program. For more information, and to see program requirements, click on the More Info button to the right.    Message from plan: Prior authorization in place - if requesting an increased quantity, a quantity limit review can be submitted via fax    CARLYN LOZADA (Key: [id number])  Rx #: [id number]  Wegovy 0.5MG/0.5ML auto-injectors  Form  [facility]

## 2024-06-26 ENCOUNTER — PATIENT MESSAGE (OUTPATIENT)
Dept: INTERNAL MEDICINE CLINIC | Facility: CLINIC | Age: 44
End: 2024-06-26

## 2024-06-26 DIAGNOSIS — E66.01 MORBID (SEVERE) OBESITY DUE TO EXCESS CALORIES (HCC): Primary | ICD-10-CM

## 2024-06-27 NOTE — TELEPHONE ENCOUNTER
From: Alberta Ledezma  To: Stefany Maki  Sent: 6/26/2024 5:05 PM CDT  Subject: Med Refill    Hi there. I have 2 wegovy pens left. Could I please have a refill? Also, this next dose is making me feel a bit nauseas so can we keep the dosage where it is at right now?    Alberta

## 2024-06-27 NOTE — TELEPHONE ENCOUNTER
Requesting wegovy 1 mg  LOV: 5/15/24  RTC: not noted  Last Relevant Labs: na  Filled: 5/15/24 #2ml with 0 refills 1mg dose    Future Appointments   Date Time Provider Department Center   7/12/2024 10:15 AM PF [facility] (1.5T) [facility] [place]   8/26/2024 12:00 PM Stefany Maki MD [facility]

## 2024-07-12 ENCOUNTER — HOSPITAL ENCOUNTER (OUTPATIENT)
Dept: MRI IMAGING | Age: 44
Discharge: HOME | End: 2024-07-12
Attending: PHYSICIAN ASSISTANT
Payer: COMMERCIAL

## 2024-07-12 DIAGNOSIS — G35 MS (MULTIPLE SCLEROSIS) (HCC): ICD-10-CM

## 2024-07-12 DIAGNOSIS — R15.9 INCONTINENCE OF FECES, UNSPECIFIED FECAL INCONTINENCE TYPE: ICD-10-CM

## 2024-07-12 DIAGNOSIS — M79.10 MUSCLE PAIN: ICD-10-CM

## 2024-07-12 DIAGNOSIS — H53.9 VISION CHANGES: ICD-10-CM

## 2024-07-12 PROCEDURE — A9575 INJ GADOTERATE MEGLUMI 0.1ML: HCPCS | Performed by: PHYSICIAN ASSISTANT

## 2024-07-12 PROCEDURE — 70553 MRI BRAIN STEM W/O & W/DYE: CPT | Performed by: PHYSICIAN ASSISTANT

## 2024-07-12 RX ORDER — GADOTERATE MEGLUMINE 376.9 MG/ML
20 INJECTION INTRAVENOUS
Status: COMPLETED | OUTPATIENT
Start: 2024-07-12 | End: 2024-07-12

## 2024-07-12 RX ADMIN — GADOTERATE MEGLUMINE 16 ML: 376.9 INJECTION INTRAVENOUS at 11:53:00

## 2024-08-06 DIAGNOSIS — E66.01 MORBID (SEVERE) OBESITY DUE TO EXCESS CALORIES (HCC): ICD-10-CM

## 2024-08-06 NOTE — TELEPHONE ENCOUNTER
Requesting   Requested Prescriptions     Pending Prescriptions Disp Refills    semaglutide-weight management 1 MG/0.5ML Subcutaneous Solution Auto-injector 2 mL 1     Sig: Inject 0.5 mL (1 mg total) into the skin once a week.       LOV: 5/15/2024  RTC:   Filled: 6/28/2024 #2 mL with 1 refills    Future Appointments   Date Time Provider Department Center   8/26/2024 12:00 PM Stefany Maki MD [facility]     Patient Comment: I can go to the next dose if that's what she recommends.

## 2024-08-07 ENCOUNTER — PATIENT MESSAGE (OUTPATIENT)
Dept: INTERNAL MEDICINE CLINIC | Facility: CLINIC | Age: 44
End: 2024-08-07

## 2024-08-07 NOTE — TELEPHONE ENCOUNTER
PA needed for wegovy 1.7 mg  Asked for current weight since she started it in May and we have no visit since then.

## 2024-08-07 NOTE — TELEPHONE ENCOUNTER
From: Alberta Ledezma  To: Stefany Maki  Sent: 8/7/2024 8:36 AM CDT  Subject: Rx denied    Hi, there. I received a notice saying that the rx was denied. I didn't understand why. Can you help? What should I do now?    Thanks,    Alberta

## 2024-08-07 NOTE — TELEPHONE ENCOUNTER
We applied or coverage of wegovy and got a notice there is already an approval in place until 5/19/2025 from [employer]

## 2024-08-26 ENCOUNTER — TELEMEDICINE (OUTPATIENT)
Dept: INTERNAL MEDICINE CLINIC | Facility: CLINIC | Age: 44
End: 2024-08-26
Payer: COMMERCIAL

## 2024-08-26 DIAGNOSIS — E53.8 VITAMIN B12 DEFICIENCY: ICD-10-CM

## 2024-08-26 DIAGNOSIS — R53.83 OTHER FATIGUE: ICD-10-CM

## 2024-08-26 DIAGNOSIS — G47.33 OBSTRUCTIVE SLEEP APNEA SYNDROME: ICD-10-CM

## 2024-08-26 DIAGNOSIS — Z51.81 THERAPEUTIC DRUG MONITORING: Primary | ICD-10-CM

## 2024-08-26 DIAGNOSIS — E55.9 VITAMIN D DEFICIENCY: ICD-10-CM

## 2024-08-26 DIAGNOSIS — E66.01 MORBID (SEVERE) OBESITY DUE TO EXCESS CALORIES (HCC): ICD-10-CM

## 2024-08-26 NOTE — PROGRESS NOTES
HISTORY OF PRESENT ILLNESS  Chief Complaint   Patient presents with    Weight Check     Video         Alberta Ledezma is a 43 year old female here for follow up in medical weight loss program.     Denies chest pain, shortness of breath, dizziness, blurred vision, headache, paresthesia, nausea/vomiting.   Feels weight loss is slow over the past few months   Has resigned from her second position   Feeling stress relief about getting focused on herself and taking time on her groceries  Reviewed 24 dietary recall and has been planning her diet   Taking the time on the weekends to prep food better  Started headaches at times, takes it at night.     Just started at the 1.7 mg q weekly   Still getting nausea the first 2-3 days   Started to walk at lunch a few days per week   Sometimes on the weekend as well     Still working on her b12 injections   Stills truggling with sleep   Was previously wearing a cpap       Most recent weight : 228 on home scale       Wt Readings from Last 6 Encounters:   06/05/24 248 lb (112.5 kg)   05/15/24 249 lb (112.9 kg)   04/18/24 248 lb 6.4 oz (112.7 kg)   03/04/24 245 lb (111.1 kg)   01/09/24 240 lb (108.9 kg)   12/04/23 228 lb 12.8 oz (103.8 kg)            Breakfast Lunch Dinner Snacks Fluids              REVIEW OF SYSTEMS  GENERAL HEALTH: feels well otherwise, denied any fevers chills or night sweats   RESPIRATORY: denies shortness of breath   CARDIOVASCULAR: denies chest pain  GI: denies abdominal pain    EXAM  LMP 09/28/2023 (Approximate)   GENERAL: well developed, well nourished,in no apparent distress, A/O x3  SKIN: no rashes,no suspicious lesions  HEENT: atraumatic, normocephalic, OP-clear, PERRL  NECK: supple,no adenopathy  LUNGS: clear to auscultation bilaterally   CARDIO: RRR without murmur  GI: good BS's,NT/ND, no masses or HSM  EXTREMITIES: no cyanosis, no clubbing, no edema    Lab Results   Component Value Date    WBC 8.5 10/09/2023    RBC 4.18 10/09/2023    HGB 13.6  10/09/2023    HCT 40.4 10/09/2023    MCV 96.7 10/09/2023    MCH 32.5 10/09/2023    MCHC 33.7 10/09/2023    RDW 12.9 10/09/2023    .0 10/09/2023     Lab Results   Component Value Date     (H) 05/16/2024    BUN 14 05/16/2024    BUNCREA 19.4 05/05/2021    CREATSERUM 0.90 05/16/2024    ANIONGAP 6 05/16/2024    GFRNAA 85 06/06/2022    GFRAA 98 06/06/2022    CA 8.6 05/16/2024    OSMOCALC 289 05/16/2024    ALKPHO 62 05/16/2024    AST 15 05/16/2024    ALT 27 05/16/2024    BILT 0.4 05/16/2024    TP 7.3 05/16/2024    ALB 3.7 05/16/2024    GLOBULIN 3.6 05/16/2024    AGRATIO 1.9 10/21/2013     05/16/2024    K 4.5 05/16/2024     05/16/2024    CO2 23.0 05/16/2024     Lab Results   Component Value Date     05/16/2024    A1C 5.2 05/16/2024     Lab Results   Component Value Date    CHOLEST 189 05/16/2024    TRIG 260 (H) 05/16/2024    HDL 36 (L) 05/16/2024     (H) 05/16/2024    VLDL 45 (H) 05/16/2024    NONHDLC 153 (H) 05/16/2024     Lab Results   Component Value Date    T4F 0.9 05/16/2024    TSH 1.860 05/16/2024    TSHT4 0.78 10/21/2013     Lab Results   Component Value Date    B12 251 05/16/2024     Lab Results   Component Value Date    VITD 17.2 (L) 05/16/2024       Current Outpatient Medications on File Prior to Visit   Medication Sig Dispense Refill    semaglutide-weight management 1.7 MG/0.75ML Subcutaneous Solution Auto-injector Inject 0.75 mL (1.7 mg total) into the skin once a week. 3 mL 0    clonazePAM 0.5 MG Oral Tab Take 1 tablet (0.5 mg total) by mouth nightly as needed. 30 tablet 5    ergocalciferol 1.25 MG (77141 UT) Oral Cap Take 1 capsule (50,000 Units total) by mouth once a week. Once complete switch to over-the-counter vitamin D supplement of 2000 units daily. 12 capsule 0    clotrimazole-betamethasone 1-0.05 % External Cream Apply 1 Application topically 2 (two) times daily as needed. 45 g 1    clobetasol 0.05 % External Cream APPLY EXTERNALLY TO THE AFFECTED AREA OF  PSORIASIS TWICE DAILY FOR UP TO 2 WEEKS PER MONTH AS NEEDED      ketoconazole 2 % External Cream APPLY TOPICALLY UNDER BREAST TWICE DAILY AS NEEDED FOR RASH       No current facility-administered medications on file prior to visit.       ASSESSMENT  Analyzed weight data:       Diagnoses and all orders for this visit:    Therapeutic drug monitoring    Morbid (severe) obesity due to excess calories (HCC)    Vitamin B12 deficiency    Obstructive sleep apnea syndrome    Other fatigue    Vitamin D deficiency    Other orders  -     semaglutide-weight management 1.7 MG/0.75ML Subcutaneous Solution Auto-injector; Inject 0.75 mL (1.7 mg total) into the skin once a week.              PLAN  Initial consult: 1/6/23: 235 lb   Reconsult : 249 lb on 5/15/24   Continue wegovy at 1.7 mg q weekly   -advised of side effects and adverse effects of this medication  Resume b12 injections   Continue vitamin D supplementation   Not currently purusing treatment for sleep apnea but struggling with persistent fatigue  Re-establish with sleep center and resume treamtent   Following lifestyle change  Referral to sleep center to re-establish, JOSE currently not treatment   Struggling with hunger andappetite   Tried phentermine/ qysmia in 2022 with no further weight loss   Cannot take contrave due to regular alcohol intake  Nutrition: low carb diet/ recommended to eat breakfast daily/ regular protein intake  Medication use and side effects reviewed with patient.  Medication contraindications: n/a   Follow up with dietitian and psychologist as recommended.  Discussed the role of sleep and stress in weight management.  Counseled on comprehensive weight loss plan including attention to nutrition, exercise and behavior/stress management for success. See patient instruction below for more details.  Discussed strategies to overcome barriers to successful weight loss and weight maintenance  FITTE: ACSM recommendations (150-300 minutes/ week in active  weight loss)   Weight Loss consent to treat reviewed and signed   There are no Patient Instructions on file for this visit.    No follow-ups on file.    Patient verbalizes understanding.    Stefany Maki MD

## 2024-09-11 RX ORDER — ERGOCALCIFEROL 1.25 MG/1
CAPSULE, LIQUID FILLED ORAL
Qty: 24 CAPSULE | Refills: 0 | OUTPATIENT
Start: 2024-09-11

## 2024-09-30 DIAGNOSIS — R15.9 INCONTINENCE OF FECES, UNSPECIFIED FECAL INCONTINENCE TYPE: ICD-10-CM

## 2024-09-30 DIAGNOSIS — N39.498 OTHER URINARY INCONTINENCE: ICD-10-CM

## 2024-10-01 RX ORDER — OXYBUTYNIN CHLORIDE 5 MG/1
5 TABLET, EXTENDED RELEASE ORAL DAILY
Qty: 30 TABLET | Refills: 0 | Status: SHIPPED | OUTPATIENT
Start: 2024-10-01

## 2024-10-10 ENCOUNTER — HOSPITAL ENCOUNTER (OUTPATIENT)
Dept: ULTRASOUND IMAGING | Age: 44
Discharge: HOME | End: 2024-10-10
Attending: STUDENT IN AN ORGANIZED HEALTH CARE EDUCATION/TRAINING PROGRAM
Payer: COMMERCIAL

## 2024-10-10 DIAGNOSIS — K82.4 GALLBLADDER POLYP: ICD-10-CM

## 2024-10-10 DIAGNOSIS — K76.0 FATTY LIVER: ICD-10-CM

## 2024-10-10 PROCEDURE — 76700 US EXAM ABDOM COMPLETE: CPT | Performed by: STUDENT IN AN ORGANIZED HEALTH CARE EDUCATION/TRAINING PROGRAM

## 2024-10-24 ENCOUNTER — HOSPITAL ENCOUNTER (OUTPATIENT)
Dept: GENERAL RADIOLOGY | Age: 44
Discharge: HOME | End: 2024-10-24
Attending: INTERNAL MEDICINE
Payer: COMMERCIAL

## 2024-10-24 ENCOUNTER — OFFICE VISIT (OUTPATIENT)
Dept: FAMILY MEDICINE CLINIC | Facility: CLINIC | Age: 44
End: 2024-10-24
Payer: COMMERCIAL

## 2024-10-24 VITALS
OXYGEN SATURATION: 97 % | SYSTOLIC BLOOD PRESSURE: 124 MMHG | DIASTOLIC BLOOD PRESSURE: 70 MMHG | TEMPERATURE: 97 F | HEART RATE: 96 BPM | RESPIRATION RATE: 16 BRPM | HEIGHT: 65 IN | BODY MASS INDEX: 38.32 KG/M2 | WEIGHT: 230 LBS
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
## 2024-10-24 DIAGNOSIS — M54.42 ACUTE BILATERAL LOW BACK PAIN WITH LEFT-SIDED SCIATICA: ICD-10-CM

## 2024-10-24 PROCEDURE — 3008F BODY MASS INDEX DOCD: CPT | Performed by: INTERNAL MEDICINE

## 2024-10-24 PROCEDURE — 72110 X-RAY EXAM L-2 SPINE 4/>VWS: CPT | Performed by: INTERNAL MEDICINE

## 2024-10-24 PROCEDURE — 99214 OFFICE O/P EST MOD 30 MIN: CPT | Performed by: INTERNAL MEDICINE

## 2024-10-24 PROCEDURE — 3074F SYST BP LT 130 MM HG: CPT | Performed by: INTERNAL MEDICINE

## 2024-10-24 PROCEDURE — 3078F DIAST BP <80 MM HG: CPT | Performed by: INTERNAL MEDICINE

## 2024-10-24 RX ORDER — HYDROCODONE BITARTRATE AND ACETAMINOPHEN 7.5; 325 MG/1; MG/1
TABLET ORAL
Qty: 20 TABLET | Refills: 0 | Status: SHIPPED | OUTPATIENT
Start: 2024-10-24

## 2024-10-24 RX ORDER — LIDOCAINE 50 MG/G
PATCH TOPICAL
Qty: 9 EACH | Refills: 1 | Status: SHIPPED | OUTPATIENT
Start: 2024-10-24

## 2024-10-24 NOTE — PROGRESS NOTES
Alberta Ledezma is a 44 year old female.  HPI:   Here with back pain on hx of back surgery.  Slipped on the stairs with socks.  Has been 2 weeks and not getting better.   Treating with heat, ice doesn't help much.  Does not travel.  Hx of microdiscectomy.  No full feeling back of her left shin.  Current Outpatient Medications   Medication Sig Dispense Refill    OXYBUTYNIN ER 5 MG Oral Tablet 24 Hr TAKE 1 TABLET(5 MG) BY MOUTH DAILY 30 tablet 0    semaglutide-weight management 1.7 MG/0.75ML Subcutaneous Solution Auto-injector Inject 0.75 mL (1.7 mg total) into the skin once a week. 9 mL 0    semaglutide-weight management 1.7 MG/0.75ML Subcutaneous Solution Auto-injector Inject 0.75 mL (1.7 mg total) into the skin once a week. 3 mL 0    clonazePAM 0.5 MG Oral Tab Take 1 tablet (0.5 mg total) by mouth nightly as needed. 30 tablet 5    clotrimazole-betamethasone 1-0.05 % External Cream Apply 1 Application topically 2 (two) times daily as needed. 45 g 1    clobetasol 0.05 % External Cream APPLY EXTERNALLY TO THE AFFECTED AREA OF PSORIASIS TWICE DAILY FOR UP TO 2 WEEKS PER MONTH AS NEEDED      ketoconazole 2 % External Cream APPLY TOPICALLY UNDER BREAST TWICE DAILY AS NEEDED FOR RASH      ergocalciferol 1.25 MG (58882 UT) Oral Cap Take 1 capsule (50,000 Units total) by mouth once a week. Once complete switch to over-the-counter vitamin D supplement of 2000 units daily. (Patient not taking: Reported on 10/24/2024) 12 capsule 0      Past Medical History:    Abdominal distention    Abdominal pain    Anemia    Anxiety    Arthritis    Back pain    Back problem    Bad breath    Belching    Bleeding nose    Bloating    Blood in the stool    Cervicalgia    Change in hair    Chest pain    Constipation    Depression    Depressive disorder, not elsewhere classified    Diarrhea, unspecified    Dizziness    Dysmenorrhea    Dysphonia    Easy bruising    Fatigue    Flatulence/gas pain/belching    Frequent urination    Headache  disorder    Heart palpitations    Heartburn    Hemorrhoids    High cholesterol    History of depression    History of eating disorder    Irregular bowel habits    Stool not forming    Itch of skin    After hospitalized for neutropenia    Leaking of urine    Major depressive disorder, recurrent episode, mild (HCC)    Mouth sores    Nausea    Night sweats    Pain in joint, pelvic region and thigh    Pain in joints    Pain with bowel movements    Palpitations    Post partum depression    hx w/1st pregnancy    Rash    Rhinitis, allergic    RLS (restless legs syndrome)    Shortness of breath    Sinusitis    Sjogren's disease (HCC)    Skin blushing/flushing    Rosacea    Sleep apnea    cpap    Sleep disturbance    Somnambulance    daytime    Stool incontinence    Stress    Urinary incontinence    Wears glasses    Weight gain    Wheezing      Social History:  Social History     Socioeconomic History    Marital status:    Tobacco Use    Smoking status: Never    Smokeless tobacco: Never   Vaping Use    Vaping status: Never Used   Substance and Sexual Activity    Alcohol use: Yes     Alcohol/week: 5.0 standard drinks of alcohol     Types: 5 Shots of liquor per week     Comment: 2 glasses, 2-4 days a week    Drug use: Never    Sexual activity: Not Currently   Other Topics Concern    Caffeine Concern Yes     Comment: coffee, tea, soda    Exercise Yes     Comment: 4 times per week    Seat Belt Yes   Social History Narrative    The patient does not use an assistive device..      The patient does live in a home with stairs.        REVIEW OF SYSTEMS:   GENERAL HEALTH:in pain as above  SKIN: denies known bruising or swelling to area of impact  RESPIRATORY: denies shortness of breath or cough  CARDIOVASCULAR: denies chest pain or pressure  GI: denies N/V/D; denies abdominal pain    : no urinary retention  MS: LBP as above, changing positions aggravates, nothing helps  NEURO: denies LE weakness; no saddle anesthesia, denies  numbness or tingling    EXAM:   /70   Pulse 96   Temp 97 °F (36.1 °C)   Resp 16   Ht 5' 5\" (1.651 m)   Wt 230 lb (104.3 kg)   SpO2 97%   BMI 38.27 kg/m²   GENERAL: well developed pleasant female in moderate distress due to discomfort  SKIN: warm & dry  HEENT: atraumatic, normocephalic, TMs clear, throat clear  NECK: supple,no adenopathy   LUNGS: CTA, easy breathing  CV: RRR  MS: no thoracolumbar spinal TTP; no edema or ecchymosis to the back; + lumbar paraspinal TTP bilaterally; BLE strength 5+ against resistance; BLEs intact to light sensation; gait steady  EXT: no edema    ASSESSMENT AND PLAN   1. Fall, initial encounter  - rule out vertebral fx  - XR LUMBAR SPINE (MIN 4 VIEWS) (CPT=72110); Future  - OP REFERRAL TO [facility]    2. Acute bilateral low back pain with left-sided sciatica  - topical OTC  NSAIDs encouraged  - HYDROcodone-acetaminophen (NORCO) 7.5-325 MG Oral Tab; Take 1 tab po Q 4-6 hours prn acute pain  Dispense: 20 tablet; Refill: 0  - lidocaine 5 % External Patch; 1-3 patches to the lower back once a day prn pain  Dispense: 9 each; Refill: 1  - OP REFERRAL TO [facility]      The patient indicates understanding of these issues and agrees to the plan.  Follow up after PT, sooner if symptoms worsen.

## 2024-11-07 DIAGNOSIS — R15.9 INCONTINENCE OF FECES, UNSPECIFIED FECAL INCONTINENCE TYPE: ICD-10-CM

## 2024-11-07 DIAGNOSIS — N39.498 OTHER URINARY INCONTINENCE: ICD-10-CM

## 2024-11-07 RX ORDER — OXYBUTYNIN CHLORIDE 5 MG/1
5 TABLET, EXTENDED RELEASE ORAL DAILY
Qty: 90 TABLET | Refills: 0 | Status: SHIPPED | OUTPATIENT
Start: 2024-11-07

## 2024-11-10 ENCOUNTER — PATIENT MESSAGE (OUTPATIENT)
Dept: INTERNAL MEDICINE CLINIC | Facility: CLINIC | Age: 44
End: 2024-11-10

## 2024-11-13 ENCOUNTER — HOSPITAL ENCOUNTER (EMERGENCY)
Age: 44
Discharge: HOME | End: 2024-11-13
Attending: EMERGENCY MEDICINE
Payer: COMMERCIAL

## 2024-11-13 VITALS
TEMPERATURE: 98 F | OXYGEN SATURATION: 97 % | DIASTOLIC BLOOD PRESSURE: 71 MMHG | WEIGHT: 219 LBS | RESPIRATION RATE: 16 BRPM | SYSTOLIC BLOOD PRESSURE: 119 MMHG | HEART RATE: 73 BPM | BODY MASS INDEX: 36.49 KG/M2 | HEIGHT: 65 IN

## 2024-11-13 DIAGNOSIS — R11.2 NAUSEA AND VOMITING IN ADULT: Primary | ICD-10-CM

## 2024-11-13 DIAGNOSIS — E87.6 HYPOKALEMIA: ICD-10-CM

## 2024-11-13 DIAGNOSIS — R19.7 DIARRHEA, UNSPECIFIED TYPE: ICD-10-CM

## 2024-11-13 LAB
ALBUMIN SERPL-MCNC: 3.9 G/DL (ref 3.4–5)
ALBUMIN/GLOB SERPL: 1 {RATIO} (ref 1–2)
ALP LIVER SERPL-CCNC: 73 U/L
ALT SERPL-CCNC: 32 U/L
ANION GAP SERPL CALC-SCNC: 7 MMOL/L (ref 0–18)
AST SERPL-CCNC: 16 U/L (ref 15–37)
B-HCG UR QL: NEGATIVE
BASOPHILS # BLD AUTO: 0.04 X10(3) UL (ref 0–0.2)
BASOPHILS NFR BLD AUTO: 0.4 %
BILIRUB SERPL-MCNC: 0.8 MG/DL (ref 0.1–2)
BILIRUB UR QL STRIP.AUTO: NEGATIVE
BUN BLD-MCNC: 8 MG/DL (ref 9–23)
CALCIUM BLD-MCNC: 9.1 MG/DL (ref 8.5–10.1)
CHLORIDE SERPL-SCNC: 104 MMOL/L (ref 98–112)
CO2 SERPL-SCNC: 23 MMOL/L (ref 21–32)
COLOR UR AUTO: YELLOW
CREAT BLD-MCNC: 0.8 MG/DL
EGFRCR SERPLBLD CKD-EPI 2021: 93 ML/MIN/1.73M2 (ref 60–?)
EOSINOPHIL # BLD AUTO: 0.2 X10(3) UL (ref 0–0.7)
EOSINOPHIL NFR BLD AUTO: 2.2 %
ERYTHROCYTE [DISTWIDTH] IN BLOOD BY AUTOMATED COUNT: 12.5 %
GLOBULIN PLAS-MCNC: 3.8 G/DL (ref 2.8–4.4)
GLUCOSE BLD-MCNC: 79 MG/DL (ref 70–99)
GLUCOSE UR STRIP.AUTO-MCNC: NEGATIVE MG/DL
HCT VFR BLD AUTO: 38 %
HGB BLD-MCNC: 13.3 G/DL
IMM GRANULOCYTES # BLD AUTO: 0.04 X10(3) UL (ref 0–1)
IMM GRANULOCYTES NFR BLD: 0.4 %
LIPASE SERPL-CCNC: 27 U/L (ref 12–53)
LYMPHOCYTES # BLD AUTO: 1.96 X10(3) UL (ref 1–4)
LYMPHOCYTES NFR BLD AUTO: 21.1 %
MCH RBC QN AUTO: 32.7 PG (ref 26–34)
MCHC RBC AUTO-ENTMCNC: 35 G/DL (ref 31–37)
MCV RBC AUTO: 93.4 FL
MONOCYTES # BLD AUTO: 0.51 X10(3) UL (ref 0.1–1)
MONOCYTES NFR BLD AUTO: 5.5 %
NEUTROPHILS # BLD AUTO: 6.53 X10 (3) UL (ref 1.5–7.7)
NEUTROPHILS # BLD AUTO: 6.53 X10(3) UL (ref 1.5–7.7)
NEUTROPHILS NFR BLD AUTO: 70.4 %
NITRITE UR QL STRIP.AUTO: NEGATIVE
OSMOLALITY SERPL CALC.SUM OF ELEC: 275 MOSM/KG (ref 275–295)
PH UR STRIP.AUTO: 5 [PH] (ref 5–8)
PLATELET # BLD AUTO: 260 10(3)UL (ref 150–450)
POTASSIUM SERPL-SCNC: 3.3 MMOL/L (ref 3.5–5.1)
PROT SERPL-MCNC: 7.7 G/DL (ref 6.4–8.2)
PROT UR STRIP.AUTO-MCNC: NEGATIVE MG/DL
RBC # BLD AUTO: 4.07 X10(6)UL
RBC UR QL AUTO: NEGATIVE
SODIUM SERPL-SCNC: 134 MMOL/L (ref 136–145)
SP GR UR STRIP.AUTO: 1.01 (ref 1–1.03)
UROBILINOGEN UR STRIP.AUTO-MCNC: 0.2 MG/DL
WBC # BLD AUTO: 9.3 X10(3) UL (ref 4–11)

## 2024-11-13 PROCEDURE — 96361 HYDRATE IV INFUSION ADD-ON: CPT

## 2024-11-13 PROCEDURE — 99284 EMERGENCY DEPT VISIT MOD MDM: CPT

## 2024-11-13 PROCEDURE — 83690 ASSAY OF LIPASE: CPT | Performed by: EMERGENCY MEDICINE

## 2024-11-13 PROCEDURE — 81025 URINE PREGNANCY TEST: CPT

## 2024-11-13 PROCEDURE — 87086 URINE CULTURE/COLONY COUNT: CPT | Performed by: EMERGENCY MEDICINE

## 2024-11-13 PROCEDURE — 81015 MICROSCOPIC EXAM OF URINE: CPT | Performed by: EMERGENCY MEDICINE

## 2024-11-13 PROCEDURE — 85025 COMPLETE CBC W/AUTO DIFF WBC: CPT | Performed by: EMERGENCY MEDICINE

## 2024-11-13 PROCEDURE — 80053 COMPREHEN METABOLIC PANEL: CPT | Performed by: EMERGENCY MEDICINE

## 2024-11-13 PROCEDURE — 96374 THER/PROPH/DIAG INJ IV PUSH: CPT

## 2024-11-13 PROCEDURE — 81001 URINALYSIS AUTO W/SCOPE: CPT | Performed by: EMERGENCY MEDICINE

## 2024-11-13 RX ORDER — POTASSIUM CHLORIDE 1500 MG/1
20 TABLET, EXTENDED RELEASE ORAL ONCE
Status: COMPLETED | OUTPATIENT
Start: 2024-11-13 | End: 2024-11-13

## 2024-11-13 RX ORDER — ONDANSETRON 4 MG/1
4 TABLET, ORALLY DISINTEGRATING ORAL EVERY 4 HOURS PRN
Qty: 10 TABLET | Refills: 0 | Status: SHIPPED | OUTPATIENT
Start: 2024-11-13 | End: 2024-11-20

## 2024-11-13 RX ORDER — ONDANSETRON 2 MG/ML
4 INJECTION INTRAMUSCULAR; INTRAVENOUS ONCE
Status: COMPLETED | OUTPATIENT
Start: 2024-11-13 | End: 2024-11-13

## 2024-11-13 RX ORDER — DICYCLOMINE HCL 20 MG
20 TABLET ORAL 4 TIMES DAILY PRN
Qty: 30 TABLET | Refills: 0 | Status: SHIPPED | OUTPATIENT
Start: 2024-11-13 | End: 2024-12-13

## 2024-11-13 NOTE — ED INITIAL ASSESSMENT (HPI)
Pt reports she was sent here by her PCP due to nausea, vomiting, diarrhea for the last 2 weeks. Reports she has lost 8 lbs in the last 2 weeks as well.

## 2024-11-14 ENCOUNTER — PATIENT OUTREACH (OUTPATIENT)
Dept: CASE MANAGEMENT | Age: 44
End: 2024-11-14

## 2024-11-14 NOTE — PROGRESS NOTES
ED follow up.    Dana Adams DO  [facility]  [address] [place] [id number]  [phone]    Attempt #1:  Left message on voicemail for patient to call transitions specialist back to schedule follow up appointments. Provided Transitions specialist scheduling phone number [phone].

## 2024-11-14 NOTE — ED PROVIDER NOTES
Patient Seen in: [facility] In [place]      History     Chief Complaint   Patient presents with    Nausea/Vomiting/Diarrhea     Stated Complaint: NVD 2 weeks on Wegovy    Subjective:   HPI      This is a 44-year-old woman, here for evaluation nausea vomiting diarrhea over the past 2 weeks.  Patient currently takes Wegovy has been on this medication for many months last dosage increase was in August, 3 months ago.  Denies any fevers any focal abdominal pain and recent antibiotics or travel close contacts with similar symptoms reports poor appetite, states she is lost about 8 pounds over the past 2 weeks.  Has not had similar symptoms previously.    Objective:     Past Medical History:    Abdominal distention    Abdominal pain    Anemia    Anxiety    Arthritis    Back pain    Back problem    Bad breath    Belching    Bleeding nose    Bloating    Blood in the stool    Cervicalgia    Change in hair    Chest pain    Constipation    Depression    Depressive disorder, not elsewhere classified    Diarrhea, unspecified    Dizziness    Dysmenorrhea    Dysphonia    Easy bruising    Fatigue    Flatulence/gas pain/belching    Frequent urination    Headache disorder    Heart palpitations    Heartburn    Hemorrhoids    High cholesterol    History of depression    History of eating disorder    Irregular bowel habits    Stool not forming    Itch of skin    After hospitalized for neutropenia    Leaking of urine    Major depressive disorder, recurrent episode, mild (HCC)    Mouth sores    Nausea    Night sweats    Pain in joint, pelvic region and thigh    Pain in joints    Pain with bowel movements    Palpitations    Post partum depression    hx w/1st pregnancy    Rash    Rhinitis, allergic    RLS (restless legs syndrome)    Shortness of breath    Sinusitis    Sjogren's disease (HCC)    Skin blushing/flushing    Rosacea    Sleep apnea    cpap    Sleep disturbance    Somnambulance    daytime    Stool incontinence     Stress    Urinary incontinence    Wears glasses    Weight gain    Wheezing              Past Surgical History:   Procedure Laterality Date    Back surgery      Bone marrow aspirate &biopsy  08/05/2021    Colonoscopy      D & c      Egd      Oral surgery procedure      wisdom tooth resection    Other  07/30/2020    ablation uterine    Other  06/20/2022    LEFT LUMBAR 4 - LUMBAR 5 FRAGMENTECTOMY AND MICRODISCECTOMY AND ALL INDICATED PROCEDURES    Spine surgery procedure unlisted  June 20, 2022                Social History     Socioeconomic History    Marital status:    Tobacco Use    Smoking status: Never    Smokeless tobacco: Never   Vaping Use    Vaping status: Never Used   Substance and Sexual Activity    Alcohol use: Yes     Alcohol/week: 5.0 standard drinks of alcohol     Types: 5 Shots of liquor per week     Comment: 2 glasses, 2-4 days a week    Drug use: Never    Sexual activity: Not Currently   Other Topics Concern    Caffeine Concern Yes     Comment: coffee, tea, soda    Exercise Yes     Comment: 4 times per week    Seat Belt Yes   Social History Narrative    The patient does not use an assistive device..      The patient does live in a home with stairs.                  Physical Exam     ED Triage Vitals [11/13/24 1701]   /88   Pulse 78   Resp 18   Temp 98 °F (36.7 °C)   Temp src    SpO2 98 %   O2 Device None (Room air)       Current Vitals:   Vital Signs  BP: 134/88  Pulse: 78  Resp: 18  Temp: 98 °F (36.7 °C)    Oxygen Therapy  SpO2: 98 %  O2 Device: None (Room air)        Physical Exam      Physical Exam  Vitals signs and nursing note reviewed.   General:  Patient laying supine in the bed in no acute distress  Head: Normocephalic and atraumatic.   HEENT:  Mucous membranes are moist.   Cardiovascular:  Normal rate and regular rhythm.  No Edema  Pulmonary:  Pulmonary effort is normal.  Normal breath sounds. No wheezing, rhonchi or rales.   Abdominal: Soft nontender nondistended, normal  bowel sounds, no guarding no rebound tenderness  Skin: Warm and dry  Neurological: Awake alert, speech is normal        ED Course     Labs Reviewed   COMP METABOLIC PANEL (14) - Abnormal; Notable for the following components:       Result Value    Sodium 134 (*)     Potassium 3.3 (*)     BUN 8 (*)     All other components within normal limits   URINALYSIS WITH CULTURE REFLEX - Abnormal; Notable for the following components:    Clarity Urine Hazy (*)     Ketones Urine Trace (*)     Leukocyte Esterase Urine Trace (*)     All other components within normal limits   UA MICROSCOPIC ONLY, URINE - Abnormal; Notable for the following components:    Bacteria Urine 3+ (*)     Squamous Epi. Cells Moderate (*)     All other components within normal limits   LIPASE - Normal   POCT PREGNANCY URINE - Normal   CBC WITH DIFFERENTIAL WITH PLATELET   URINE CULTURE, ROUTINE            XR LUMBAR SPINE (MIN 4 VIEWS) (CPT=72110)    Result Date: 10/24/2024  PROCEDURE:  XR LUMBAR SPINE (MIN 4 VIEWS) (CPT=72110)  TECHNIQUE:  AP, lateral, oblique, and coned down L5-S1 views were obtained.  COMPARISON:  [facility], XR, XR LUMBAR SPINE (MIN 2 VIEWS) (CPT=72100), 5/14/2022, 10:19 AM.  INDICATIONS:  W19.XXXA Fall, initial encounter  PATIENT STATED HISTORY: (As transcribed by Technologist)  Patient fell about 2 weeks ago and injured lower back and now has tingling radiating down both legs. She has a history of L4/L5 microdiscectomy 2 years ago              CONCLUSION:    Degenerative disc changes most notable L4-L5 with increasing disc narrowing since the prior.  Mild osteophytes at this level.  Other disc levels are stable.  No developing fracture, subluxation, pars defect, scoliosis or aggressive process. LOCATION:  [facility]   Dictated by (CST): Tavo Miles MD on 10/24/2024 at 3:46 PM     Finalized by (CST): Tavo Miles MD on 10/24/2024 at 3:47 PM             MDM   44-year-old woman here for evaluation of vomiting diarrhea, poor appetite  over the past 2 weeks.  Differential includes gastroenteritis, medication side effect, electrolyte abnormality.  Patient has no focal abdominal tenderness on exam, she is afebrile hemodynamically stable.  White count is normal, mild hypokalemia repleted.  Liver enzymes normal, bacteria seen in the urine however this was not a clean-catch sample patient without urinary symptoms, nitrate negative.  Lipase normal.  Discussed with patient unclear etiology of her symptoms, possible side effect secondary to Wegovy despite no recent dosage adjustment.  We did discuss a CT scan of the abdomen pelvis, however patient without any focal abdominal tenderness, is tolerating p.o. at present.  She is in agreement with holding off on imaging at this time, will try Zofran as needed, holding off on next week OB dose, close follow-up with PMD, will also send with a order for stool sample if loose stools should persist.  Return precautions discussed, patient is in agreement with plan.        Medical Decision Making      Disposition and Plan     Clinical Impression:  1. Nausea and vomiting in adult    2. Diarrhea, unspecified type    3. Hypokalemia         Disposition:  Discharge  11/13/2024  8:03 pm    Follow-up:  Dana Adams DO  [address] [place] [id number]  [phone]    Follow up  Follow-up with your PMD for reevaluation in 24 to 48 hours.  Return to ER if symptoms worsen or change or if any other new concerns.          Medications Prescribed:  Current Discharge Medication List        START taking these medications    Details   ondansetron 4 MG Oral Tablet Dispersible Take 1 tablet (4 mg total) by mouth every 4 (four) hours as needed for Nausea.  Qty: 10 tablet, Refills: 0      dicyclomine 20 MG Oral Tab Take 1 tablet (20 mg total) by mouth 4 (four) times daily as needed.  Qty: 30 tablet, Refills: 0                 Supplementary Documentation:

## 2024-11-15 NOTE — PROGRESS NOTES
ED follow up.    Dana Adams DO  [facility]  [address]  [facility] [address]  [place] [address]  [phone]    Attempt #2:  Left message on voicemail for patient to call transitions specialist back to schedule follow up appointments. Provided Transitions specialist scheduling phone number [phone]. Closing encounter. Will re-open if patient returns call.

## 2024-11-22 ENCOUNTER — TELEPHONE (OUTPATIENT)
Dept: PHYSICAL THERAPY | Facility: HOSPITAL | Age: 44
End: 2024-11-22

## 2024-11-26 ENCOUNTER — OFFICE VISIT (OUTPATIENT)
Dept: PHYSICAL THERAPY | Facility: HOSPITAL | Age: 44
End: 2024-11-26
Attending: INTERNAL MEDICINE
Payer: COMMERCIAL
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
## 2024-11-26 DIAGNOSIS — M54.42 ACUTE BILATERAL LOW BACK PAIN WITH LEFT-SIDED SCIATICA: ICD-10-CM

## 2024-11-26 PROCEDURE — 97162 PT EVAL MOD COMPLEX 30 MIN: CPT | Performed by: PHYSICAL THERAPIST

## 2024-11-26 PROCEDURE — 97110 THERAPEUTIC EXERCISES: CPT | Performed by: PHYSICAL THERAPIST

## 2024-11-26 NOTE — PROGRESS NOTES
LOWER EXTREMITY EVALUATION:     Diagnosis:   Fall, initial encounter (W19.XXXA)  Acute bilateral low back pain with left-sided sciatica (M54.42)         Referring Provider: Shirley Paulino  Date of Evaluation:    11/26/2024    Precautions:  None Next MD visit:   none scheduled  Date of Surgery: n/a     PATIENT SUMMARY   Alberta Ledezma is a 44 year old female who presents to therapy today with a  history of microdiscectomy  L4-L5 2022/ Some residual numbness in left shin, but otherwise has been great.   October 2024 Recently fell down some stairs, had felt like something was in \"limbo\"  where it felt that he back was getting ready to go out,; three weeks later the pain increased a lot . Current level of pain is now in right LE . To knee, buttocks to knee. Had LEOLA and ablations before surgery for the left le.   Norco for pain relief.  Not taking at present but waits for difficult day s to take medicine.      Home ,self care activities ok   Standing too long (~ 12-15') driving than 30' difficult with  getting in and out of car    Sleeping is uncomfortable and pain doesn't wake her up   Cough sneeze +  Incontinence noted , some stool inconsistence .. Twice in last year.    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     Ease:   Heat,   Not taking any medicine       Nighttime is worst. With right le pain   Pt describes pain level current 4/10, at best 1/10, at worst 9/10.   Current functional limitations include limited with home , self care including sit>stand, working with moving and lifting musical instruments .     Alberta describes prior level of function independent .  Is a music  . Pt goals include to improve back pain to functional level .  Past medical history was reviewed with Alberta. Significant findings include   Past Medical History:    Abdominal distention    Abdominal pain    Anemia    Anxiety    Arthritis     Back pain    Back problem    Bad breath    Belching    Bleeding nose    Bloating    Blood in the stool    Cervicalgia    Change in hair    Chest pain    Constipation    Depression    Depressive disorder, not elsewhere classified    Diarrhea, unspecified    Dizziness    Dysmenorrhea    Dysphonia    Easy bruising    Fatigue    Flatulence/gas pain/belching    Frequent urination    Headache disorder    Heart palpitations    Heartburn    Hemorrhoids    High cholesterol    History of depression    History of eating disorder    Irregular bowel habits    Stool not forming    Itch of skin    After hospitalized for neutropenia    Leaking of urine    Major depressive disorder, recurrent episode, mild (HCC)    Mouth sores    Nausea    Night sweats    Pain in joint, pelvic region and thigh    Pain in joints    Pain with bowel movements    Palpitations    Post partum depression    hx w/1st pregnancy    Rash    Rhinitis, allergic    RLS (restless legs syndrome)    Shortness of breath    Sinusitis    Sjogren's disease (HCC)    Skin blushing/flushing    Rosacea    Sleep apnea    cpap    Sleep disturbance    Somnambulance    daytime    Stool incontinence    Stress    Urinary incontinence    Wears glasses    Weight gain    Wheezing        ASSESSMENT  Alberta presents to physical therapy evaluation with primary c/o low back pain onset after she fell down a few stairs and injuring her back.  She has a +history of microdiscectomy  L4-L5 in 2022 with symptom relief.  This current injury occurrence in October , 20024 and now has pain on the right side with radicular symptom present in her right LE.  +Gowers sign , +dural tension and SLR of the right le.  . The results of the objective tests and measures show loss of ROM of lumbar spine , LE weakness,, pain with home and self care ad;s .    Signs and symptoms are consistent with diagnosis of low back pain .. Pt and PT discussed evaluation findings, pathology, POC and HEP.  Pt voiced  understanding and performs HEP correctly without reported pain. Skilled Physical Therapy is medically necessary to address the above impairments and reach functional goals.     OBJECTIVE:   Observation: flat lordosis.  PPT .    Palpation: increased tone in standing in right >left paraspinals, increased tone in prone with palpation of paraspinal   Sensation: intact     Lumbar  AROM: (* denotes performed with pain)  Flexion: FT o knees, pain with return to upright  (+Gowers sign )   Extension: WFL  Sidebending: R FT to mid thigh with pain ; L mid thigh with pain   Rotation: R NT; L NT    Accessory motion: hypomobility to L3-L5 in prone to Gr 2 pa without change to symptoms     Flexibility:    Hamstrings: R 45; L 40    Strength/MMT: (* denotes performed with pain)  Hip Knee Foot/Ankle   Flexion: R 4/5; L 4/5  Extension: R 4/5; L 44/5  Abduction: R 4/5; L 4/5  ER: R 4/5; L 44/5  IR: R 4/5; L 4/5 Flexion: R 5/5; L 5/5  Extension: R 5/5; L 5/5    DF: R 5/5; L 5/5  PF: R 5/5; L 5/5  INV: R 5/5; L 5/5  EV: R 5/5; L 5/5  Great toe ext: R 5/5; L 5/5     Special tests:   -heel toe walk   +Dural tension right le   +SLR     Gait: pt ambulates on level ground with antalgia  Balance: SLS: R NT sec, L NT sec    Today’s Treatment and Response:   Pt education was provided on exam findings, treatment diagnosis, treatment plan, expectations, and prognosis. Pt was also provided recommendations for activity modifications, possible soreness after evaluation, modalities as needed [ice/heat], postural corrections, ergonomics, pain science education , detrimental fear avoidance behaviors, importance of remaining active, and strategies to reduce fall risk at home.  Patient was instructed in and issued a HEP for: Access Code: [email or web address]  URL: [email or web address]  Date: 11/26/2024  Prepared by: Humaira Mccollum    Exercises  - Sidelying Thoracic Rotation with Open Book  - 1 x daily - 7 x weekly - 1 sets - 5 reps  - Clamshell  - 1  x daily - 7 x weekly - 2 sets - 10 reps  - Supine 90/90 Sciatic Nerve Glide with Knee Flexion/Extension  - 1 x daily - 7 x weekly - 1 sets - 10 reps  - Supine Transversus Abdominis Bracing - Hands on Stomach  - 1 x daily - 7 x weekly - 2 sets - 10 reps  - Seated Transversus Abdominis Bracing with PLB  - 1 x daily - 7 x weekly - 2 sets - 10 reps    Charges: PT Eval Moderate Complexity, te1      Total Timed Treatment: 45 min     Total Treatment Time: 45 min     Based on clinical rationale and outcome measures, this evaluation involved Moderate Complexity decision making due to 3+ personal factors/comorbidities, 3 body structures involved/activity limitations, and evolving symptoms including changing pain levels.  PLAN OF CARE:    Goals: (to be met in 10 visits)  Patient will demonstrate lumbar flexion within available range with no pain within 6 weeks in order to pick objects from the floor.  Patient will tolerate sitting  for >30' hours with no increase in pain within 6 weeks to improve community involvement.  Patient will demonstrate proper squat form with no increase in pain within 6 weeks in order to lift objects from low surfaces.  Patient will demonstrate improvement in DAGOBERTO score  within 6 weeks in order to improve functional mobility and return to PLOF.   Patient will have subjective pain 2/10    Pt will have a - Gowers sign with lumbar mobility     Frequency / Duration: Patient will be seen for 2 x/week or a total of 10 visits over a 90 day period. Treatment will include: Gait training, Manual Therapy, Neuromuscular Re-education, Self-Care Home Management, Therapeutic Activities, Therapeutic Exercise, and Home Exercise Program instruction    Education or treatment limitation: None  Rehab Potential:good    Oswestry Disability Index Score  Score: (Patient-Rptd) 40 % (11/26/2024  2:47 PM)      Patient/Family/Caregiver was advised of these findings, precautions, and treatment options and has agreed to actively  participate in planning and for this course of care.    Thank you for your referral. Please co-sign or sign and return this letter via fax as soon as possible to [phone]. If you have any questions, please contact me at Dept: [phone]    Sincerely,  Electronically signed by therapist: Humaira Mccollum PT  Physician's certification required: Yes  I certify the need for these services furnished under this plan of treatment and while under my care.    X___________________________________________________ Date____________________    Certification From: 11/26/2024  To:2/24/2025

## 2024-12-02 ENCOUNTER — OFFICE VISIT (OUTPATIENT)
Dept: PHYSICAL THERAPY | Facility: HOSPITAL | Age: 44
End: 2024-12-02
Attending: INTERNAL MEDICINE
Payer: COMMERCIAL

## 2024-12-02 PROCEDURE — 97140 MANUAL THERAPY 1/> REGIONS: CPT

## 2024-12-02 PROCEDURE — 97110 THERAPEUTIC EXERCISES: CPT

## 2024-12-03 NOTE — PROGRESS NOTES
Diagnosis:   Fall, initial encounter (W19.XXXA)  Acute bilateral low back pain with left-sided sciatica (M54.42)      Referring Provider: Shirley Paulino  Date of Evaluation:   11/26/24    Precautions:  None Next MD visit:   none scheduled  Date of Surgery: n/a   Insurance Primary/Secondary: [employer] [place] HMO / N/A       # Auth Visits: 5   Total Timed Treatment: 45 min  Date POC Expires: 12/31/24   Total Treatment time: 50 min       Charges: TE 2(30) MT (15)       Treatment Number: 2/5    Subjective: Pt. Reports 5/10 pain in back and down R LE to knee. HEP going well.     Pain: 5/10     Objective/Goals: Refer to flow sheet. Emphasis on core engagement and MT to decrease muscle guarding and tightness.     Treatment 2/5    TE  Scifit x 5 min  A/P board x 10  Quad stretch on 8 inch step x 10  Sand dune  *step up c ADIM x 10  *fwd lunge c ADIM x 10  *mini squat c ADIM x 10  HS flossing x 20 pumps  Bridge c SB x 10  LTR c SB x 10  DKTC x 10    MT  R LAD 1 min x 3  R hooklying distraction 1 min x 3  R side up STM lumbar paraspinals  R side up lumbar rotational mobs gr. III and IV x 10 bouts     MHP x 5 min    Goals: (to be met in 10 visits)  Patient will demonstrate lumbar flexion within available range with no pain within 6 weeks in order to pick objects from the floor.  Patient will tolerate sitting  for >30' hours with no increase in pain within 6 weeks to improve community involvement.  Patient will demonstrate proper squat form with no increase in pain within 6 weeks in order to lift objects from low surfaces.  Patient will demonstrate improvement in DAGOBERTO score  within 6 weeks in order to improve functional mobility and return to PLOF.   Patient will have subjective pain 2/10    Pt will have a - Gowers sign with lumbar mobility     HEP: Patient was instructed in and issued a HEP for: Access Code: [email or web address]  URL: [email or web address]  Date: 11/26/2024  Prepared by: Humaira Mccollum    Exercises  -  Sidelying Thoracic Rotation with Open Book  - 1 x daily - 7 x weekly - 1 sets - 5 reps  - Clamshell  - 1 x daily - 7 x weekly - 2 sets - 10 reps  - Supine 90/90 Sciatic Nerve Glide with Knee Flexion/Extension  - 1 x daily - 7 x weekly - 1 sets - 10 reps  - Supine Transversus Abdominis Bracing - Hands on Stomach  - 1 x daily - 7 x weekly - 2 sets - 10 reps  - Seated Transversus Abdominis Bracing with PLB  - 1 x daily - 7 x weekly - 2 sets - 10 reps  Education: body mechanics, posture education, joint protection principles, importance of remaining active    Assessment: Tolerated session well. Good response to session. Able to decrease R radicular pain with LAD and hooklying distraction. Major muscle guarding and tightness noted along R > L lumbar paraspinals and QL, responded well the MT.       Plan: Assess response to last session. Continue with MT. Add 4 point core stabilization exercises next session.

## 2024-12-04 NOTE — ADDENDUM NOTE
Addended by: Vignesh Saha on: 8/9/2018 01:46 PM     Modules accepted: Orders PAST SURGICAL HISTORY:  No significant past surgical history

## 2024-12-11 ENCOUNTER — TELEPHONE (OUTPATIENT)
Dept: PHYSICAL THERAPY | Facility: HOSPITAL | Age: 44
End: 2024-12-11

## 2024-12-18 ENCOUNTER — OFFICE VISIT (OUTPATIENT)
Dept: PHYSICAL THERAPY | Facility: HOSPITAL | Age: 44
End: 2024-12-18
Attending: INTERNAL MEDICINE
Payer: COMMERCIAL

## 2024-12-18 PROCEDURE — 97140 MANUAL THERAPY 1/> REGIONS: CPT

## 2024-12-18 PROCEDURE — 97110 THERAPEUTIC EXERCISES: CPT

## 2024-12-19 NOTE — PROGRESS NOTES
Diagnosis:   Fall, initial encounter (W19.XXXA)  Acute bilateral low back pain with left-sided sciatica (M54.42)      Referring Provider: Shirley Paulino  Date of Evaluation:   11/26/24    Precautions:  None Next MD visit:   none scheduled  Date of Surgery: n/a   Insurance Primary/Secondary: [employer] / N/A       # Auth Visits: 5   Total Timed Treatment: 45 min  Date POC Expires: 12/31/24   Total Treatment time: 50 min       Charges: TE 2(30) MT (15)       Treatment Number: 3/5    Subjective: Pt. Reports pain 6/10 in back. States wakes up to go to the bathroom frequently throughout night and then back hurts more and she can not get back to sleep.     Pain: 6/10     Objective/Goals: Refer to flow sheet. Emphasis on core engagement and MT to decrease muscle guarding and tightness. Added 4 point to progress core stabilization.    Treatment 3/5    TE  Scifit x 5 min  A/P board x 10  Quad stretch on 8 inch step x 10  Sand dune  *step up c ADIM x 10  *fwd lunge c ADIM x 10  *mini squat c ADIM x 10  4 point  *opp arm/leg x 10  Bridge c SB x 10  LTR c SB x 10  DKTC x 10    MT  R LAD 1 min x 3  R hooklying distraction 1 min x 3  R side up STM lumbar paraspinals  R side up lumbar rotational mobs gr. III and IV x 10 bouts     MHP x 5 min    Goals: (to be met in 10 visits)  Patient will demonstrate lumbar flexion within available range with no pain within 6 weeks in order to pick objects from the floor.  Patient will tolerate sitting  for >30' hours with no increase in pain within 6 weeks to improve community involvement.  Patient will demonstrate proper squat form with no increase in pain within 6 weeks in order to lift objects from low surfaces.  Patient will demonstrate improvement in DAGOBERTO score  within 6 weeks in order to improve functional mobility and return to PLOF.   Patient will have subjective pain 2/10    Pt will have a - Gowers sign with lumbar mobility     HEP: Patient was instructed in and issued a HEP for:  Access Code: [email or web address]  URL: [email or web address]  Date: 11/26/2024  Prepared by: Humaira Mccollum    Exercises  - Sidelying Thoracic Rotation with Open Book  - 1 x daily - 7 x weekly - 1 sets - 5 reps  - Clamshell  - 1 x daily - 7 x weekly - 2 sets - 10 reps  - Supine 90/90 Sciatic Nerve Glide with Knee Flexion/Extension  - 1 x daily - 7 x weekly - 1 sets - 10 reps  - Supine Transversus Abdominis Bracing - Hands on Stomach  - 1 x daily - 7 x weekly - 2 sets - 10 reps  - Seated Transversus Abdominis Bracing with PLB  - 1 x daily - 7 x weekly - 2 sets - 10 reps  Education: body mechanics, posture education, joint protection principles, importance of remaining active    Assessment: Tolerated session well. Good response to session. Able to decrease R radicular pain with LAD and hooklying distraction. Major muscle guarding and tightness noted along R > L lumbar paraspinals and QL, responded well the MT.       Plan: Assess response to last session. Continue with MT. Add seated on SB next session.

## 2024-12-23 ENCOUNTER — OFFICE VISIT (OUTPATIENT)
Dept: PHYSICAL THERAPY | Facility: HOSPITAL | Age: 44
End: 2024-12-23
Attending: INTERNAL MEDICINE
Payer: COMMERCIAL

## 2024-12-23 PROCEDURE — 97110 THERAPEUTIC EXERCISES: CPT

## 2024-12-23 PROCEDURE — 97140 MANUAL THERAPY 1/> REGIONS: CPT

## 2024-12-24 NOTE — PROGRESS NOTES
Diagnosis:   Fall, initial encounter (W19.XXXA)  Acute bilateral low back pain with left-sided sciatica (M54.42)      Referring Provider: Shirley Paulino  Date of Evaluation:   11/26/24    Precautions:  None Next MD visit:   none scheduled  Date of Surgery: n/a   Ins[place] Primary/Secondary[place] / N/[place]       # Auth Visits: 5   Total Timed Treatment: 45 min  Date POC Expires: 12/31/24   Total Treatment time: 50 min       Charges: TE 2(30) MT (15)       Treatment Number: 4/5    Subjective: Pt. Reports pain staying in low back.     Pain: 6/10     Objective/Goals: Refer to flow sheet. Emphasis on core engagement and MT to decrease muscle guarding and tightness. Added seated SB exercises  to progress core stabilization.    Treatment 4/5    TE  Scifit x 5 min  A/P board x 10  Quad stretch on 8 inch step x 10  Sand dune  *step up c ADIM x 10  *fwd lunge c ADIM x 10  *mini squat c ADIM x 10  Seated SB  *pelvic circles cw/ccw x 10  *march x 10  *knee ext x 10  *opp arm/leg x 10  Bridge c SB x 10  LTR c SB x 10  DKTC x 10    MT  R LAD 1 min x 3  R hooklying distraction 1 min x 3  R side up STM lumbar paraspinals  R side up lumbar rotational mobs gr. III and IV x 10 bouts     MHP x 5 min    Goals: (to be met in 10 visits)  Patient will demonstrate lumbar flexion within available range with no pain within 6 weeks in order to pick objects from the floor.  Patient will tolerate sitting  for >30' hours with no increase in pain within 6 weeks to improve community involvement.  Patient will demonstrate proper squat form with no increase in pain within 6 weeks in order to lift objects from low surfaces.  Patient will demonstrate improvement in DAGOBERTO score  within 6 weeks in order to improve functional mobility and return to PLOF.   Patient will have subjective pain 2/10    Pt will have a - Gowers sign with lumbar mobility     HEP: Patient was instructed in and issued a HEP for: Access Code: [email or web address]  URL:  [email or web address]  Date: 11/26/2024  Prepared by: Humaira Mccollum    Exercises  - Sidelying Thoracic Rotation with Open Book  - 1 x daily - 7 x weekly - 1 sets - 5 reps  - Clamshell  - 1 x daily - 7 x weekly - 2 sets - 10 reps  - Supine 90/90 Sciatic Nerve Glide with Knee Flexion/Extension  - 1 x daily - 7 x weekly - 1 sets - 10 reps  - Supine Transversus Abdominis Bracing - Hands on Stomach  - 1 x daily - 7 x weekly - 2 sets - 10 reps  - Seated Transversus Abdominis Bracing with PLB  - 1 x daily - 7 x weekly - 2 sets - 10 reps  Education: body mechanics, posture education, joint protection principles, importance of remaining active    Assessment: Tolerated session well. Good response to session. Pain staying in back. Moderate muscle guarding and tightness noted along R > L lumbar paraspinals and QL, responded well the MT.       Plan: Reassess next visit to request more appointments.

## 2024-12-30 ENCOUNTER — OFFICE VISIT (OUTPATIENT)
Dept: PHYSICAL THERAPY | Facility: HOSPITAL | Age: 44
End: 2024-12-30
Attending: INTERNAL MEDICINE
Payer: COMMERCIAL

## 2024-12-30 PROCEDURE — 97110 THERAPEUTIC EXERCISES: CPT

## 2024-12-30 PROCEDURE — 97140 MANUAL THERAPY 1/> REGIONS: CPT

## 2024-12-31 NOTE — PROGRESS NOTES
Dear Dana Adams DO, [place],    This letter is to inform you of Alberta Ledezma's Progress in Physical Therapy at [facility].    Dx: Fall, initial encounter (W19.XXXA)  Acute bilateral low back pain with left-sided sciatica (M54.42)         Treatment # __5__ of __5__      Date of service: 12/30/2024    ASSESSMENT  Pt. Reports 75% improvement. States pain has centralized to low back and R buttock. States she now has times with no pain and max 6/10. Was able to carry a heavy present and had some muscle soreness but did not increase her LBP. Pt. States sleep is still disturbed from LBP along with prolonged sitting or walking. Pt. Would benefit from continuation of PT at this time to progress toward below unmet goals. Requesting authorization for 5 more visits, 10 total.     OTHER  Palpation: mild tenderness and tightness R QL, piriformis, and R lumbar paraspinals  Sensation: intact     Lumbar  AROM: (* denotes performed with pain)  Flexion: 0cm  Extension: 10 cm  Sidebending: R 54cm. L 49cm  Rotation: R 64cm, L 64cm    Accessory motion: hypomobility to L3-L5 in prone to Gr 2 pa without change to symptoms     Flexibility:  Hamstrings: R 45; L 40    Strength/MMT: (* denotes performed with pain)  Core: upper abs 3/5, lower abs 3+/5, back ext 4-/5  Hip Knee Foot/Ankle   Flexion: R 4/5; L 4+/5  Extension: R 4/5; L 4/5  Abduction: R 4/5; L 4/5  ER: R 4/5; L 4/5  IR: R 4/5; L 4/5 Flexion: R 5/5; L 5/5  Extension: R 5/5; L 5/5    DF: R 5/5; L 5/5  PF: R 5/5; L 5/5  INV: R 5/5; L 5/5  EV: R 5/5; L 5/5  Great toe ext: R 5/5; L 5/5     Special tests:   +Dural tension right le   +SLR R at 60 deg  (+) R piriformis    Gait: pt ambulates on level ground with mild antalgia  Balance: SLS: R 30 sec, L 30 sec      LONG AND SHORT TERM GOALS  Goals: (to be met in 10 visits)  Patient will demonstrate lumbar flexion within available range with no pain within 6 weeks in order to  pick objects from the floor. Progress  Patient will tolerate sitting  for >30' hours with no increase in pain within 6 weeks to improve community involvement. Progress  Patient will demonstrate proper squat form with no increase in pain within 6 weeks in order to lift objects from low surfaces. Progress  Patient will demonstrate improvement in DAGOBERTO score  within 6 weeks in order to improve functional mobility and return to PLOF. Progress  Patient will have subjective pain 2/10. Progress  Pt will have a - Gowers sign with lumbar mobility. Progress    RECOMMENDATIONS AND PLAN OF TREATMENT  Requesting [employer] for an additional 5 visits, 10 total to progress toward above unmet goals. Treatment to progress therapeutic exercise, manual therapy, NREED, modalities prn, and progressive HEP.     FREQUENCY: 1-2x/wk    DURATION: 5 weeks    REHAB POTENTIAL:  Good for above goals. Pt. Has been making improvement in overall functional mobility and centralization of pain.     PATIENT/FAMILY/CAREGIVER WAS ADVISED OF THESE FINDINGS, PRECAUTIONS, AND TREATMENT OPTIONS AND HAS AGREED TO ACTIVELY PARTICIPATE IN PLANNING AND FOR THIS COURSE OF CARE.  THANK YOU FOR YOUR REFERRAL. IF YOU HAVE ANY QUESTIONS PLEASE CONTACT ME AT:  [facility]: [phone]    PHYSICIAN CERTIFICATION REQUIRED: yes    SINCERELY,     RICHARD CROWLEY, PT    I CERTIFY THE NEED FOR THESE SERVICES FURNISHED UNDER THIS PLAN OF TREATMENT AND WHILE UNDER MY CARE.     X__________________________________________________________________        DATE:     CERTIFICATION FROM: __________12/30/24 TO ______3/29/25_____________    Thank you for the referral of Alberta Ledezma.  Requesting [employer] for 5 additional visits, 10 visits total.     FAX NUMBER: [phone]  [id number]

## 2025-01-06 ENCOUNTER — OFFICE VISIT (OUTPATIENT)
Dept: PHYSICAL THERAPY | Facility: HOSPITAL | Age: 45
End: 2025-01-06
Attending: INTERNAL MEDICINE
Payer: COMMERCIAL

## 2025-01-06 PROCEDURE — 97110 THERAPEUTIC EXERCISES: CPT

## 2025-01-06 PROCEDURE — 97140 MANUAL THERAPY 1/> REGIONS: CPT

## 2025-01-07 NOTE — PROGRESS NOTES
Diagnosis:   Fall, initial encounter (W19.XXXA)  Acute bilateral low back pain with left-sided sciatica (M54.42)      Referring Provider: Shirley Paulino  Date of Evaluation:   11/26/24    Precautions:  None Next MD visit:   none scheduled  Date of Surgery: n/a   [place] / N[place]       # Auth Visits: 10   Total Timed Treatment: 45 min  Date POC Expires: 4/1/25   Total Treatment time: 50 min       Charges: TE 2(25) MT (20)       Treatment Number: 6/10    Subjective: Pt. Reports she slipped on the ice at her house and fell. Landed mostly on her butt. States everything is sore currently. States is not having any radicular pain down leg. Pain in buttocks, since she landed on that on the driveway.     Pain: 6/10     Objective/Goals: Refer to flow sheet. Emphasis on gentle stretching and  MT to decrease muscle guarding and tightness due to recent fall today. Gentle seated SB exercises  to progress core stabilization.    Treatment 6/10    TE  Scifit x 5 min  A/P board x 10  Quad stretch on 8 inch step x 10  Seated SB  *pelvic circles cw/ccw x 10  *march x 10  *knee ext x 10  *opp arm/leg x 10  LTR c SB x 10  DKTC x 10  HS with flossing x 20 pumps  Passive stretching B LE's by PT    MT  R LAD 1 min x 3  R hooklying distraction 1 min x 3  R side up STM lumbar paraspinals  R side up lumbar rotational mobs gr. III and IV x 10 bouts     MHP x 5 min    Goals: (to be met in 10 visits)  Patient will demonstrate lumbar flexion within available range with no pain within 6 weeks in order to pick objects from the floor. Progress  Patient will tolerate sitting  for >30' hours with no increase in pain within 6 weeks to improve community involvement.Progress  Patient will demonstrate proper squat form with no increase in pain within 6 weeks in order to lift objects from low surfaces. Progress  Patient will demonstrate improvement in DAGOBERTO score  within 6 weeks in order to improve functional mobility and  return to PLOF. Progress  Patient will have subjective pain 2/10. Progress  Pt will have a - Gowers sign with lumbar mobility. Met     HEP: Patient was instructed in and issued a HEP for: Access Code: [email or web address]  URL: [email or web address]  Date: 11/26/2024  Prepared by: Humaira Mccollum    Exercises  - Sidelying Thoracic Rotation with Open Book  - 1 x daily - 7 x weekly - 1 sets - 5 reps  - Clamshell  - 1 x daily - 7 x weekly - 2 sets - 10 reps  - Supine 90/90 Sciatic Nerve Glide with Knee Flexion/Extension  - 1 x daily - 7 x weekly - 1 sets - 10 reps  - Supine Transversus Abdominis Bracing - Hands on Stomach  - 1 x daily - 7 x weekly - 2 sets - 10 reps  - Seated Transversus Abdominis Bracing with PLB  - 1 x daily - 7 x weekly - 2 sets - 10 reps  Education: body mechanics, posture education, joint protection principles, importance of remaining active    Assessment: Tolerated session well. Good response to session. Pain staying in back. Moderate muscle guarding and tightness noted along R > L lumbar paraspinals and QL, responded well the MT.       Plan: Progress gentle core strengthening and MT as tolerated.

## 2025-01-07 NOTE — PROGRESS NOTES
HISTORY OF PRESENT ILLNESS  Chief Complaint   Patient presents with    Weight Check     Down 9        Alberta Ledezma is a 44 year old female here for follow up in medical weight loss program.     Denies chest pain, shortness of breath, dizziness, blurred vision, headache, paresthesia, nausea/vomiting.   Down 9 lb   Has been off medication since November   Had diarrhea and sulfur burps at that time  Has been conscious in protein and salads/ avocado / chicken   Has been doing much better with drinking water   Has been able to slow down her eating   Currently in physical therapy since late November   Likes to go out for walk         [facility] Follow Up    General Information  Nutrition Recall  Exercise     Sleep                  Wt Readings from Last 6 Encounters:   01/08/25 239 lb (108.4 kg)   11/13/24 219 lb (99.3 kg)   10/24/24 230 lb (104.3 kg)   06/05/24 248 lb (112.5 kg)   05/15/24 249 lb (112.9 kg)   04/18/24 248 lb 6.4 oz (112.7 kg)            Breakfast Lunch Dinner Snacks Fluids   Reviewed              REVIEW OF SYSTEMS  GENERAL HEALTH: feels well otherwise, denied any fevers chills or night sweats   RESPIRATORY: denies shortness of breath   CARDIOVASCULAR: denies chest pain  GI: denies abdominal pain    EXAM  /76   Pulse 85   Resp 20   Ht 5' 5\" (1.651 m)   Wt 239 lb (108.4 kg)   BMI 39.77 kg/m²   GENERAL: well developed, well nourished,in no apparent distress, A/O x3  SKIN: no rashes,no suspicious lesions  HEENT: atraumatic, normocephalic, OP-clear, PERRL  NECK: supple,no adenopathy  LUNGS: clear to auscultation bilaterally   CARDIO: RRR without murmur  GI: good BS's,NT/ND, no masses or HSM  EXTREMITIES: no cyanosis, no clubbing, no edema    Lab Results   Component Value Date    WBC 9.3 11/13/2024    RBC 4.07 11/13/2024    HGB 13.3 11/13/2024    HCT 38.0 11/13/2024    MCV 93.4 11/13/2024    MCH 32.7 11/13/2024    MCHC 35.0 11/13/2024    RDW 12.5 11/13/2024    .0 11/13/2024     Lab Results    Component Value Date    GLU 79 11/13/2024    BUN 8 (L) 11/13/2024    BUNCREA 19.4 05/05/2021    CREATSERUM 0.80 11/13/2024    ANIONGAP 7 11/13/2024    GFRNAA 85 06/06/2022    GFRAA 98 06/06/2022    CA 9.1 11/13/2024    OSMOCALC 275 11/13/2024    ALKPHO 73 11/13/2024    AST 16 11/13/2024    ALT 32 11/13/2024    BILT 0.8 11/13/2024    TP 7.7 11/13/2024    ALB 3.9 11/13/2024    GLOBULIN 3.8 11/13/2024    AGRATIO 1.9 10/21/2013     (L) 11/13/2024    K 3.3 (L) 11/13/2024     11/13/2024    CO2 23.0 11/13/2024     Lab Results   Component Value Date     05/16/2024    A1C 5.2 05/16/2024     Lab Results   Component Value Date    CHOLEST 189 05/16/2024    TRIG 260 (H) 05/16/2024    HDL 36 (L) 05/16/2024     (H) 05/16/2024    VLDL 45 (H) 05/16/2024    NONHDLC 153 (H) 05/16/2024     Lab Results   Component Value Date    T4F 0.9 05/16/2024    TSH 1.860 05/16/2024    TSHT4 0.78 10/21/2013     Lab Results   Component Value Date    B12 251 05/16/2024     Lab Results   Component Value Date    VITD 17.2 (L) 05/16/2024       Current Outpatient Medications on File Prior to Visit   Medication Sig Dispense Refill    Ciclopirox 1 % External Shampoo       Fluocinonide 0.05 % External Solution       OXYBUTYNIN ER 5 MG Oral Tablet 24 Hr TAKE 1 TABLET(5 MG) BY MOUTH DAILY 90 tablet 0    clonazePAM 0.5 MG Oral Tab Take 1 tablet (0.5 mg total) by mouth nightly as needed. 30 tablet 5     No current facility-administered medications on file prior to visit.       ASSESSMENT  Analyzed weight data:  Weight Calculations  Initial Weight: 229 lbs  Initial Weight Date: 01/26/23  Today's Weight: 238 lbs  5% Goal: 11.45  10% Goal: 22.9  Total Weight Loss: -9 lbs    Diagnoses and all orders for this visit:    Therapeutic drug monitoring  -     Pulmonary Referral - In Network  -     Phentermine HCl 37.5 MG Oral Tab; Take 1 tablet (37.5 mg total) by mouth every morning before breakfast.    Morbid (severe) obesity due to excess  calories (HCC)  -     Pulmonary Referral - In Network  -     Phentermine HCl 37.5 MG Oral Tab; Take 1 tablet (37.5 mg total) by mouth every morning before breakfast.    Vitamin B12 deficiency  -     Pulmonary Referral - In Network  -     Phentermine HCl 37.5 MG Oral Tab; Take 1 tablet (37.5 mg total) by mouth every morning before breakfast.    Other fatigue  -     Pulmonary Referral - In Network  -     Phentermine HCl 37.5 MG Oral Tab; Take 1 tablet (37.5 mg total) by mouth every morning before breakfast.    Obstructive sleep apnea syndrome  -     Pulmonary Referral - In Network  -     Phentermine HCl 37.5 MG Oral Tab; Take 1 tablet (37.5 mg total) by mouth every morning before breakfast.    Vitamin D deficiency  -     Pulmonary Referral - In Network  -     Phentermine HCl 37.5 MG Oral Tab; Take 1 tablet (37.5 mg total) by mouth every morning before breakfast.    Binge eating disorder, unspecified severity  -     Pulmonary Referral - In Network  -     Phentermine HCl 37.5 MG Oral Tab; Take 1 tablet (37.5 mg total) by mouth every morning before breakfast.                PLAN  Initial consult: 1/6/23: 235 lb   Reconsult : 249 lb on 5/15/24   We reviewed his diet and exercise plan. We also discussed insurance and medical indications for surgery. We discussed different surgery types from lap band, sleeve, RYGB. We reviewed insurance requirements for 6 monthly visits. We discussed checking with the insurance in regards to benefit. We discussed pre surgery requirements ranging from cardiology, pulmonary, dietary and psychology as well as possible gastric evaluation. Also reviewed bariatric seminar and information. We discussed post surgery evaluation and typical diet as well as typical meal plan and diet after surgery. We also reviewed typical recovery time and return to work on average. We reviewed average weight loss with each surgery procedure and patients at risk for late and early weight regain.  All questions  answered.    Referral to establish with sleep clinic   Continue b12 supplemtnation   Continue vitamin D supplementation   Struggling with hunger andappetite   Tried phentermine/ qysmia in 2022 with no further weight loss   Cannot take contrave due to regular alcohol intake  Nutrition: low carb diet/ recommended to eat breakfast daily/ regular protein intake  Medication use and side effects reviewed with patient.  Medication contraindications: n/a   Follow up with dietitian and psychologist as recommended.  Discussed the role of sleep and stress in weight management.  Counseled on comprehensive weight loss plan including attention to nutrition, exercise and behavior/stress management for success. See patient instruction below for more details.  Discussed strategies to overcome barriers to successful weight loss and weight maintenance  FITTE: ACSM recommendations (150-300 minutes/ week in active weight loss)   Weight Loss consent to treat reviewed and signed   There are no Patient Instructions on file for this visit.    No follow-ups on file.    Patient verbalizes understanding.    Stefany Maki MD

## 2025-01-08 ENCOUNTER — OFFICE VISIT (OUTPATIENT)
Dept: INTERNAL MEDICINE CLINIC | Facility: CLINIC | Age: 45
End: 2025-01-08
Payer: COMMERCIAL

## 2025-01-08 VITALS
HEART RATE: 85 BPM | DIASTOLIC BLOOD PRESSURE: 76 MMHG | BODY MASS INDEX: 39.82 KG/M2 | WEIGHT: 239 LBS | RESPIRATION RATE: 20 BRPM | SYSTOLIC BLOOD PRESSURE: 122 MMHG | HEIGHT: 65 IN

## 2025-01-08 DIAGNOSIS — E55.9 VITAMIN D DEFICIENCY: ICD-10-CM

## 2025-01-08 DIAGNOSIS — G47.33 OBSTRUCTIVE SLEEP APNEA SYNDROME: ICD-10-CM

## 2025-01-08 DIAGNOSIS — E66.01 MORBID (SEVERE) OBESITY DUE TO EXCESS CALORIES (HCC): ICD-10-CM

## 2025-01-08 DIAGNOSIS — Z51.81 THERAPEUTIC DRUG MONITORING: Primary | ICD-10-CM

## 2025-01-08 DIAGNOSIS — E53.8 VITAMIN B12 DEFICIENCY: ICD-10-CM

## 2025-01-08 DIAGNOSIS — F50.819 BINGE EATING DISORDER, UNSPECIFIED SEVERITY: ICD-10-CM

## 2025-01-08 DIAGNOSIS — R53.83 OTHER FATIGUE: ICD-10-CM

## 2025-01-08 PROCEDURE — 3008F BODY MASS INDEX DOCD: CPT | Performed by: INTERNAL MEDICINE

## 2025-01-08 PROCEDURE — 3074F SYST BP LT 130 MM HG: CPT | Performed by: INTERNAL MEDICINE

## 2025-01-08 PROCEDURE — 3078F DIAST BP <80 MM HG: CPT | Performed by: INTERNAL MEDICINE

## 2025-01-08 PROCEDURE — 99214 OFFICE O/P EST MOD 30 MIN: CPT | Performed by: INTERNAL MEDICINE

## 2025-01-08 RX ORDER — PHENTERMINE HYDROCHLORIDE 37.5 MG/1
37.5 TABLET ORAL
Qty: 30 TABLET | Refills: 2 | Status: SHIPPED | OUTPATIENT
Start: 2025-01-08

## 2025-01-08 RX ORDER — FLUOCINONIDE TOPICAL SOLUTION USP, 0.05% 0.5 MG/ML
SOLUTION TOPICAL
COMMUNITY
Start: 2024-11-13

## 2025-01-08 RX ORDER — CICLOPIROX 1 G/100ML
SHAMPOO TOPICAL
COMMUNITY
Start: 2024-11-13

## 2025-01-13 ENCOUNTER — PATIENT MESSAGE (OUTPATIENT)
Dept: INTERNAL MEDICINE CLINIC | Facility: CLINIC | Age: 45
End: 2025-01-13

## 2025-01-13 DIAGNOSIS — F50.819 BINGE EATING DISORDER, UNSPECIFIED SEVERITY: ICD-10-CM

## 2025-01-13 DIAGNOSIS — E66.01 MORBID (SEVERE) OBESITY DUE TO EXCESS CALORIES (HCC): Primary | ICD-10-CM

## 2025-01-13 DIAGNOSIS — G47.33 OBSTRUCTIVE SLEEP APNEA SYNDROME: ICD-10-CM

## 2025-01-15 ENCOUNTER — OFFICE VISIT (OUTPATIENT)
Dept: PHYSICAL THERAPY | Facility: HOSPITAL | Age: 45
End: 2025-01-15
Attending: INTERNAL MEDICINE
Payer: COMMERCIAL

## 2025-01-15 PROCEDURE — 97110 THERAPEUTIC EXERCISES: CPT

## 2025-01-15 PROCEDURE — 97140 MANUAL THERAPY 1/> REGIONS: CPT

## 2025-01-19 NOTE — PROGRESS NOTES
Diagnosis:   Fall, initial encounter (W19.XXXA)  Acute bilateral low back pain with left-sided sciatica (M54.42)      Referring Provider: Shirley Paulino  Date of Evaluation:   11/26/24    Precautions:  None Next MD visit:   none scheduled  Date of Surgery: n/a   Insurance Primary/Secondary: [place] / N/A       # Auth Visits: 10   Total Timed Treatment: 45 min  Date POC Expires: 4/1/25   Total Treatment time: 50 min       Charges: TE 2(25) MT (20)       Treatment Number: 7/10    Subjective: Pt. Reports pain about 5/10 in low back. States no longer going down leg.   Pain: 5/10   Objective/Goals: Refer to flow sheet. Emphasis on gentle stretching and  MT to decrease muscle guarding and tightness due to recent fall today.     Treatment 7/10    TE  Scifit x 5 min  A/P board x 10  Quad stretch on 8 inch step x 10  Sand dune  *step up x 10  *fwd lunge x 10  *lat lunge x 10  *squat x 10  *toe/heel raises x 10  LTR c SB x 10  DKTC x 10  HS with flossing x 20 pumps  Passive stretching B LE's by PT    MT  R LAD 1 min x 3  R hooklying distraction 1 min x 3  R side up STM lumbar paraspinals  R side up lumbar rotational mobs gr. III and IV x 10 bouts     MHP x 5 min    Goals: (to be met in 10 visits)  Patient will demonstrate lumbar flexion within available range with no pain within 6 weeks in order to pick objects from the floor. Progress  Patient will tolerate sitting  for >30' hours with no increase in pain within 6 weeks to improve community involvement.Progress  Patient will demonstrate proper squat form with no increase in pain within 6 weeks in order to lift objects from low surfaces. Progress  Patient will demonstrate improvement in DAGOBERTO score  within 6 weeks in order to improve functional mobility and return to PLOF. Progress  Patient will have subjective pain 2/10. Progress  Pt will have a - Gowers sign with lumbar mobility. Met     HEP: Patient was instructed in and issued a HEP for: Access Code:  [email or web address]  URL: [email or web address]  Date: 11/26/2024  Prepared by: Humaira Mccollum    Exercises  - Sidelying Thoracic Rotation with Open Book  - 1 x daily - 7 x weekly - 1 sets - 5 reps  - Clamshell  - 1 x daily - 7 x weekly - 2 sets - 10 reps  - Supine 90/90 Sciatic Nerve Glide with Knee Flexion/Extension  - 1 x daily - 7 x weekly - 1 sets - 10 reps  - Supine Transversus Abdominis Bracing - Hands on Stomach  - 1 x daily - 7 x weekly - 2 sets - 10 reps  - Seated Transversus Abdominis Bracing with PLB  - 1 x daily - 7 x weekly - 2 sets - 10 reps  Education: body mechanics, posture education, joint protection principles, importance of remaining active    Assessment: Tolerated session well. Good response to session. Pain staying in back. Moderate muscle guarding and tightness noted along R > L lumbar paraspinals and QL, responded well the MT.       Plan: Progress gentle core strengthening and MT as tolerated.

## 2025-01-22 ENCOUNTER — APPOINTMENT (OUTPATIENT)
Dept: PHYSICAL THERAPY | Facility: HOSPITAL | Age: 45
End: 2025-01-22
Attending: INTERNAL MEDICINE
Payer: COMMERCIAL

## 2025-02-05 ENCOUNTER — OFFICE VISIT (OUTPATIENT)
Dept: PHYSICAL THERAPY | Facility: HOSPITAL | Age: 45
End: 2025-02-05
Attending: INTERNAL MEDICINE
Payer: COMMERCIAL

## 2025-02-05 PROCEDURE — 97140 MANUAL THERAPY 1/> REGIONS: CPT

## 2025-02-05 PROCEDURE — 97110 THERAPEUTIC EXERCISES: CPT

## 2025-02-12 ENCOUNTER — OFFICE VISIT (OUTPATIENT)
Dept: PHYSICAL THERAPY | Facility: HOSPITAL | Age: 45
End: 2025-02-12
Attending: INTERNAL MEDICINE
Payer: COMMERCIAL

## 2025-02-12 PROCEDURE — 97140 MANUAL THERAPY 1/> REGIONS: CPT

## 2025-02-12 PROCEDURE — 97110 THERAPEUTIC EXERCISES: CPT

## 2025-02-16 NOTE — PROGRESS NOTES
Diagnosis:   Fall, initial encounter (W19.XXXA)  Acute bilateral low back pain with left-sided sciatica (M54.42)      Referring Provider: Shirley Paulino  Date of Evaluation:   11/26/24    Precautions:  None Next MD visit:   none scheduled  Date of Surgery: n/a   [place] / N/[place]       # Auth Visits: 10   Total Timed Treatment: 45 min  Date POC Expires: 4/1/25   Total Treatment time: 50 min       Charges: TE 2(25) MT (20)       Treatment Number: 9/10    Subjective: Pt. Reports pain about 4/10 in low back. More stiffness than pain  Pain: 4/10   Objective/Goals: Refer to flow sheet. Emphasis on gentle stretching and  MT to decrease muscle guarding and tightness. Progressed core stabilization with sand dune and seated on SB exercises.     Treatment 8/10    TE  Scifit x 5 min  A/P board x 10  Quad stretch on 8 inch step x 10  Sand dune  *step up x 15  *fwd lunge x 15  *lat lunge x 15  *squat with ball squeeze x 15  *toe/heel raises x 15  Seated on SB  *pelvic circles cw/ccw x 10 each  *march x 10  *knee ext x 10  *opp arm/leg x 10  *seated flex stretch x 10  HS with flossing x 20 pumps  Passive stretching B LE's by PT    MT  R LAD 1 min x 3  R hooklying distraction 1 min x 3  R side up STM lumbar paraspinals  R side up lumbar rotational mobs gr. III and IV x 10 bouts     MHP x 5 min    Goals: (to be met in 10 visits)  Patient will demonstrate lumbar flexion within available range with no pain within 6 weeks in order to pick objects from the floor. Progress  Patient will tolerate sitting  for >30' hours with no increase in pain within 6 weeks to improve community involvement.Progress  Patient will demonstrate proper squat form with no increase in pain within 6 weeks in order to lift objects from low surfaces. Progress  Patient will demonstrate improvement in DAGOBERTO score  within 6 weeks in order to improve functional mobility and return to PLOF. Progress  Patient will have subjective pain  2/10. Progress  Pt will have a - Gowers sign with lumbar mobility. Met     HEP: Patient was instructed in and issued a HEP for: Access Code: [email or web address]  URL: [email or web address]  Date: 11/26/2024  Prepared by: Humaira Mccollum    Exercises  - Sidelying Thoracic Rotation with Open Book  - 1 x daily - 7 x weekly - 1 sets - 5 reps  - Clamshell  - 1 x daily - 7 x weekly - 2 sets - 10 reps  - Supine 90/90 Sciatic Nerve Glide with Knee Flexion/Extension  - 1 x daily - 7 x weekly - 1 sets - 10 reps  - Supine Transversus Abdominis Bracing - Hands on Stomach  - 1 x daily - 7 x weekly - 2 sets - 10 reps  - Seated Transversus Abdominis Bracing with PLB  - 1 x daily - 7 x weekly - 2 sets - 10 reps  Education: body mechanics, posture education, joint protection principles, importance of remaining active    Assessment: Tolerated session well. Good response to session. Pain staying in back. Moderate muscle guarding and tightness noted along R > L lumbar paraspinals and QL, responded well the MT.       Plan:Reassess next visit to request additional visits

## 2025-02-19 ENCOUNTER — OFFICE VISIT (OUTPATIENT)
Dept: PHYSICAL THERAPY | Facility: HOSPITAL | Age: 45
End: 2025-02-19
Attending: INTERNAL MEDICINE
Payer: COMMERCIAL

## 2025-02-19 PROCEDURE — 97140 MANUAL THERAPY 1/> REGIONS: CPT

## 2025-02-19 PROCEDURE — 97110 THERAPEUTIC EXERCISES: CPT

## 2025-02-20 NOTE — PROGRESS NOTES
Dear Dana Adams DO, [place],    This letter is to inform you of Alberta Ledezma's Progress in Physical Therapy at [facility].    Dx: Fall, initial encounter (W19.XXXA)  Acute bilateral low back pain with left-sided sciatica (M54.42)         Treatment # __10__ of __10__      Date of service: 12/19/25    ASSESSMENT  Pt. Reports 75% improvement. States pain has centralized to low back. States she now has times with no pain and max 6/10. Has more constant stiffness in back instead of pain. Today pain is 5/10. Pt. States sleep is still disturbed from LBP along with prolonged sitting or walking. Pt. Would benefit from continuation of PT at this time to progress toward below unmet goals. Requesting authorization for 5 more visits, 15 total.     OTHER  Palpation: mild tenderness and tightness R QL, piriformis, and R lumbar paraspinals  Sensation: intact     Lumbar  AROM: (* denotes performed with pain)  Flexion: 0cm  Extension: 10 cm  Sidebending: R 50cm. L 49cm  Rotation: R 60cm, L 60cm    Accessory motion: hypomobility to L3-L5 in prone to Gr 2 pa without change to symptoms     Flexibility:  Hamstrings: R 40; L 35    Strength/MMT: (* denotes performed with pain)  Core: upper abs 4/5, lower abs 4+/5, back ext 4/5  Hip Knee Foot/Ankle   Flexion: R 5/5; L 5/5  Extension: R 4/5; L 4/5  Abduction: R 4/5; L 4/5  ER: R 4/5; L 4/5  IR: R 4/5; L 4/5 Flexion: R 5/5; L 5/5  Extension: R 5/5; L 5/5    DF: R 5/5; L 5/5  PF: R 5/5; L 5/5  INV: R 5/5; L 5/5  EV: R 5/5; L 5/5  Great toe ext: R 5/5; L 5/5     Special tests:   +Dural tension right le   (-)SLR R at 60 deg  (-) R piriformis    Gait: pt ambulates on level ground with mild antalgia  Balance: SLS: R 30 sec, L 30 sec      LONG AND SHORT TERM GOALS  Goals: (to be met in 10 visits)  Patient will demonstrate lumbar flexion within available range with no pain within 6 weeks in order to pick objects from the floor.  Met  Patient will tolerate sitting  for >30' hours with no increase in pain within 6 weeks to improve community involvement. Progress  Patient will demonstrate proper squat form with no increase in pain within 6 weeks in order to lift objects from low surfaces. Progress  Patient will demonstrate improvement in DAGOBERTO score  within 6 weeks in order to improve functional mobility and return to PLOF. Progress  Patient will have subjective pain 2/10. Progress  Pt will have a - Gowers sign with lumbar mobility. Met    RECOMMENDATIONS AND PLAN OF TREATMENT  Requesting O auth for an additional 5 visits, 15 total to progress toward above unmet goals. Treatment to progress therapeutic exercise, manual therapy, NREED, modalities prn, and progressive HEP.     FREQUENCY: 1x/wk    DURATION: 5 weeks    REHAB POTENTIAL:  Good for above goals. Pt. Has been making improvement in overall functional mobility and centralization of pain.     PATIENT/FAMILY/CAREGIVER WAS ADVISED OF THESE FINDINGS, PRECAUTIONS, AND TREATMENT OPTIONS AND HAS AGREED TO ACTIVELY PARTICIPATE IN PLANNING AND FOR THIS COURSE OF CARE.  THANK YOU FOR YOUR REFERRAL. IF YOU HAVE ANY QUESTIONS PLEASE CONTACT ME AT:  [facility]: [phone]    PHYSICIAN CERTIFICATION REQUIRED: yes    SINCERELY,     RICHARD CROWLEY, PT    I CERTIFY THE NEED FOR THESE SERVICES FURNISHED UNDER THIS PLAN OF TREATMENT AND WHILE UNDER MY CARE.     X__________________________________________________________________        DATE:     CERTIFICATION FROM: __________2/19/25 TO ______35/19/25_____________    Thank you for the referral of Alberta Ledezma.  Requesting [email or web address] for 5 additional visits, 15 visits total.     FAX NUMBER: [phone]  [id number]

## 2025-02-26 ENCOUNTER — OFFICE VISIT (OUTPATIENT)
Dept: PHYSICAL THERAPY | Facility: HOSPITAL | Age: 45
End: 2025-02-26
Attending: INTERNAL MEDICINE
Payer: COMMERCIAL

## 2025-02-26 PROCEDURE — 97140 MANUAL THERAPY 1/> REGIONS: CPT

## 2025-02-26 PROCEDURE — 97110 THERAPEUTIC EXERCISES: CPT

## 2025-03-01 NOTE — PROGRESS NOTES
Diagnosis:   Fall, initial encounter (W19.XXXA)  Acute bilateral low back pain with left-sided sciatica (M54.42)      Referring Provider: Shirley Paulino  Date of Evaluation:   11/26/24    Precautions:  None Next MD visit:   none scheduled  Date of Surgery: n/a   Insurance Primary/Secondary[place] / N/A       # Auth Visits: 15   Total Timed Treatment: 45 min  Date POC Expires: 5/1/25   Total Treatment time: 50 min       Charges: TE 2(25) MT (20)       Treatment Number: 11/15    Subjective: Pt. Reports pain about 2/10 in low back. More stiffness than pain. Drove to/from college visit and had no increased back pain.   Pain: 2/10   Objective/Goals: Refer to flow sheet. Emphasis on gentle stretching and  MT to decrease muscle guarding and tightness. Progressed core stabilization with planks this date.    Treatment 11/15    TE  Scifit x 5 min  A/P board x 10  Quad stretch on 8 inch step x 10  Sand dune  *step up x 15  *fwd lunge x 15  *lat lunge x 15  *squat with ball squeeze x 15  *toe/heel raises x 15  Fwd plank on elbows 45 sec hold (work toward 60 sec)  Side planks on elbows 30 sec hold each  Passive stretching B LE's by PT    MT  hooklying distraction 1 min x 3  R side up STM lumbar paraspinals  R side up lumbar rotational mobs gr. III and IV x 10 bouts     MHP x 5 min    Goals: (to be met in 10 visits)  Patient will demonstrate lumbar flexion within available range with no pain within 6 weeks in order to pick objects from the floor. Progress  Patient will tolerate sitting  for >30' hours with no increase in pain within 6 weeks to improve community involvement.Progress  Patient will demonstrate proper squat form with no increase in pain within 6 weeks in order to lift objects from low surfaces. Progress  Patient will demonstrate improvement in DAGOBERTO score  within 6 weeks in order to improve functional mobility and return to PLOF. Progress  Patient will have subjective pain 2/10. Progress  Pt will have a -  Gowers sign with lumbar mobility. Met     HEP: Patient was instructed in and issued a HEP for: Access Code: [email or web address]  URL: [email or web address]  Date: 11/26/2024  Prepared by: Humaira Mccollum    Exercises  - Sidelying Thoracic Rotation with Open Book  - 1 x daily - 7 x weekly - 1 sets - 5 reps  - Clamshell  - 1 x daily - 7 x weekly - 2 sets - 10 reps  - Supine 90/90 Sciatic Nerve Glide with Knee Flexion/Extension  - 1 x daily - 7 x weekly - 1 sets - 10 reps  - Supine Transversus Abdominis Bracing - Hands on Stomach  - 1 x daily - 7 x weekly - 2 sets - 10 reps  - Seated Transversus Abdominis Bracing with PLB  - 1 x daily - 7 x weekly - 2 sets - 10 reps  Education: body mechanics, posture education, joint protection principles, importance of remaining active    Assessment: Tolerated session well. Good response to session. Pain staying in back, no radicular pain. Mild muscle guarding and tightness noted along R > L lumbar paraspinals and QL, responded well the MT.       Plan:Progress core stabilization as tolerated.

## 2025-03-04 ENCOUNTER — APPOINTMENT (OUTPATIENT)
Dept: GENERAL RADIOLOGY | Age: 45
End: 2025-03-04
Attending: PHYSICIAN ASSISTANT
Payer: COMMERCIAL

## 2025-03-04 ENCOUNTER — HOSPITAL ENCOUNTER (OUTPATIENT)
Age: 45
Discharge: HOME | End: 2025-03-04
Payer: COMMERCIAL

## 2025-03-04 ENCOUNTER — TELEPHONE (OUTPATIENT)
Dept: FAMILY MEDICINE CLINIC | Facility: CLINIC | Age: 45
End: 2025-03-04

## 2025-03-04 VITALS
RESPIRATION RATE: 18 BRPM | TEMPERATURE: 99 F | OXYGEN SATURATION: 96 % | DIASTOLIC BLOOD PRESSURE: 70 MMHG | HEART RATE: 95 BPM | SYSTOLIC BLOOD PRESSURE: 112 MMHG

## 2025-03-04 DIAGNOSIS — R50.9 FEVER: Primary | ICD-10-CM

## 2025-03-04 DIAGNOSIS — U07.1 COVID-19: ICD-10-CM

## 2025-03-04 LAB
#MXD IC: 0.7 X10ˆ3/UL (ref 0.1–1)
ATRIAL RATE: 110 BPM
BUN BLD-MCNC: 9 MG/DL (ref 7–18)
CHLORIDE BLD-SCNC: 105 MMOL/L (ref 98–112)
CO2 BLD-SCNC: 22 MMOL/L (ref 21–32)
CREAT BLD-MCNC: 0.9 MG/DL
EGFRCR SERPLBLD CKD-EPI 2021: 81 ML/MIN/1.73M2 (ref 60–?)
GLUCOSE BLD-MCNC: 93 MG/DL (ref 70–99)
HCT VFR BLD AUTO: 38.3 %
HCT VFR BLD CALC: 38 %
HGB BLD-MCNC: 13.6 G/DL
ISTAT IONIZED CALCIUM FOR CHEM 8: 1.18 MMOL/L (ref 1.12–1.32)
LYMPHOCYTES # BLD AUTO: 0.6 X10ˆ3/UL (ref 1–4)
LYMPHOCYTES NFR BLD AUTO: 6.3 %
MCH RBC QN AUTO: 32.2 PG (ref 26–34)
MCHC RBC AUTO-ENTMCNC: 35.5 G/DL (ref 31–37)
MCV RBC AUTO: 90.5 FL (ref 80–100)
MIXED CELL %: 6.7 %
NEUTROPHILS # BLD AUTO: 9 X10ˆ3/UL (ref 1.5–7.7)
NEUTROPHILS NFR BLD AUTO: 87 %
P AXIS: 40 DEGREES
P-R INTERVAL: 160 MS
PLATELET # BLD AUTO: 234 X10ˆ3/UL (ref 150–450)
POCT INFLUENZA A: NEGATIVE
POCT INFLUENZA B: NEGATIVE
POTASSIUM BLD-SCNC: 4 MMOL/L (ref 3.6–5.1)
Q-T INTERVAL: 338 MS
QRS DURATION: 86 MS
QTC CALCULATION (BEZET): 457 MS
R AXIS: 54 DEGREES
RBC # BLD AUTO: 4.23 X10ˆ6/UL
SARS-COV-2 RNA RESP QL NAA+PROBE: DETECTED
SODIUM BLD-SCNC: 139 MMOL/L (ref 136–145)
T AXIS: 7 DEGREES
VENTRICULAR RATE: 110 BPM
WBC # BLD AUTO: 10.3 X10ˆ3/UL (ref 4–11)

## 2025-03-04 PROCEDURE — 93000 ELECTROCARDIOGRAM COMPLETE: CPT | Performed by: PHYSICIAN ASSISTANT

## 2025-03-04 PROCEDURE — 96374 THER/PROPH/DIAG INJ IV PUSH: CPT | Performed by: PHYSICIAN ASSISTANT

## 2025-03-04 PROCEDURE — 99213 OFFICE O/P EST LOW 20 MIN: CPT | Performed by: PHYSICIAN ASSISTANT

## 2025-03-04 PROCEDURE — U0002 COVID-19 LAB TEST NON-CDC: HCPCS | Performed by: PHYSICIAN ASSISTANT

## 2025-03-04 PROCEDURE — 87502 INFLUENZA DNA AMP PROBE: CPT | Performed by: PHYSICIAN ASSISTANT

## 2025-03-04 PROCEDURE — 71046 X-RAY EXAM CHEST 2 VIEWS: CPT | Performed by: PHYSICIAN ASSISTANT

## 2025-03-04 PROCEDURE — 96361 HYDRATE IV INFUSION ADD-ON: CPT | Performed by: PHYSICIAN ASSISTANT

## 2025-03-04 PROCEDURE — 80047 BASIC METABLC PNL IONIZED CA: CPT | Performed by: PHYSICIAN ASSISTANT

## 2025-03-04 PROCEDURE — 85025 COMPLETE CBC W/AUTO DIFF WBC: CPT | Performed by: PHYSICIAN ASSISTANT

## 2025-03-04 PROCEDURE — A9150 MISC/EXPER NON-PRESCRIPT DRU: HCPCS | Performed by: PHYSICIAN ASSISTANT

## 2025-03-04 RX ORDER — KETOROLAC TROMETHAMINE 30 MG/ML
30 INJECTION, SOLUTION INTRAMUSCULAR; INTRAVENOUS ONCE
Status: COMPLETED | OUTPATIENT
Start: 2025-03-04 | End: 2025-03-04

## 2025-03-04 RX ORDER — ACETAMINOPHEN 500 MG
1000 TABLET ORAL ONCE
Status: COMPLETED | OUTPATIENT
Start: 2025-03-04 | End: 2025-03-04

## 2025-03-04 NOTE — ED PROVIDER NOTES
Patient Seen in: [facility]      History     Chief Complaint   Patient presents with    Cough/URI    Fever     Stated Complaint: Cough - Shortness of breath when I try to cough. Cant produce anything, terribl*    Subjective:   HPI    45 yo female presenting for evaluation of shortness of breath, cough.  Today evening.  Patient states she had chills before she went to bed and woke up around 2:00 in the morning with fever.  She states she has headache, fatigue and voice hoarseness.  Last dose of Tylenol was overnight.  She states she has chest congestion and tightness but denies pain.    Objective:     Past Medical History:    Abdominal distention    Abdominal pain    Anemia    Anxiety    Arthritis    Back pain    Back problem    Bad breath    Belching    Bleeding nose    Bloating    Blood in the stool    Cervicalgia    Change in hair    Chest pain    Constipation    Depression    Depressive disorder, not elsewhere classified    Diarrhea, unspecified    Dizziness    Dysmenorrhea    Dysphonia    Easy bruising    Fatigue    Flatulence/gas pain/belching    Frequent urination    Headache disorder    Heart palpitations    Heartburn    Hemorrhoids    High cholesterol    History of depression    History of eating disorder    Irregular bowel habits    Stool not forming    Itch of skin    After hospitalized for neutropenia    Leaking of urine    Major depressive disorder, recurrent episode, mild    Mouth sores    Nausea    Night sweats    Pain in joint, pelvic region and thigh    Pain in joints    Pain with bowel movements    Palpitations    Post partum depression    hx w/1st pregnancy    Rash    Rhinitis, allergic    RLS (restless legs syndrome)    Shortness of breath    Sinusitis    Sjogren's disease (HCC)    Skin blushing/flushing    Rosacea    Sleep apnea    cpap    Sleep disturbance    Somnambulance    daytime    Stool incontinence    Stress    Urinary incontinence    Wears glasses    Weight gain     Wheezing              Past Surgical History:   Procedure Laterality Date    Back surgery      Bone marrow aspirate &biopsy  08/05/2021    Colonoscopy      D & c      Egd      Oral surgery procedure      wisdom tooth resection    Other  07/30/2020    ablation uterine    Other  06/20/2022    LEFT LUMBAR 4 - LUMBAR 5 FRAGMENTECTOMY AND MICRODISCECTOMY AND ALL INDICATED PROCEDURES    Spine surgery procedure unlisted  June 20, 2022                Social History     Socioeconomic History    Marital status:    Tobacco Use    Smoking status: Never    Smokeless tobacco: Never   Vaping Use    Vaping status: Never Used   Substance and Sexual Activity    Alcohol use: Yes     Alcohol/week: 5.0 standard drinks of alcohol     Types: 5 Shots of liquor per week     Comment: 2 glasses, 2-4 days a week    Drug use: Never    Sexual activity: Not Currently   Other Topics Concern    Caffeine Concern Yes     Comment: coffee, tea, soda    Exercise Yes     Comment: 4 times per week    Seat Belt Yes   Social History Narrative    The patient does not use an assistive device..      The patient does live in a home with stairs.     Social Drivers of Health     Food Insecurity: No Food Insecurity (2/25/2025)    NCSS - Food Insecurity     Worried About Running Out of Food in the Last Year: No     Ran Out of Food in the Last Year: No   Housing Stability: Not At Risk (2/25/2025)    NCSS - Housing/Utilities     Has Housing: Yes     Worried About Losing Housing: No     Unable to Get Utilities: No              Review of Systems    Positive for stated complaint: Cough - Shortness of breath when I try to cough. Cant produce anything, terribl*  Other systems are as noted in HPI.  Constitutional and vital signs reviewed.      All other systems reviewed and negative except as noted above.    Physical Exam     ED Triage Vitals [03/04/25 1227]   BP (!) 158/100   Pulse 118   Resp 18   Temp (!) 100.9 °F (38.3 °C)   Temp src Oral   SpO2 95 %   O2 Device  None (Room air)       Current Vitals:   Vital Signs  BP: 112/70  Pulse: 95  Resp: 18  Temp: 98.8 °F (37.1 °C)  Temp src: Oral    Oxygen Therapy  SpO2: 96 %  O2 Device: None (Room air)        Physical Exam  Vitals and nursing note reviewed.   Constitutional:       General: She is not in acute distress.  HENT:      Head: Normocephalic and atraumatic.      Right Ear: External ear normal.      Left Ear: External ear normal.      Nose: Nose normal.      Mouth/Throat:      Mouth: Mucous membranes are moist.   Eyes:      Extraocular Movements: Extraocular movements intact.      Pupils: Pupils are equal, round, and reactive to light.   Cardiovascular:      Rate and Rhythm: Normal rate.   Pulmonary:      Effort: Pulmonary effort is normal.   Abdominal:      General: Abdomen is flat.   Musculoskeletal:         General: Normal range of motion.      Cervical back: Normal range of motion.   Skin:     General: Skin is warm.   Neurological:      General: No focal deficit present.      Mental Status: She is alert and oriented to person, place, and time.   Psychiatric:         Mood and Affect: Mood normal.         Behavior: Behavior normal.             ED Course     Labs Reviewed   POCT CBC - Abnormal; Notable for the following components:       Result Value    # Neutrophil 9.0 (*)     # Lymphocyte 0.6 (*)     All other components within normal limits   RAPID SARS-COV-2 BY PCR - Abnormal; Notable for the following components:    Rapid SARS-CoV-2 by PCR Detected (*)     All other components within normal limits   POCT ISTAT CHEM8 CARTRIDGE - Normal   POCT FLU TEST - Normal    Narrative:     This assay is a rapid molecular in vitro test utilizing nucleic acid amplification of influenza A and B viral RNA.        44-year-old female presents with URI symptoms which started overnight.  Patient with low-grade fever in the IC, heart rate 118.  Oxygen saturation is 95%.  Blood pressure also elevated  Ddx-influenza, COVID, pneumonia    Test  is positive.  She continues to have elevated heart rate in the 120s to 130s although no complaint of chest pain or shortness of breath    She is agreeable to basic labs,EKG    Rate, intervals and axes as noted on EKG Report.  Rate: 110  Rhythm: Sinus Rhythm  Reading: sinus Tachycardia, heart rate 110.  No arrhythmia, no ectopy.  No STEMI    After Toradol, patient had some water and notes significant improvement in her symptoms.  Heart rate improved, 95 bpm, now afebrile with improved blood pressure as well.    We reviewed isolation and quarantine guidelines, supportive care and return precautions.         [facility]              Medical Decision Making      Disposition and Plan     Clinical Impression:  1. Fever    2. COVID-19         Disposition:  Discharge  3/4/2025  3:14 pm    Follow-up:  Dana Adams DO  [address] [place] [id number]  [phone]                Medications Prescribed:  Discharge Medication List as of 3/4/2025  3:17 PM              Supplementary Documentation:

## 2025-03-04 NOTE — TELEPHONE ENCOUNTER
PT created OMW for cough \"struggling to breathe at times.\" Called and spoke with patient. She states she is not actively short of breath, states was referring to coughing fits that come on suddenly. Also believes she has a fever. She is wanting rapid flu testing. Advised pt I am happy to see her at this location if not having respiratory distress, however rapid flu testing not available at this location, can treat empirically and send test to lab with results in 1-2 days. Pt states she would like to cancel [facility] appt, will go to IC as she would prefer rapid testing.

## 2025-03-04 NOTE — ED INITIAL ASSESSMENT (HPI)
Pt with c/o cough that started last night.  Pt with chills before bed.  Pt states woke up around 2am with bunny.  Pt now with headache, fatigue, cough and hoarse voice.  Pt had tylenol at 2am.  Fever started this morning

## 2025-03-05 ENCOUNTER — APPOINTMENT (OUTPATIENT)
Dept: PHYSICAL THERAPY | Facility: HOSPITAL | Age: 45
End: 2025-03-05
Attending: INTERNAL MEDICINE
Payer: COMMERCIAL

## 2025-03-12 ENCOUNTER — OFFICE VISIT (OUTPATIENT)
Dept: PHYSICAL THERAPY | Facility: HOSPITAL | Age: 45
End: 2025-03-12
Attending: INTERNAL MEDICINE
Payer: COMMERCIAL

## 2025-03-12 PROCEDURE — 97140 MANUAL THERAPY 1/> REGIONS: CPT

## 2025-03-12 PROCEDURE — 97110 THERAPEUTIC EXERCISES: CPT

## 2025-03-16 NOTE — PROGRESS NOTES
Diagnosis:   Fall, initial encounter (W19.XXXA)  Acute bilateral low back pain with left-sided sciatica (M54.42)      Referring Provider: Shirley Paulino  Date of Evaluation:   11/26/24    Precautions:  None Next MD visit:   none scheduled  Date of Surgery: n/a   Insurance Primary/Secondary: [place] / N/A       # Auth Visits: 15   Total Timed Treatment: 45 min  Date POC Expires: 5/1/25   Total Treatment time: 50 min       Charges: TE 2(25) MT (20)       Treatment Number: 12/15    Subjective: Pt. Reports pain about 2/10 in low back. More stiffness than pain.   Pain: 2/10   Objective/Goals: Refer to flow sheet. Emphasis on gentle stretching and  MT to decrease muscle guarding and tightness.     Treatment 12/15    TE  Scifit x 5 min  A/P board x 10  Quad stretch on 8 inch step x 10  Sand dune  *step up x 15  *fwd lunge x 15  *lat lunge x 15  *squat with ball squeeze x 15  *toe/heel raises x 15  Passive stretching B LE's by PT    MT  hooklying distraction 1 min x 3  R side up STM lumbar paraspinals  R side up lumbar rotational mobs gr. III and IV x 10 bouts     MHP x 5 min    Goals: (to be met in 10 visits)  Patient will demonstrate lumbar flexion within available range with no pain within 6 weeks in order to pick objects from the floor. Progress  Patient will tolerate sitting  for >30' hours with no increase in pain within 6 weeks to improve community involvement.Progress  Patient will demonstrate proper squat form with no increase in pain within 6 weeks in order to lift objects from low surfaces. Progress  Patient will demonstrate improvement in DAGOBERTO score  within 6 weeks in order to improve functional mobility and return to PLOF. Progress  Patient will have subjective pain 2/10. Progress  Pt will have a - Gowers sign with lumbar mobility. Met     HEP: Patient was instructed in and issued a HEP for: Access Code: [email or web address]  URL: [email or web address]  Date: 11/26/2024  Prepared by: Humaira  Chun    Exercises  - Sidelying Thoracic Rotation with Open Book  - 1 x daily - 7 x weekly - 1 sets - 5 reps  - Clamshell  - 1 x daily - 7 x weekly - 2 sets - 10 reps  - Supine 90/90 Sciatic Nerve Glide with Knee Flexion/Extension  - 1 x daily - 7 x weekly - 1 sets - 10 reps  - Supine Transversus Abdominis Bracing - Hands on Stomach  - 1 x daily - 7 x weekly - 2 sets - 10 reps  - Seated Transversus Abdominis Bracing with PLB  - 1 x daily - 7 x weekly - 2 sets - 10 reps  Education: body mechanics, posture education, joint protection principles, importance of remaining active    Assessment: Tolerated session well. Good response to session. Pain staying in back, no radicular pain. Mild muscle guarding and tightness noted along R > L lumbar paraspinals and QL, responded well the MT.       Plan:Progress core stabilization as tolerated.

## 2025-03-19 ENCOUNTER — OFFICE VISIT (OUTPATIENT)
Dept: PHYSICAL THERAPY | Facility: HOSPITAL | Age: 45
End: 2025-03-19
Attending: INTERNAL MEDICINE
Payer: COMMERCIAL

## 2025-03-19 PROCEDURE — 97140 MANUAL THERAPY 1/> REGIONS: CPT

## 2025-03-19 PROCEDURE — 97110 THERAPEUTIC EXERCISES: CPT

## 2025-03-19 NOTE — PROGRESS NOTES
Diagnosis:   Fall, initial encounter (W19.XXXA)  Acute bilateral low back pain with left-sided sciatica (M54.42)      Referring Provider: Shirley Paulino  Date of Evaluation:   11/26/24    Precautions:  None Next MD visit:   none scheduled  Date of Surgery: n/a   Insurance Primary/Secondary[place] / N/A       # Auth Visits: 15   Total Timed Treatment: 45 min  Date POC Expires: 5/1/25   Total Treatment time: 50 min       Charges: TE 2(25) MT (20)       Treatment Number: 13/15    Subjective: Pt. Reports pain about 2/10 in low back. Feels like she is limping today because of the back pain.   Pain: 2/10   Objective/Goals: Refer to flow sheet. Emphasis on core strengthening, stretching and  MT to decrease muscle guarding and tightness.     Treatment 13/15    TE  Scifit x 5 min  A/P board x 10  Quad stretch on 8 inch step x 10  Sand dune  *step up x 15  *fwd lunge x 15  *lat lunge x 15  *squat x 15  *toe/heel raises x 15  Seated on SB  *pelvic circles cw/ccw x 10  *march x 10  *knee ext x 10  *opp arm/leg x 10  *seated flex stretch x 10  Passive stretching B LE's by PT    MT  hooklying distraction 1 min x 3  R side up STM lumbar paraspinals  R side up lumbar rotational mobs gr. III and IV x 10 bouts     MHP x 5 min    Goals: (to be met in 10 visits)  Patient will demonstrate lumbar flexion within available range with no pain within 6 weeks in order to pick objects from the floor. Progress  Patient will tolerate sitting  for >30' hours with no increase in pain within 6 weeks to improve community involvement.Progress  Patient will demonstrate proper squat form with no increase in pain within 6 weeks in order to lift objects from low surfaces. Progress  Patient will demonstrate improvement in DAGOBERTO score  within 6 weeks in order to improve functional mobility and return to PLOF. Progress  Patient will have subjective pain 2/10. Progress  Pt will have a - Gowers sign with lumbar mobility. Met     HEP: Patient was  instructed in and issued a HEP for: Access Code: [email or web address]  URL: [email or web address]  Date: 11/26/2024  Prepared by: Humaira Mccollum    Exercises  - Sidelying Thoracic Rotation with Open Book  - 1 x daily - 7 x weekly - 1 sets - 5 reps  - Clamshell  - 1 x daily - 7 x weekly - 2 sets - 10 reps  - Supine 90/90 Sciatic Nerve Glide with Knee Flexion/Extension  - 1 x daily - 7 x weekly - 1 sets - 10 reps  - Supine Transversus Abdominis Bracing - Hands on Stomach  - 1 x daily - 7 x weekly - 2 sets - 10 reps  - Seated Transversus Abdominis Bracing with PLB  - 1 x daily - 7 x weekly - 2 sets - 10 reps  Education: body mechanics, posture education, joint protection principles, importance of remaining active    Assessment: Tolerated session well. Good response to session. Pain staying in back, no radicular pain, responding well the MT.       Plan:Progress core stabilization as tolerated.

## 2025-03-20 ENCOUNTER — TELEPHONE (OUTPATIENT)
Age: 45
End: 2025-03-20

## 2025-04-01 ENCOUNTER — TELEPHONE (OUTPATIENT)
Age: 45
End: 2025-04-01

## 2025-04-07 ENCOUNTER — OFFICE VISIT (OUTPATIENT)
Dept: INTERNAL MEDICINE CLINIC | Facility: CLINIC | Age: 45
End: 2025-04-07
Payer: COMMERCIAL

## 2025-04-07 VITALS
WEIGHT: 241 LBS | BODY MASS INDEX: 40.15 KG/M2 | DIASTOLIC BLOOD PRESSURE: 78 MMHG | HEIGHT: 65 IN | SYSTOLIC BLOOD PRESSURE: 118 MMHG | RESPIRATION RATE: 18 BRPM | HEART RATE: 88 BPM

## 2025-04-07 DIAGNOSIS — R53.83 OTHER FATIGUE: ICD-10-CM

## 2025-04-07 DIAGNOSIS — F50.819 BINGE EATING DISORDER, UNSPECIFIED SEVERITY: ICD-10-CM

## 2025-04-07 DIAGNOSIS — E66.01 MORBID (SEVERE) OBESITY DUE TO EXCESS CALORIES (HCC): ICD-10-CM

## 2025-04-07 DIAGNOSIS — Z51.81 THERAPEUTIC DRUG MONITORING: ICD-10-CM

## 2025-04-07 DIAGNOSIS — G47.33 OBSTRUCTIVE SLEEP APNEA SYNDROME: Primary | ICD-10-CM

## 2025-04-07 DIAGNOSIS — E55.9 VITAMIN D DEFICIENCY: ICD-10-CM

## 2025-04-07 DIAGNOSIS — E53.8 VITAMIN B12 DEFICIENCY: ICD-10-CM

## 2025-04-07 PROCEDURE — 3008F BODY MASS INDEX DOCD: CPT | Performed by: NURSE PRACTITIONER

## 2025-04-07 PROCEDURE — 3074F SYST BP LT 130 MM HG: CPT | Performed by: NURSE PRACTITIONER

## 2025-04-07 PROCEDURE — 3078F DIAST BP <80 MM HG: CPT | Performed by: NURSE PRACTITIONER

## 2025-04-07 PROCEDURE — 99214 OFFICE O/P EST MOD 30 MIN: CPT | Performed by: NURSE PRACTITIONER
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
## 2025-04-08 ENCOUNTER — TELEPHONE (OUTPATIENT)
Dept: SURGERY | Facility: CLINIC | Age: 45
End: 2025-04-08

## 2025-04-08 ENCOUNTER — TELEPHONE (OUTPATIENT)
Dept: INTERNAL MEDICINE CLINIC | Facility: CLINIC | Age: 45
End: 2025-04-08

## 2025-04-08 RX ORDER — PHENTERMINE HYDROCHLORIDE 37.5 MG/1
37.5 TABLET ORAL
Qty: 30 TABLET | Refills: 2 | Status: SHIPPED | OUTPATIENT
Start: 2025-04-08

## 2025-04-08 NOTE — TELEPHONE ENCOUNTER
Called Valentina Kennedy to get pt scheduled with Dr. Campos for bariatric surgery. Pt is interested in the gastric sleeve.

## 2025-04-09 ENCOUNTER — OFFICE VISIT (OUTPATIENT)
Dept: PHYSICAL THERAPY | Facility: HOSPITAL | Age: 45
End: 2025-04-09
Attending: INTERNAL MEDICINE
Payer: COMMERCIAL

## 2025-04-09 ENCOUNTER — OFFICE VISIT (OUTPATIENT)
Dept: FAMILY MEDICINE CLINIC | Facility: CLINIC | Age: 45
End: 2025-04-09
Payer: COMMERCIAL

## 2025-04-09 VITALS
DIASTOLIC BLOOD PRESSURE: 84 MMHG | WEIGHT: 241 LBS | OXYGEN SATURATION: 95 % | SYSTOLIC BLOOD PRESSURE: 124 MMHG | BODY MASS INDEX: 40.15 KG/M2 | RESPIRATION RATE: 18 BRPM | HEIGHT: 65 IN | HEART RATE: 92 BPM

## 2025-04-09 DIAGNOSIS — Z71.89 ENCOUNTER FOR PRE-BARIATRIC SURGERY COUNSELING AND EDUCATION: Primary | ICD-10-CM

## 2025-04-09 DIAGNOSIS — Z12.31 ENCOUNTER FOR SCREENING MAMMOGRAM FOR HIGH-RISK PATIENT: ICD-10-CM

## 2025-04-09 PROCEDURE — 90471 IMMUNIZATION ADMIN: CPT | Performed by: FAMILY MEDICINE

## 2025-04-09 PROCEDURE — 3079F DIAST BP 80-89 MM HG: CPT | Performed by: FAMILY MEDICINE

## 2025-04-09 PROCEDURE — 3008F BODY MASS INDEX DOCD: CPT | Performed by: FAMILY MEDICINE

## 2025-04-09 PROCEDURE — 97140 MANUAL THERAPY 1/> REGIONS: CPT

## 2025-04-09 PROCEDURE — 97110 THERAPEUTIC EXERCISES: CPT

## 2025-04-09 PROCEDURE — 3074F SYST BP LT 130 MM HG: CPT | Performed by: FAMILY MEDICINE

## 2025-04-09 PROCEDURE — 90715 TDAP VACCINE 7 YRS/> IM: CPT | Performed by: FAMILY MEDICINE

## 2025-04-09 PROCEDURE — 99214 OFFICE O/P EST MOD 30 MIN: CPT | Performed by: FAMILY MEDICINE

## 2025-04-09 NOTE — PROGRESS NOTES
The following individual(s) verbally consented to be recorded using ambient AI listening technology and understand that they can each withdraw their consent to this listening technology at any point by asking the clinician to turn off or pause the recording:    Patient name: Alberta Ledezma  Additional names:        Alberta Ledezma is a 44 year old female who presents for    History of Present Illness  Alberta Ledezma is a 44 year old female who presents for pre-surgical clearance for bariatric surgery. She was referred by Doctor Maki for pre-surgical clearance.    She is in the process of obtaining pre-surgical clearance for bariatric surgery, which requires approval from multiple specialists, including a pulmonologist, dietitian, and cardiologist. She has been under the care of Doctor Medrano for weight management for several years but has not consulted a dietitian in over a year. Her previous appointments with the dietitian and pulmonologist were canceled due to richard COVID-19 in March, and she is currently rescheduling them.    She underwent a colonoscopy and endoscopy last year and requires a follow-up appointment with her gastroenterologist for clearance. She has a referral for a cardiologist but has not scheduled an appointment yet due to uncertainty about network coverage.    She has experienced issues with her previous pulmonologist related to disagreements over her weight loss medication, which affected her sleep. She is seeking a new pulmonologist closer to her location, as her current appointment is scheduled for May in [place].    She had neutropenia a couple of years ago, necessitating 15 days off work. She is also overdue for a tetanus shot, mammogram, and Pap smear, as her previous gynecologist retired.         Current Outpatient Medications   Medication Sig Dispense Refill    Phentermine HCl 37.5 MG Oral Tab Take 1 tablet (37.5 mg total) by mouth every morning before  breakfast. 30 tablet 2    ALPRAZolam 0.5 MG Oral Tablet Dispersible Take 1 tablet (0.5 mg total) by mouth daily as needed for Anxiety (panic attacks). 20 tablet 0    Doxycycline Hyclate 20 MG Oral Tab Take 1 tablet (20 mg total) by mouth as needed.      Ciclopirox 1 % External Shampoo       Fluocinonide 0.05 % External Solution       OXYBUTYNIN ER 5 MG Oral Tablet 24 Hr TAKE 1 TABLET(5 MG) BY MOUTH DAILY 90 tablet 0      Past Medical History:    Abdominal distention    Abdominal pain    Anemia    Anxiety    Arthritis    Back pain    Back problem    Bad breath    Belching    Bleeding nose    Bloating    Blood in the stool    Cervicalgia    Change in hair    Chest pain    Constipation    Depression    Depressive disorder, not elsewhere classified    Diarrhea, unspecified    Dizziness    Dysmenorrhea    Dysphonia    Easy bruising    Fatigue    Flatulence/gas pain/belching    Frequent urination    Headache disorder    Heart palpitations    Heartburn    Hemorrhoids    High cholesterol    History of depression    History of eating disorder    Irregular bowel habits    Stool not forming    Itch of skin    After hospitalized for neutropenia    Leaking of urine    Major depressive disorder, recurrent episode, mild    Mouth sores    Nausea    Night sweats    Pain in joint, pelvic region and thigh    Pain in joints    Pain with bowel movements    Palpitations    Post partum depression    hx w/1st pregnancy    Rash    Rhinitis, allergic    RLS (restless legs syndrome)    Shortness of breath    Sinusitis    Sjogren's disease (HCC)    Skin blushing/flushing    Rosacea    Sleep apnea    cpap    Sleep disturbance    Somnambulance    daytime    Stool incontinence    Stress    Urinary incontinence    Wears glasses    Weight gain    Wheezing      Past Surgical History:   Procedure Laterality Date    Back surgery      Bone marrow aspirate &biopsy  08/05/2021    Colonoscopy      D & c      Egd      Oral surgery procedure      virgilio  tooth resection    Other  07/30/2020    ablation uterine    Other  06/20/2022    LEFT LUMBAR 4 - LUMBAR 5 FRAGMENTECTOMY AND MICRODISCECTOMY AND ALL INDICATED PROCEDURES    Spine surgery procedure unlisted  June 20, 2022      Family History   Problem Relation Age of Onset    Heart Attack Father     Hypertension Mother     Hypertension Maternal Grandmother     Cancer Paternal Grandfather     Breast Cancer Maternal Aunt       Social History:  Social History     Socioeconomic History    Marital status:    Tobacco Use    Smoking status: Never    Smokeless tobacco: Never   Vaping Use    Vaping status: Never Used   Substance and Sexual Activity    Alcohol use: Yes     Alcohol/week: 5.0 standard drinks of alcohol     Types: 5 Shots of liquor per week     Comment: 2 glasses, 2-4 days a week    Drug use: Never    Sexual activity: Not Currently   Other Topics Concern    Caffeine Concern Yes     Comment: coffee, tea, soda    Exercise Yes     Comment: 4 times per week    Seat Belt Yes   Social History Narrative    The patient does not use an assistive device..      The patient does live in a home with stairs.     Social Drivers of Health     Food Insecurity: No Food Insecurity (2/25/2025)    NCSS - Food Insecurity     Worried About Running Out of Food in the Last Year: No     Ran Out of Food in the Last Year: No   Housing Stability: Not At Risk (2/25/2025)    NCSS - Housing/Utilities     Has Housing: Yes     Worried About Losing Housing: No     Unable to Get Utilities: No      Occ: . :   Working 2 jobs .   Exercise: minimal.  Diet: watches minimally     REVIEW OF SYSTEMS:   GENERAL: feels well otherwise  SKIN: denies any unusual skin lesions  EYES:denies blurred vision or double vision  HEENT: denies nasal congestion, sinus pain or ST  LUNGS: denies shortness of breath with exertion  CARDIOVASCULAR: denies chest pain on exertion  GI: denies abdominal pain,denies heartburn  : denies nocturia or changes in  stream  MUSCULOSKELETAL: back pain  NEURO: denies headaches  PSYCHE: denies depression or anxiety  HEMATOLOGIC: denies hx of anemia  ENDOCRINE: denies thyroid history  ALL/ASTHMA: denies asthma    EXAM:   /84   Pulse 92   Resp 18   Ht 5' 5\" (1.651 m)   Wt 241 lb (109.3 kg)   SpO2 95%   BMI 40.10 kg/m²   Body mass index is 40.1 kg/m².   GENERAL: well developed, well nourished,in no apparent distress  SKIN: no rashes,no suspicious lesions  HEENT: atraumatic, normocephalic,ears and throat are clear  EYES:PERRLA, EOMI, normal optic disk,conjunctiva are clear  NECK: supple,no adenopathy,no bruits, no JVD  CHEST: no chest tenderness  BREAST: no dominant or suspicious mass  LUNGS: clear to auscultation  CARDIO: RRR without murmur  EXTREMITIES: no cyanosis, clubbing or edema  NEURO: Oriented times three,cranial nerves are intact,motor and sensory are grossly intact    ASSESSMENT AND PLAN:   Alberta Ledezma is a 44 year old female who presents for   Assessment & Plan  Preoperative clearance for bariatric surgery  She is undergoing preoperative clearance for bariatric surgery, requiring consultations with a dietitian, pulmonologist, cardiologist, and gastroenterologist. She completed a class with the surgeon, but the process is delayed due to COVID-19 and insurance requirements. She has not yet met with the surgeon, Dr. Collazo, as the referral is pending completion of these appointments.  - Refer to dietitian for preoperative evaluation  - Refer to pulmonologist for preoperative evaluation  - Refer to cardiologist for preoperative evaluation  - Refer to gastroenterologist for follow-up and preoperative evaluation  - Refer to therapist for mental health clearance  - Administer tetanus booster    Overdue mammogram and Pap smear  She is overdue for a mammogram and Pap smear, having not seen a gynecologist since her previous provider retired.  - Schedule mammogram  - Schedule Pap smear    Tetanus booster  due  She is due for a tetanus booster, recommended every ten years, especially important prior to surgery.  - Administer tetanus booster in the nondominant arm    1. Encounter for pre-bariatric surgery counseling and education    - Gastro Referral - In Network  - DIETITIAN EDUCATION INITIAL, DIET (INTERNAL)  - Pulmonary Referral - In Network  - Cardio Referral - Internal  - [facility] Referral - In Network  - SURGICAL BARIATRICS - INTERNAL    2. BMI 40.0-44.9, adult (HCC)    - Gastro Referral - In Network  - DIETITIAN EDUCATION INITIAL, DIET (INTERNAL)  - Pulmonary Referral - In Network  - Cardio Referral - Internal  - [facility] Referral - In Network  - SURGICAL BARIATRICS - INTERNAL    3. Encounter for screening mammogram for high-risk patient    - [facility] FREDY 2D+3D SCREENING BILAT (CPT=77067/35857); Future          Pt is low-moderate risk for a moderate risk procedure.   RTC 3 months or sooner if needed.

## 2025-04-10 NOTE — PROGRESS NOTES
Diagnosis:   Fall, initial encounter (W19.XXXA)  Acute bilateral low back pain with left-sided sciatica (M54.42)      Referring Provider: Shirley Paulino  Date of Evaluation:   11/26/24    Precautions:  None Next MD visit:   none scheduled  Date of Surgery: n/a   Insurance Primary/Secondary: [employer] / N/A       # Auth Visits: 15   Total Timed Treatment: 45 min  Date POC Expires: 5/1/25   Total Treatment time: 50 min       Charges: TE 2(25) MT (20)       Treatment Number: 14/15    Subjective: Pt. Returns from spring breaks. States the driving 7.5 hours did give her some back tightness and discomfort. But no radicular pain down LE.   Pain: 2/10   Objective/Goals: Refer to flow sheet. Emphasis on core strengthening, stretching and  MT to decrease muscle guarding and tightness.     Treatment 14/15    TE  Scifit x 5 min  A/P board x 10  Quad stretch on 8 inch step x 10  Sand dune  *step up x 15  *fwd lunge x 15  *lat lunge x 15  *squat x 15  *toe/heel raises x 15  Fwd plank x 60 sec  Side plank x 30 sec  Seated on SB  *pelvic circles cw/ccw x 10  *march x 10  *knee ext x 10  *opp arm/leg x 10  *seated flex stretch x 10  Passive stretching B LE's by PT    MT  hooklying distraction 1 min x 3  R side up STM lumbar paraspinals  R side up lumbar rotational mobs gr. III and IV x 10 bouts     MHP x 5 min    Goals: (to be met in 10 visits)  Patient will demonstrate lumbar flexion within available range with no pain within 6 weeks in order to pick objects from the floor. Progress  Patient will tolerate sitting  for >30' hours with no increase in pain within 6 weeks to improve community involvement.Progress  Patient will demonstrate proper squat form with no increase in pain within 6 weeks in order to lift objects from low surfaces. Progress  Patient will demonstrate improvement in DAGOBERTO score  within 6 weeks in order to improve functional mobility and return to PLOF. Progress  Patient will have subjective pain 2/10.  Progress  Pt will have a - Gowers sign with lumbar mobility. Met     HEP: Patient was instructed in and issued a HEP for: Access Code: [email or web address]  URL: [email or web address]  Date: 11/26/2024  Prepared by: Humaira Mccollum    Exercises  - Sidelying Thoracic Rotation with Open Book  - 1 x daily - 7 x weekly - 1 sets - 5 reps  - Clamshell  - 1 x daily - 7 x weekly - 2 sets - 10 reps  - Supine 90/90 Sciatic Nerve Glide with Knee Flexion/Extension  - 1 x daily - 7 x weekly - 1 sets - 10 reps  - Supine Transversus Abdominis Bracing - Hands on Stomach  - 1 x daily - 7 x weekly - 2 sets - 10 reps  - Seated Transversus Abdominis Bracing with PLB  - 1 x daily - 7 x weekly - 2 sets - 10 reps  Education: body mechanics, posture education, joint protection principles, importance of remaining active    Assessment: Progressing well toward goals. No pain at end of session. Improved lumbar mobility and core stability.       Plan:Reassess next visit. Review/update HEP for discharge.

## 2025-04-11 ENCOUNTER — TELEPHONE (OUTPATIENT)
Dept: FAMILY MEDICINE CLINIC | Facility: CLINIC | Age: 45
End: 2025-04-11

## 2025-04-11 NOTE — TELEPHONE ENCOUNTER
I called the patient to schedule [facility] referral, patient asking if this is required before her surgery. Please advise.

## 2025-04-16 ENCOUNTER — OFFICE VISIT (OUTPATIENT)
Dept: PHYSICAL THERAPY | Facility: HOSPITAL | Age: 45
End: 2025-04-16
Attending: INTERNAL MEDICINE
Payer: COMMERCIAL

## 2025-04-16 PROCEDURE — 97110 THERAPEUTIC EXERCISES: CPT

## 2025-04-19 ENCOUNTER — HOSPITAL ENCOUNTER (EMERGENCY)
Age: 45
Discharge: HOME | End: 2025-04-19
Attending: EMERGENCY MEDICINE
Payer: COMMERCIAL

## 2025-04-19 VITALS
BODY MASS INDEX: 39.99 KG/M2 | TEMPERATURE: 99 F | SYSTOLIC BLOOD PRESSURE: 163 MMHG | HEIGHT: 65 IN | OXYGEN SATURATION: 98 % | HEART RATE: 92 BPM | RESPIRATION RATE: 20 BRPM | DIASTOLIC BLOOD PRESSURE: 98 MMHG | WEIGHT: 240 LBS

## 2025-04-19 DIAGNOSIS — R06.2 WHEEZING: Primary | ICD-10-CM

## 2025-04-19 PROCEDURE — 99284 EMERGENCY DEPT VISIT MOD MDM: CPT

## 2025-04-19 PROCEDURE — 93005 ELECTROCARDIOGRAM TRACING: CPT

## 2025-04-19 PROCEDURE — 93010 ELECTROCARDIOGRAM REPORT: CPT

## 2025-04-19 RX ORDER — ALBUTEROL SULFATE 90 UG/1
2 INHALANT RESPIRATORY (INHALATION) EVERY 4 HOURS PRN
Qty: 1 EACH | Refills: 0 | Status: SHIPPED | OUTPATIENT
Start: 2025-04-19 | End: 2025-05-19

## 2025-04-19 RX ORDER — CETIRIZINE HYDROCHLORIDE 10 MG/1
10 TABLET ORAL DAILY
Qty: 30 TABLET | Refills: 0 | Status: SHIPPED | OUTPATIENT
Start: 2025-04-19 | End: 2025-05-19

## 2025-04-20 LAB
ATRIAL RATE: 84 BPM
P AXIS: 76 DEGREES
P-R INTERVAL: 154 MS
Q-T INTERVAL: 356 MS
QRS DURATION: 92 MS
QTC CALCULATION (BEZET): 420 MS
R AXIS: 60 DEGREES
T AXIS: 44 DEGREES
VENTRICULAR RATE: 84 BPM

## 2025-04-20 NOTE — ED INITIAL ASSESSMENT (HPI)
Patient here with episodes of difficulty breathing over the past two weeks. States the episodes occur with a sensation in her throat and chest tightness. Improved taking family members albuterol.

## 2025-04-20 NOTE — ED PROVIDER NOTES
Patient Seen in: [facility] [place]      History     Chief Complaint   Patient presents with    Difficulty Breathing     Stated Complaint: generalized itching, shortness of breath at home over the past two weeks    Subjective:   HPI    44-year-old female past medical history as below presents with itching and intermittent wheezing.  Started over the last several weeks.  She notices occasional itching to her skin and usually tries to take an Allegra or Benadryl which helps.  She has had intermittent episodes of wheezing and has used her 's albuterol inhaler with relief.  No cough or fevers.  No leg swelling or pain.  No lip swelling, tongue swelling.  No new exposures that she can recall at this time.    History of Present Illness               Objective:     Past Medical History:    Abdominal distention    Abdominal pain    Anemia    Anxiety    Arthritis    Back pain    Back problem    Bad breath    Belching    Bleeding nose    Bloating    Blood in the stool    Cervicalgia    Change in hair    Chest pain    Constipation    Depression    Depressive disorder, not elsewhere classified    Diarrhea, unspecified    Dizziness    Dysmenorrhea    Dysphonia    Easy bruising    Fatigue    Flatulence/gas pain/belching    Frequent urination    Headache disorder    Heart palpitations    Heartburn    Hemorrhoids    High cholesterol    History of depression    History of eating disorder    Irregular bowel habits    Stool not forming    Itch of skin    After hospitalized for neutropenia    Leaking of urine    Major depressive disorder, recurrent episode, mild    Mouth sores    Nausea    Night sweats    Pain in joint, pelvic region and thigh    Pain in joints    Pain with bowel movements    Palpitations    Post partum depression    hx w/1st pregnancy    Rash    Rhinitis, allergic    RLS (restless legs syndrome)    Shortness of breath    Sinusitis    Sjogren's disease (HCC)    Skin blushing/flushing     Rosacea    Sleep apnea    cpap    Sleep disturbance    Somnambulance    daytime    Stool incontinence    Stress    Urinary incontinence    Wears glasses    Weight gain    Wheezing              Past Surgical History:   Procedure Laterality Date    Back surgery      Bone marrow aspirate &biopsy  08/05/2021    Colonoscopy      D & c      Egd      Oral surgery procedure      wisdom tooth resection    Other  07/30/2020    ablation uterine    Other  06/20/2022    LEFT LUMBAR 4 - LUMBAR 5 FRAGMENTECTOMY AND MICRODISCECTOMY AND ALL INDICATED PROCEDURES    Spine surgery procedure unlisted  June 20, 2022                Social History     Socioeconomic History    Marital status:    Tobacco Use    Smoking status: Never    Smokeless tobacco: Never   Vaping Use    Vaping status: Never Used   Substance and Sexual Activity    Alcohol use: Yes     Alcohol/week: 5.0 standard drinks of alcohol     Types: 5 Shots of liquor per week     Comment: 2 glasses, 2-4 days a week    Drug use: Never    Sexual activity: Not Currently   Other Topics Concern    Caffeine Concern Yes     Comment: coffee, tea, soda    Exercise Yes     Comment: 4 times per week    Seat Belt Yes   Social History Narrative    The patient does not use an assistive device..      The patient does live in a home with stairs.     Social Drivers of Health     Food Insecurity: No Food Insecurity (2/25/2025)    NCSS - Food Insecurity     Worried About Running Out of Food in the Last Year: No     Ran Out of Food in the Last Year: No   Housing Stability: Not At Risk (2/25/2025)    NCSS - Housing/Utilities     Has Housing: Yes     Worried About Losing Housing: No     Unable to Get Utilities: No                                Physical Exam     ED Triage Vitals [04/19/25 2142]   BP (!) 163/98   Pulse 92   Resp 20   Temp 98.6 °F (37 °C)   Temp src Oral   SpO2 98 %   O2 Device None (Room air)       Current Vitals:   Vital Signs  BP: (!) 163/98  Pulse: 92  Resp: 20  Temp: 98.6 °F  (37 °C)  Temp src: Oral    Oxygen Therapy  SpO2: 98 %  O2 Device: None (Room air)        Physical Exam  Constitutional:       General: Is not in acute distress.     Appearance: Is not ill-appearing.   HENT:      Head: Normocephalic and atraumatic.      Mouth/Throat:      Mouth: Mucous membranes are moist.   Eyes:      Conjunctiva/sclera: Conjunctivae normal.      Pupils: Pupils are equal, round, and reactive to light.   Cardiovascular:      Rate and Rhythm: Normal rate and regular rhythm.   Pulmonary:      Effort: Pulmonary effort is normal. No respiratory distress.      Breath sounds: Normal breath sounds.   Musculoskeletal:      Right lower leg: No edema.      Left lower leg: No edema.   Skin:     General: Skin is warm and dry.   Neurological:      General: No focal deficit present.      Mental Status: Is alert.     Physical Exam                ED Course   Labs Reviewed - No data to display            Results                                 MDM      Considered all emergent etiologies, differential includes but is not limited to: Allergic reaction, asthma exacerbation, PE, pneumonia     I have independently visualized the radiology images, my focus/limited interpretation: N/A     Defer to radiologist for other/incidental findings    Well-appearing patient with normal vital signs and unremarkable examination.  History of 1 month plus of these intermittent symptoms seem most consistent with allergies, possibly asthma or allergy induced asthma.  Will trial daily antihistamine and prescribe albuterol inhaler.  Discussed differential, red flags and return precautions as well as follow-up.        Medical Decision Making      Disposition and Plan     Clinical Impression:  1. Wheezing         Disposition:  Discharge  4/19/2025 10:14 pm    Follow-up:  Dana Adams DO  [address] [place] [id number]  [phone]    Follow up            Medications Prescribed:  Current Discharge Medication List        START  taking these medications    Details   cetirizine 10 MG Oral Tab Take 1 tablet (10 mg total) by mouth daily.  Qty: 30 tablet, Refills: 0      albuterol 108 (90 Base) MCG/ACT Inhalation Aero Soln Inhale 2 puffs into the lungs every 4 (four) hours as needed for Wheezing.  Qty: 1 each, Refills: 0             Supplementary Documentation:

## 2025-04-22 NOTE — PROGRESS NOTES
Dear Dana Adams DO, [place],    This letter is to inform you of Alberta Ledezma's Discharge in Physical Therapy at [facility].    Dx: Fall, initial encounter (W19.XXXA)  Acute bilateral low back pain with left-sided sciatica (M54.42)         Treatment # __15__ of __15__      Date of service: 4/16/25    ASSESSMENT  Pt. Reports 85% improvement. Pt. Is independent with HEP and will continue on her own at this time.     OTHER  Palpation: no tenderness with palpation  Sensation: intact     Lumbar  AROM: (* denotes performed with pain)  Flexion: 0cm  Extension: 10 cm  Sidebending: R 40cm. L 40cm  Rotation: R 60cm, L 60cm    Accessory motion: hypomobility to L3-L5    Flexibility:  Hamstrings: R 20; L 25    Strength/MMT: (* denotes performed with pain)  Core: upper abs 4+/5, lower abs 4+/5, back ext 4+/5  Hip Knee Foot/Ankle   Flexion: R 5/5; L 5/5  Extension: R 4+/5; L 4+/5  Abduction: R 4+/5; L 4+/5  ER: R 4+/5; L 4+/5  IR: R 4+/5; L 4+/5 Flexion: R 5/5; L 5/5  Extension: R 5/5; L 5/5    DF: R 5/5; L 5/5  PF: R 5/5; L 5/5  INV: R 5/5; L 5/5  EV: R 5/5; L 5/5  Great toe ext: R 5/5; L 5/5     Special tests:   (-)Dural tension right le   (-)SLR R   (-) R piriformis    Gait: pt ambulates on level ground with normal mechanics  Balance: SLS: R 30 sec, L 30 sec      LONG AND SHORT TERM GOALS  Goals: (to be met in 10 visits)  Patient will demonstrate lumbar flexion within available range with no pain within 6 weeks in order to pick objects from the floor. Met  Patient will tolerate sitting  for >30'  with no increase in pain within 6 weeks to improve community involvement. Met  Patient will demonstrate proper squat form with no increase in pain within 6 weeks in order to lift objects from low surfaces. Met  Patient will demonstrate improvement in DAGOBERTO score  within 6 weeks in order to improve functional mobility and return to PLOF. Met  Patient will have subjective  pain 2/10. Met  Pt will have a - Gowers sign with lumbar mobility. Met    RECOMMENDATIONS AND PLAN OF TREATMENT  Discharge patient. All goals met. Pt. To continue independently with HEP at this time.     REHAB POTENTIAL:  Good, all goals met.    PATIENT/FAMILY/CAREGIVER WAS ADVISED OF THESE FINDINGS, PRECAUTIONS, AND TREATMENT OPTIONS AND HAS AGREED TO ACTIVELY PARTICIPATE IN PLANNING AND FOR THIS COURSE OF CARE.  THANK YOU FOR YOUR REFERRAL. IF YOU HAVE ANY QUESTIONS PLEASE CONTACT ME AT:  [facility]: [phone]    PHYSICIAN CERTIFICATION REQUIRED: no    SINCERELY,     RICHARD CROWLEY, PT    I CERTIFY THE NEED FOR THESE SERVICES FURNISHED UNDER THIS PLAN OF TREATMENT AND WHILE UNDER MY CARE.     X__________________________________________________________________        DATE:     CERTIFICATION FROM: _________4/16/25 TO ________5/15/25__________    Thank you for the referral of Alberta Ledezma.      FAX NUMBER: [phone]  [id number]

## 2025-04-23 ENCOUNTER — PATIENT OUTREACH (OUTPATIENT)
Dept: CASE MANAGEMENT | Age: 45
End: 2025-04-23

## 2025-04-23 ENCOUNTER — TELEPHONE (OUTPATIENT)
Dept: FAMILY MEDICINE CLINIC | Facility: CLINIC | Age: 45
End: 2025-04-23

## 2025-04-23 NOTE — TELEPHONE ENCOUNTER
[facility] calling requesting a hospital follow up in 1 week from 4/19 with LE for this patient. Please advise if we can fit her in this week anywhere. They stated that the patient would prefer early morning appointment.

## 2025-04-23 NOTE — PROGRESS NOTES
Patient had recent Emergency Room visit, calling to offer Primary Care Physician follow-up appointment (discharged 04/19)    Dr Dana Adams  Family Medicine, IP consult to Primary Care  [address] [place] [id number]  [phone]  Dr Adams's office will call patient, due to no availability in needed time frame  Left Voicemail letting patient know she will be getting a call to schedule appointment; if still need assistance to call [phone]   Closing encounter

## 2025-04-23 NOTE — TELEPHONE ENCOUNTER
Patient had ER visit 4/19/25 and needs FU appointment.  Okay to work into schedule this week? Please advise  Patient prefers morning

## 2025-04-28 ENCOUNTER — LAB ENCOUNTER (OUTPATIENT)
Dept: LAB | Age: 45
End: 2025-04-28
Attending: FAMILY MEDICINE
Payer: COMMERCIAL

## 2025-04-28 ENCOUNTER — OFFICE VISIT (OUTPATIENT)
Dept: FAMILY MEDICINE CLINIC | Facility: CLINIC | Age: 45
End: 2025-04-28
Payer: COMMERCIAL

## 2025-04-28 ENCOUNTER — HOSPITAL ENCOUNTER (OUTPATIENT)
Dept: GENERAL RADIOLOGY | Age: 45
Discharge: HOME | End: 2025-04-28
Attending: FAMILY MEDICINE
Payer: COMMERCIAL

## 2025-04-28 VITALS
WEIGHT: 240 LBS | BODY MASS INDEX: 39.99 KG/M2 | HEIGHT: 65 IN | OXYGEN SATURATION: 98 % | HEART RATE: 81 BPM | RESPIRATION RATE: 18 BRPM | DIASTOLIC BLOOD PRESSURE: 92 MMHG | SYSTOLIC BLOOD PRESSURE: 136 MMHG

## 2025-04-28 DIAGNOSIS — R06.2 WHEEZING: ICD-10-CM

## 2025-04-28 DIAGNOSIS — R09.89 ABNORMAL LUNG SOUNDS: ICD-10-CM

## 2025-04-28 DIAGNOSIS — R06.2 WHEEZING: Primary | ICD-10-CM

## 2025-04-28 PROCEDURE — 36415 COLL VENOUS BLD VENIPUNCTURE: CPT

## 2025-04-28 PROCEDURE — 86003 ALLG SPEC IGE CRUDE XTRC EA: CPT

## 2025-04-28 PROCEDURE — 71046 X-RAY EXAM CHEST 2 VIEWS: CPT | Performed by: FAMILY MEDICINE

## 2025-04-28 PROCEDURE — 3008F BODY MASS INDEX DOCD: CPT | Performed by: FAMILY MEDICINE

## 2025-04-28 PROCEDURE — 82785 ASSAY OF IGE: CPT

## 2025-04-28 PROCEDURE — 99214 OFFICE O/P EST MOD 30 MIN: CPT | Performed by: FAMILY MEDICINE

## 2025-04-28 PROCEDURE — 3075F SYST BP GE 130 - 139MM HG: CPT | Performed by: FAMILY MEDICINE

## 2025-04-28 PROCEDURE — 3080F DIAST BP >= 90 MM HG: CPT | Performed by: FAMILY MEDICINE

## 2025-04-28 RX ORDER — PREDNISONE 20 MG/1
40 TABLET ORAL DAILY
Qty: 8 TABLET | Refills: 0 | Status: SHIPPED | OUTPATIENT
Start: 2025-04-28 | End: 2025-05-02

## 2025-04-28 NOTE — PATIENT INSTRUCTIONS
Tick Bite (Antibiotic Treatment)    Ticks are small arachnids that feed on the blood of rodents, rabbits, birds, deer, dogs and humans.  The bite may cause a local reaction like that of a spider, with a small amount of local redness, itching and slight sw Winneshiek When to seek medical advice  Call your healthcare provider right away if any of these occur:  Signs of local infection. Watch for these during the next few days.   · Increasing redness around the bite site  · Increased pain or swelling  · Fever over 100.4°F · To prevent disease, you may be given antibiotics. Both Lyme disease and [facility] spotted fever respond quickly to these medications. · You may be asked to see your health care provider for a blood test to check for Lyme disease.   Follow-up care  I

## 2025-04-28 NOTE — PROGRESS NOTES
The following individual(s) verbally consented to be recorded using ambient AI listening technology and understand that they can each withdraw their consent to this listening technology at any point by asking the clinician to turn off or pause the recording:    Patient name: Alberta Ledezma  Additional names:      Alberta Ledezma is a 44 year old female who presents for    History of Present Illness  Alberta Ledezma is a 44 year old female who presents with wheezing and persistent coughing.    She has been experiencing wheezing in the mornings for the past couple of weeks, which is accompanied by coughing fits, particularly at night. The symptoms became severe enough last Saturday night that she considered going to the ER. She used her 's albuterol inhaler, which provided some relief, but she was concerned about overdosing. She also took Benadryl, suspecting possible allergies, but it did not alleviate her symptoms.    In the ER labs and an EKG, both of which were normal. She was prescribed albuterol inhalers and generic Zyrtec. She continues to use the inhaler at night and in the morning, noting that it helps with the wheezing, which sometimes wakes her up at night.    No recent illness, seasonal allergies, or exposure to new environmental factors such as pets, detergents, or outdoor activities. No history of smoking or secondhand smoke exposure. No known food allergies, only medication allergies. She experienced an itchy throat but no lip or tongue swelling. She has not been exercising intensely, only walking.    Her family history includes a son with asthma, but no other family members with asthma. She had COVID in March, which was her first time, and she did not experience wheezing during that illness.         Current Outpatient Medications   Medication Sig Dispense Refill    cetirizine 10 MG Oral Tab Take 1 tablet (10 mg total) by mouth daily. 30 tablet 0    albuterol 108 (90 Base)  MCG/ACT Inhalation Aero Soln Inhale 2 puffs into the lungs every 4 (four) hours as needed for Wheezing. 1 each 0    Phentermine HCl 37.5 MG Oral Tab Take 1 tablet (37.5 mg total) by mouth every morning before breakfast. 30 tablet 2    ALPRAZolam 0.5 MG Oral Tablet Dispersible Take 1 tablet (0.5 mg total) by mouth daily as needed for Anxiety (panic attacks). 20 tablet 0    Doxycycline Hyclate 20 MG Oral Tab Take 1 tablet (20 mg total) by mouth as needed.      Ciclopirox 1 % External Shampoo       Fluocinonide 0.05 % External Solution       OXYBUTYNIN ER 5 MG Oral Tablet 24 Hr TAKE 1 TABLET(5 MG) BY MOUTH DAILY (Patient not taking: Reported on 4/28/2025) 90 tablet 0      Past Medical History:    Abdominal distention    Abdominal pain    Anemia    Anxiety    Arthritis    Back pain    Back problem    Bad breath    Belching    Bleeding nose    Bloating    Blood in the stool    Cervicalgia    Change in hair    Chest pain    Constipation    Depression    Depressive disorder, not elsewhere classified    Diarrhea, unspecified    Dizziness    Dysmenorrhea    Dysphonia    Easy bruising    Fatigue    Flatulence/gas pain/belching    Frequent urination    Headache disorder    Heart palpitations    Heartburn    Hemorrhoids    High cholesterol    History of depression    History of eating disorder    Irregular bowel habits    Stool not forming    Itch of skin    After hospitalized for neutropenia    Leaking of urine    Major depressive disorder, recurrent episode, mild    Mouth sores    Nausea    Night sweats    Pain in joint, pelvic region and thigh    Pain in joints    Pain with bowel movements    Palpitations    Post partum depression    hx w/1st pregnancy    Rash    Rhinitis, allergic    RLS (restless legs syndrome)    Shortness of breath    Sinusitis    Sjogren's disease (HCC)    Skin blushing/flushing    Rosacea    Sleep apnea    cpap    Sleep disturbance    Somnambulance    daytime    Stool incontinence    Stress    Urinary  incontinence    Wears glasses    Weight gain    Wheezing      Past Surgical History:   Procedure Laterality Date    Back surgery      Bone marrow aspirate &biopsy  08/05/2021    Colonoscopy      D & c      Egd      Oral surgery procedure      wisdom tooth resection    Other  07/30/2020    ablation uterine    Other  06/20/2022    LEFT LUMBAR 4 - LUMBAR 5 FRAGMENTECTOMY AND MICRODISCECTOMY AND ALL INDICATED PROCEDURES    Spine surgery procedure unlisted  June 20, 2022      Family History   Problem Relation Age of Onset    Heart Attack Father     Hypertension Mother     Hypertension Maternal Grandmother     Cancer Paternal Grandfather     Breast Cancer Maternal Aunt       Social History:  Social History     Socioeconomic History    Marital status:    Tobacco Use    Smoking status: Never    Smokeless tobacco: Never   Vaping Use    Vaping status: Never Used   Substance and Sexual Activity    Alcohol use: Yes     Alcohol/week: 5.0 standard drinks of alcohol     Types: 5 Shots of liquor per week     Comment: 2 glasses, 2-4 days a week    Drug use: Never    Sexual activity: Not Currently   Other Topics Concern    Caffeine Concern Yes     Comment: coffee, tea, soda    Exercise Yes     Comment: 4 times per week    Seat Belt Yes   Social History Narrative    The patient does not use an assistive device..      The patient does live in a home with stairs.     Social Drivers of Health     Food Insecurity: No Food Insecurity (2/25/2025)    NCSS - Food Insecurity     Worried About Running Out of Food in the Last Year: No     Ran Out of Food in the Last Year: No   Housing Stability: Not At Risk (2/25/2025)    NCSS - Housing/Utilities     Has Housing: Yes     Worried About Losing Housing: No     Unable to Get Utilities: No      Occ: . :   Working 2 jobs .   Exercise: minimal.  Diet: watches minimally     REVIEW OF SYSTEMS:   GENERAL: feels well otherwise  SKIN: denies any unusual skin lesions  EYES:denies blurred vision  or double vision  HEENT: denies nasal congestion, sinus pain or ST  LUNGS: denies shortness of breath with exertion  CARDIOVASCULAR: denies chest pain on exertion  GI: denies abdominal pain,denies heartburn  : denies nocturia or changes in stream  MUSCULOSKELETAL: back pain  NEURO: denies headaches  PSYCHE: denies depression or anxiety  HEMATOLOGIC: denies hx of anemia  ENDOCRINE: denies thyroid history  ALL/ASTHMA: denies asthma    EXAM:   /90   Pulse 81   Resp 18   Ht 5' 5\" (1.651 m)   Wt 240 lb (108.9 kg)   LMP 03/26/2025 (Approximate)   SpO2 98%   BMI 39.94 kg/m²   Body mass index is 39.94 kg/m².   GENERAL: well developed, well nourished,in no apparent distress  SKIN: no rashes,no suspicious lesions  HEENT: atraumatic, normocephalic,ears and throat are clear  EYES:PERRLA, EOMI, normal optic disk,conjunctiva are clear  NECK: supple,no adenopathy,no bruits, no JVD  CHEST: no chest tenderness  BREAST: no dominant or suspicious mass  LUNGS: course BS   CARDIO: RRR without murmur  EXTREMITIES: no cyanosis, clubbing or edema  NEURO: Oriented times three,cranial nerves are intact,motor and sensory are grossly intact    ASSESSMENT AND PLAN:   Alberta Ledezma is a 44 year old female who presents for   Assessment & Plan  Wheezing and coughing  Intermittent wheezing and coughing for the past few weeks, primarily in the morning and at night. No asthma, smoking, or secondhand smoke exposure. No recent illness, fever, or new environmental exposures. Albuterol inhaler provides relief. Differential diagnosis includes asthma, allergic reaction, or atypical pneumonia. Pulmonary function tests and chest x-ray ordered to evaluate further. Blood tests for food and environmental allergies ordered. Spacer prescribed to improve inhaler efficacy. Prednisone considered if symptoms persist or worsen. Instructed on proper inhaler technique with spacer for effective medication delivery.  - Order pulmonary function tests  and chest x-ray  - Prescribe spacer for inhaler use  - Order blood tests for food and environmental allergies  - Provide prednisone to hold for potential use    Possible allergic reaction  Possible allergic reaction considered due to wheezing and coughing, though no new foods, medications, or environmental changes reported. Throat itching noted, but no lip or tongue swelling. Blood tests for food and environmental allergies ordered to investigate potential allergens.  - Order blood tests for food and environmental allergies    1. Wheezing    - Spacer/Aero-Holding Chambers Does not apply Device; Use with albuterol  Dispense: 1 each; Refill: 0  - Complete PFT; Future  - Adult Food Allergy Prof [E]; Future  - Allergens, Zone 8 [E]; Future  - predniSONE 20 MG Oral Tab; Take 2 tablets (40 mg total) by mouth daily for 4 days.  Dispense: 8 tablet; Refill: 0  - XR CHEST PA + LAT CHEST (CPT=71046); Future    2. Abnormal lung sounds    - Spacer/Aero-Holding Chambers Does not apply Device; Use with albuterol  Dispense: 1 each; Refill: 0  - Complete PFT; Future  - predniSONE 20 MG Oral Tab; Take 2 tablets (40 mg total) by mouth daily for 4 days.  Dispense: 8 tablet; Refill: 0  - XR CHEST PA + LAT CHEST (CPT=71046); Future          Pt is low-moderate risk for a moderate risk procedure.   RTC June  or sooner if needed.

## 2025-04-30 ENCOUNTER — PATIENT MESSAGE (OUTPATIENT)
Dept: INTERNAL MEDICINE CLINIC | Facility: CLINIC | Age: 45
End: 2025-04-30

## 2025-04-30 LAB

## 2025-04-30 NOTE — TELEPHONE ENCOUNTER
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  Just to refresh, here are the appointments I have fulfilled or are coming up:     Bariatric Surgery Class: January 13th, 2025  General Practitioner: April 9, 2025  Cardiology: April 25, 2025  Pulmonology: May 6th, 2025  Nutritionist: May 5, 2025  Psych Eval: May 12, 2025     Nutrition should be last b4 refrl

## 2025-05-01 LAB
ALLERGEN BRAZIL NUT: <0.1 KUA/L (ref ?–0.1)
ALMOND IGE QN: <0.1 KUA/L (ref ?–0.1)
CASHEW NUT IGE QN: <0.1 KUA/L (ref ?–0.1)
CLAM IGE QN: <0.1 KUA/L (ref ?–0.1)
CODFISH IGE QN: <0.1 KUA/L (ref ?–0.1)
CORN IGE QN: <0.1 KUA/L (ref ?–0.1)
COW MILK IGE QN: <0.1 KUA/L (ref ?–0.1)
EGG WHITE IGE QN: <0.1 KUA/L (ref ?–0.1)
GLUTEN IGE QN: <0.1 KUA/L (ref ?–0.1)
HAZELNUT IGE QN: <0.1 KUA/L (ref ?–0.1)
IGE SERPL-ACNC: 33.4 KU/L (ref 2–214)
PEANUT IGE QN: <0.1 KUA/L (ref ?–0.1)
SALMON IGE QN: <0.1 KUA/L (ref ?–0.1)
SCALLOP IGE QN: <0.1 KUA/L (ref ?–0.1)
SESAME SEED IGE QN: <0.1 KUA/L (ref ?–0.1)
SHRIMP IGE QN: <0.1 KUA/L (ref ?–0.1)
SOYBEAN IGE QN: <0.1 KUA/L (ref ?–0.1)
WALNUT IGE QN: <0.1 KUA/L (ref ?–0.1)
WHEAT IGE QN: <0.1 KUA/L (ref ?–0.1)

## 2025-05-06 ENCOUNTER — OFFICE VISIT (OUTPATIENT)
Dept: PULMONOLOGY | Facility: CLINIC | Age: 45
End: 2025-05-06

## 2025-05-06 VITALS
SYSTOLIC BLOOD PRESSURE: 142 MMHG | HEART RATE: 96 BPM | WEIGHT: 241.63 LBS | BODY MASS INDEX: 40.26 KG/M2 | DIASTOLIC BLOOD PRESSURE: 82 MMHG | HEIGHT: 65 IN | RESPIRATION RATE: 12 BRPM | OXYGEN SATURATION: 97 %

## 2025-05-06 DIAGNOSIS — G47.00 INSOMNIA, UNSPECIFIED TYPE: ICD-10-CM

## 2025-05-06 DIAGNOSIS — G47.10 HYPERSOMNIA: ICD-10-CM

## 2025-05-06 DIAGNOSIS — G47.33 OSA (OBSTRUCTIVE SLEEP APNEA): Primary | ICD-10-CM

## 2025-05-06 DIAGNOSIS — G25.81 RESTLESS LEG SYNDROME: ICD-10-CM

## 2025-05-06 DIAGNOSIS — Z86.16 HISTORY OF COVID-19: ICD-10-CM

## 2025-05-06 PROCEDURE — 3077F SYST BP >= 140 MM HG: CPT | Performed by: PHYSICIAN ASSISTANT

## 2025-05-06 PROCEDURE — 99214 OFFICE O/P EST MOD 30 MIN: CPT | Performed by: PHYSICIAN ASSISTANT

## 2025-05-06 PROCEDURE — 3079F DIAST BP 80-89 MM HG: CPT | Performed by: PHYSICIAN ASSISTANT

## 2025-05-06 PROCEDURE — 3008F BODY MASS INDEX DOCD: CPT | Performed by: PHYSICIAN ASSISTANT
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
## 2025-05-06 NOTE — PATIENT INSTRUCTIONS
Resume CPAP nightly. Please let us know if you have any troubles when you get started. We will order new CPAP supplies.  Sleep hygiene information as provided before. Recommend cognitive behavioral therapy for insomnia.  Let me know once you complete pulmonary function testing.    Cognitive Behavioral Therapy for Insomnia    [facility] - [phone]  [facility] - [phone]  [email or web address]  [facility] - [phone]  [email or web address]  [facility] CBT-I program - [email or web address]   [facility] (telehealth and in-person)  You can Google 'cognitive behavioral therapy for insomnia near me' to locate other private practices.

## 2025-05-07 ENCOUNTER — OFFICE VISIT (OUTPATIENT)
Dept: SURGERY | Facility: CLINIC | Age: 45
End: 2025-05-07
Payer: COMMERCIAL

## 2025-05-07 ENCOUNTER — RT VISIT (OUTPATIENT)
Dept: RESPIRATORY THERAPY | Facility: HOSPITAL | Age: 45
End: 2025-05-07
Attending: FAMILY MEDICINE
Payer: COMMERCIAL

## 2025-05-07 DIAGNOSIS — E53.8 VITAMIN B12 DEFICIENCY: ICD-10-CM

## 2025-05-07 DIAGNOSIS — E55.9 VITAMIN D DEFICIENCY: ICD-10-CM

## 2025-05-07 DIAGNOSIS — R09.89 ABNORMAL LUNG SOUNDS: ICD-10-CM

## 2025-05-07 DIAGNOSIS — E66.813 CLASS 3 SEVERE OBESITY WITH BODY MASS INDEX (BMI) OF 40.0 TO 44.9 IN ADULT, UNSPECIFIED OBESITY TYPE, UNSPECIFIED WHETHER SERIOUS COMORBIDITY PRESENT: Primary | ICD-10-CM

## 2025-05-07 DIAGNOSIS — R06.2 WHEEZING: ICD-10-CM

## 2025-05-07 PROCEDURE — 97802 MEDICAL NUTRITION INDIV IN: CPT

## 2025-05-07 NOTE — PROGRESS NOTES
Order for CPAP supplies sent to [facility] to establish care via parachute.

## 2025-05-07 NOTE — PROGRESS NOTES
INITIAL OUTPATIENT NUTRITION CONSULTATION        Nutrition Assessment    Medical Diagnosis: Obesity and Parkinson's Disease    Physical Findings: fatigue, joint pain, and gets 5-6 hours of sleep per night    Client Age and Gender: 44 year old female    Marital Status and Occupation: Lives with  has 3 kids. 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      Labs:   No components found for: \"HGBA1C\"  Triglycerides   Date Value Ref Range Status   05/16/2024 260 (H) 30 - 149 mg/dL Final     Comment:     Reference interval for fasting triglycerides  Desirable: <150 mg/dL  Borderline: 150-199 mg/dL  High: 200-499 mg/dL  Very High: >=500 mg/dL           LDL Cholesterol   Date Value Ref Range Status   05/16/2024 108 (H) <100 mg/dL Final     Comment:     Desirable <100 mg/dL   Borderline 100-129 mg/dL   High     >=130mg/dL         HDL Cholesterol   Date Value Ref Range Status   05/16/2024 36 (L) 40 - 59 mg/dL Final     Comment:     Interpretive Information:   An HDL cholesterol <40 mg/dL is low and constitutes a coronary heart disease risk factor. An HDL cholesterol >60 mg/dL is a negative risk factor for coronary heart disease.         AST   Date Value Ref Range Status   11/13/2024 16 15 - 37 U/L Final   06/23/2014 17 15 - 41 U/L Final   10/21/2013 12 10 - 30 U/L Final     ALT   Date Value Ref Range Status   11/13/2024 32 13 - 56 U/L Final   06/23/2014 11 (L) 14 - 54 U/L Final   10/21/2013 8 6 - 29 U/L Final         Height:  Ht Readings from Last 1 Encounters:   05/06/25 5' 5\" (1.651 m)       Weight:   Wt Readings from Last 2 Encounters:   05/06/25 241 lb 9.6 oz   04/28/25 240 lb       BMI Readings from Last 1 Encounters:   05/06/25 40.20 kg/m²       Weight change: Decrease of 0.6 lbs in the past month    Goal weight/Health status:175 lbs    Diet/Weight History: Per pt excess weight began as a child.  Per pt has tried WW, Overeaters Anonomymus, Atkins, Keto for 2 years.    Current Diet: calorie reduction    Food/Beverage  Intake:     Breakfast: eggs or spinach feta wrap [employer] or egg sandwich on Keto bread with stewart and cheese  Snack: skips  Lunch: skips 4/7 days or pb sandwich on keto bread    Snack:skips  Dinner: chicken teriyaki and rice or pizza or protein and vegetable and starch  Snack: skips  Beverages: water 51-67 oz/day and Diet Coke 1 can per day and regular coffee with cream 1.5 cups per day    Vitamins/mineral supplements:women's one a day    Meal pattern: 2-3 meals/d, 0 snacks/d    Number of meals/week eaten at restaurants: 6x/week        Alcohol Intake: 3-4 times per week 2 drinks    Estimated current caloric intake: 2400 cals/d    Estimated caloric needs for weight loss: 2400-189=5056 cals/d for 1 pounds/week weight loss    Physical Activity: walks on occasion and dances     Food Journal: on text in the past    Spent 40 minutes in consultation with the patient.      Nutrition Intervention/Education:  Comprehensive nutrition education and evaluation provided for weight loss. Met with pt for initial visit. Pt is being followed by Tamika Mejia for medical weight management.  Pt is taking phentermine for the past 2 months and feels gives her energy and curbs energy.   Per pt excess weight began as a child.  Per pt has tried WW, Overeaters Anonomymus, Atkins, Keto for 2 years.  Per diet recall, suspect current diet is low in protein and higher in carbs.   Discussed benefits of including protein and produce with meals and snacks.  Provided guidance, ideas and recipe for including protein with produce in diet.  Pt verbalized understanding.  Per pt is drinking ~ 51-67 oz of water per day. Discussed ideas for increasing hydration in diet.  Pt verbalized understanding.  Per pt is not exercising regularly.  Discussed ideas to increase exercise in small increments.  Pt verbalized understanding.  Patient agreed to goals below.    Goals:     1.  Keep a food record, My Net Diary, select the macros dashboard.  2.  Strive to  consume at least 4-6 meals/snacks per day.  Include protein and produce when you eat.  Aim for  grams of protein per day.  Try to keep the carbohydrates at 100-120 grams per day or less.    3.  Practice eating strategies, eat separately from drinking-wait 30 minutes after eating to drink, avoid straws and chewing gum, use smaller plates/bowls/utensils, chew food 20-30 times before swallowing.  Make the meals last 30 minutes.  4.  Aim for 64 oz per day of water.  (Try adding lemon, mint or fruit to water, Protein 2 O water, add True Lemon, Crystal Light, use a measured container).  5.  Taper caffeine and carbonation.  6.  Exercise with a goal of 30 minutes per day for exercise (for example,walking).  (I burn 8 calories per minute of walking).  7.  Strength training at least 10 minutes 3 days per week.  (7 minute workout song on YouTube) or (Gym Rat no more-18 at home exercises)     Monitoring/Evaluation:  Follow up appt scheduled with dietitian          Rossana Garcia RD, LDN

## 2025-05-07 NOTE — PATIENT INSTRUCTIONS
Goals:     1.  Keep a food record, My Net Diary, select the macros dashboard.  2.  Strive to consume at least 4-6 meals/snacks per day.  Include protein and produce when you eat.  Aim for  grams of protein per day.  Try to keep the carbohydrates at 100-120 grams per day or less.    3.  Practice eating strategies, eat separately from drinking-wait 30 minutes after eating to drink, avoid straws and chewing gum, use smaller plates/bowls/utensils, chew food 20-30 times before swallowing.  Make the meals last 30 minutes.  4.  Aim for 64 oz per day of water.  (Try adding lemon, mint or fruit to water, Protein 2 O water, add True Lemon, Crystal Light, use a measured container).  5.  Taper caffeine and carbonation.  6.  Exercise with a goal of 30 minutes per day for exercise (for example,walking).  (I burn 8 calories per minute of walking).  7.  Strength training at least 10 minutes 3 days per week.  (7 minute workout song on YouTube) or (Gym Rat no more-18 at home exercises)

## 2025-05-13 ENCOUNTER — PATIENT MESSAGE (OUTPATIENT)
Dept: PULMONOLOGY | Facility: CLINIC | Age: 45
End: 2025-05-13

## 2025-05-13 PROCEDURE — 94726 PLETHYSMOGRAPHY LUNG VOLUMES: CPT | Performed by: INTERNAL MEDICINE

## 2025-05-13 PROCEDURE — 94060 EVALUATION OF WHEEZING: CPT | Performed by: INTERNAL MEDICINE

## 2025-05-13 PROCEDURE — 94729 DIFFUSING CAPACITY: CPT | Performed by: INTERNAL MEDICINE

## 2025-05-13 NOTE — PROCEDURES
Patient is a 44-year-old female being assessed with a diagnosis of wheezing.    Results    FEV1 3.11 L normal at 0.57  FVC 3.78 L normal at 0.55  Ratio was normal at 82%  Flow-volume loop with out demonstrable abnormality    Total lung capacity 5.18 L normal at -0.38  Residual volume 1.39 L normal at -0.08  Diffusion capacity is normal 0.60.  When corrected for alveolar volume it increases 1.03.    Impression--there is no evidence of airways obstruction.  Lung volumes and diffusion are normal.  This is a normal study with clinical correlation suggested to assess for role of bronchoprovocation.    No prior studies found

## 2025-05-15 RX ORDER — FLUTICASONE FUROATE 100 UG/1
1 POWDER RESPIRATORY (INHALATION) DAILY
Qty: 1 EACH | Refills: 2 | Status: SHIPPED | OUTPATIENT
Start: 2025-05-15

## 2025-05-27 PROBLEM — G20.A1 PARKINSON'S DISEASE (HCC): Status: RESOLVED | Noted: 2023-06-14 | Resolved: 2025-05-27

## 2025-06-03 ENCOUNTER — PATIENT MESSAGE (OUTPATIENT)
Dept: INTERNAL MEDICINE CLINIC | Facility: CLINIC | Age: 45
End: 2025-06-03

## 2025-06-03 DIAGNOSIS — Z01.818 PREOPERATIVE CLEARANCE: ICD-10-CM

## 2025-06-03 DIAGNOSIS — F50.819 BINGE EATING DISORDER, UNSPECIFIED SEVERITY: Primary | ICD-10-CM

## 2025-06-03 DIAGNOSIS — G47.33 OBSTRUCTIVE SLEEP APNEA SYNDROME: ICD-10-CM

## 2025-06-03 DIAGNOSIS — Z51.81 THERAPEUTIC DRUG MONITORING: ICD-10-CM

## 2025-06-12 ENCOUNTER — OFFICE VISIT (OUTPATIENT)
Dept: INTERNAL MEDICINE CLINIC | Facility: CLINIC | Age: 45
End: 2025-06-12
Payer: COMMERCIAL

## 2025-06-12 VITALS — HEIGHT: 65 IN | WEIGHT: 243.63 LBS | BODY MASS INDEX: 40.59 KG/M2

## 2025-06-12 PROCEDURE — 97802 MEDICAL NUTRITION INDIV IN: CPT

## 2025-06-12 PROCEDURE — 3008F BODY MASS INDEX DOCD: CPT

## 2025-06-12 NOTE — PATIENT INSTRUCTIONS
Goals:   1.  Continue to keep a food record, My Net Diary, select the macros dashboard.  2.  Strive to consume at least 4-6 meals/snacks per day.  Include protein and produce when you eat.  Aim for  grams of protein per day.  Try to keep the carbohydrates at 100-120 grams per day or less.    3.  Practice eating strategies, eat separately from drinking-wait 30 minutes after eating to drink, avoid straws and chewing gum, use smaller plates/bowls/utensils, chew food 20-30 times before swallowing.  Make the meals last 30 minutes.  4.  Aim for 64 oz per day of water.  (Try adding lemon, mint or fruit to water, Protein 2 O water, add True Lemon, Crystal Light, use a measured container).  5.  Taper caffeine and carbonation.  6.  Exercise with a goal of 30 minutes per day for exercise (for example,walking).  (I burn 8 calories per minute of walking).  7.  Strength training at least 10 minutes 3 days per week.  (7 minute workout song on YouTube) or (Gym Rat no more-18 at home exercises)   8.  Complete the quizzes in the binder on pages 18-24 for review at next visit.

## 2025-06-12 NOTE — PROGRESS NOTES
[facility]  [address]  [facility] [place] [address]  Dept: [phone]  Loc: [phone]    06/12/25    Bariatric Initial Nutrition Assessment    Alberta Ledezma is a 44 year old female.    Referring Physician: Dr. Maki  Reason for MNT Referral: Initial assessment for: Gastric Bypass and Vertical Sleeve Gastrectomy      Assessment   Medical Diagnosis:  obesity grade III, Parkinson's disease, vit B12 deficiency    Problem List[1]    Past Medical History:   Past Medical History[2]    Weight history:  Struggled with weight  most of life, Pt's highest adult weight 250 lbs at 35 y/o, Pt's lowest adult weight 175 lbs at 33 y/o, and Reason given for weight gain:  Struggled with weight most of his/her life and Emotional, stress eating    Patient has tried: High protein, Low carbohydrate, Low fat, Calorie counting : on own, Weight Watchers, Over Eaters Anonymous, and Other: Keto and Atkins    Patient's most successful weight loss attempt was 2013. Lost 50-60 lbs  and kept it off 12 months.    Patient has tried these medications for weight loss: Ionamin/Adipex/Phentermine, Other: wegovy, and Topamax/Topriamate    Patient has received these behavioral treatments for weight loss: None    Patient is being followed by Tamika Mejia for medical weight loss.    Patient has completed medical weight management No    Patient has support from: Family, Primary care physician, and Friends    Patient acknowledges binge eating behavior: Eats a larger amount of food than normal in a short period of time., Feels a lack of control over the amount of food or rate of food eaten., Eats much more rapidly than normal., Eats until uncomfortably full., Eats large amounts of food when not physically hungry., Feels disgusted, depressed, or guilty after overeating., Hides the amounts of foods eaten from others., and Eats to escape. Loses track of time or is unaware of the  binge.    Patient engages in binge-purge behavior: no    Patient uses laxatives or vomits as a means of purging: no    Patient complains of: nausea diarrhea constipation flatuence stomach grumbling    Patient's motivation for bariatric surgery: \"I want to able to function daily.  I want to be done with hurting so I can exercise.\"    Allergies:  Allergies[3]    Height:    Ht Readings from Last 1 Encounters:   05/27/25 5' 5\" (1.651 m)       Weight:    Wt Readings from Last 6 Encounters:   05/27/25 239 lb 9.6 oz   05/06/25 241 lb 9.6 oz   04/28/25 240 lb   04/19/25 240 lb   04/09/25 241 lb   04/07/25 241 lb       IBW:  Patient weight not recorded  BMI: There is no height or weight on file to calculate BMI. Obesity Grade III, greater than or equal to 40    Diet Rx: calorie reduction    Labs:    Lab Results   Component Value Date    GLU 79 11/13/2024    BUN 8 (L) 11/13/2024    BUNCREA 19.4 05/05/2021    CREATSERUM 0.80 11/13/2024    ANIONGAP 7 11/13/2024    GFRNAA 85 06/06/2022    GFRAA 98 06/06/2022    CA 9.1 11/13/2024    OSMOCALC 275 11/13/2024    ALKPHO 73 11/13/2024    AST 16 11/13/2024    ALT 32 11/13/2024    BILT 0.8 11/13/2024    TP 7.7 11/13/2024    ALB 3.9 11/13/2024    GLOBULIN 3.8 11/13/2024    AGRATIO 1.9 10/21/2013     (L) 11/13/2024    K 3.3 (L) 11/13/2024     11/13/2024    CO2 23.0 11/13/2024     Lab Results   Component Value Date    MG 2.3 08/06/2021     No results found for: \"PHOS\"    Thyroid:    Lab Results   Component Value Date    TSH 1.860 05/16/2024    TSH 2.190 02/13/2023    TSH 2.220 08/05/2021    T4F 0.9 05/16/2024    T4F 0.8 02/13/2023    T4F 1.0 05/05/2021       Iron Panel: No results found for: \"IRON\", \"IRONTOT\", \"TIBC\", \"IRONSAT\", \"TRANSFERRIN\", \"TIBCP\", \"IRONBIND\", \"SAT\", \"SATUR\"    Diabetes:    Lab Results   Component Value Date     05/16/2024    A1C 5.2 05/16/2024       Lipid Panel:   Lab Results   Component Value Date    CHOLEST 189 05/16/2024    TRIG 260 (H)  05/16/2024    HDL 36 (L) 05/16/2024     (H) 05/16/2024    VLDL 45 (H) 05/16/2024    NONHDLC 153 (H) 05/16/2024       Vitamins/Minerals:  Lab Results   Component Value Date    B12 251 05/16/2024     Lab Results   Component Value Date    VITD 17.2 (L) 05/16/2024     No results found for: \"THIAMINE\"   No results found for: \"VITB1\"  Lab Results   Component Value Date/Time    FOLIC 11.3 05/16/2024 08:02 AM       Relevant Meds:    Medications - Current[4]    Vitamins/Minerals: Other: will start 2000 UI per day due to recent low lab, mvi-not consistent  Food Intolerances: Other: no  Food Allergies:  nkfa  Smoker:     History   Smoking Status    Never   Smokeless Tobacco    Never       Alcohol Intake:    History   Alcohol Use    2.0 standard drinks of alcohol/week    2 Shots of liquor per week     Comment: 2 glasses, 2-4 days a week       Drugs:    History   Drug Use Unknown       Estimated Kcal Needs ([place] [address] [facility]): 1660 kcal/day   Estimated Protein Needs:   grams/day  Estimated Fluid Needs: Aim for 64 ozs per day  Wakes at 7-8 am  Diet history:       Breakfast      AM Snack       Lunch     PM Snack     Dinner Evening Snack   7:30-8:30 am- egg casserole or egg sandwich on Keto bread with stewart and cheese  skips skips 2-3 was 4/7 days or salad with almonds eggs or pb sandwich on keto bread   avocado chicken teriyaki and rice or pizza or protein and vegetable and starch            skips    Meal pattern consists of 2-3 meals, 1 snacks and 0 protein supplements.    Total Kcal: unsure   Excessive in: other: carbs  Inadequate in:  protein   Trigger Foods: chocolate or salty snacks like chips  Patient currently consumes caffeine: more often than 3 times/week  Patient currently consumes soda: 1-3 times/week    Activity Level: moderate  Type: walk and dance  Duration: 150 minute  Frequency: per week    Other:  Met with pt for initial bariatric surgical evaluation.  Met with pt last month as non surgical pt to  discuss lifestyle goals. Pt is being followed by Tamika Mejia for medical weight management.  Per pt has been taking phentermine for the past 3 months and feels it gives her energy.  Pt is pursuing bariatric surgery with Dr. Campos.  Per pt excess weight began as a child.  Per pt has tried the following diets including but not limited to WW, Overeaters Anonomymus, Atkins, Keto for 2 years. . Reviewed program binder with pt.  Answered pt questions.  Pt with follow up visit scheduled for next month.     Nutrition Diagnosis: Morbid obesity: Improvement shown    Intervention     1.  Nutrition Rx:  Reviewed  3 meal plan, Discussed portion control, Reviewed Bariatric Diet Stages 1-4, Discussed goals, and Obtain MVI  2.  Coordination of care: attend support group prior to surgery; reminder for importance of multidisciplinary consultations.  3.  Materials given: [facility] Bariatric Manual and Goals Handout    Goals:   1.  Continue to keep a food record, My Net Diary, select the macros dashboard.  2.  Strive to consume at least 4-6 meals/snacks per day.  Include protein and produce when you eat.  Aim for  grams of protein per day.  Try to keep the carbohydrates at 100-120 grams per day or less.    3.  Practice eating strategies, eat separately from drinking-wait 30 minutes after eating to drink, avoid straws and chewing gum, use smaller plates/bowls/utensils, chew food 20-30 times before swallowing.  Make the meals last 30 minutes.  4.  Aim for 64 oz per day of water.  (Try adding lemon, mint or fruit to water, Protein 2 O water, add True Lemon, Crystal Light, use a measured container).  5.  Taper caffeine and carbonation.  6.  Exercise with a goal of 30 minutes per day for exercise (for example,walking).  (I burn 8 calories per minute of walking).  7.  Strength training at least 10 minutes 3 days per week.  (7 minute workout song on YouTube) or (Gym Rat no more-18 at home exercises)   8.  Complete the quizzes in the  binder on pages 18-24 for review at next visit.      Monitor/Evaluate     Food/fluid intake/choices  Nutrition Rx  Anthropometric measurements  Body composition-InBody Testing at next appointment per surgeon recommendation  Updated labs  F/U MD plan of care  F/U to reinforce goals  F/U on vitamin/mineral supplementation  Review quizzes   for liquid protein diet prior to surgery      Additional RD visits required to review concepts? yes  Patient understands protein requirements? yes  Patient understand fluid requirements (amount and method of intake)? yes  Patient understands post-operative diet? yes  Patient ready for Liquid Protein Education? no    Rossana Garcia, JOSESITO, LDN    Face-to-face time spent with pt: 58 minutes           [1]   Patient Active Problem List  Diagnosis    Anxiety    Lumbar radiculopathy    DDD (degenerative disc disease), lumbar    Irregular menses    Counseling for birth control regarding intrauterine device (IUD)    Surveillance of (intrauterine) contraceptive device    Class 1 obesity without serious comorbidity with body mass index (BMI) of 34.0 to 34.9 in adult    IUD threads lost    DUB (dysfunctional uterine bleeding)    Endometrial polyp    Menorrhagia with irregular cycle    Neutropenia    Neutropenia, unspecified type    Weight loss    Vitamin B12 deficiency    HNP (herniated nucleus pulposus), lumbar    Sleep apnea    Status post lumbar surgery    Morbid (severe) obesity due to excess calories (HCC)    Nausea    Diarrhea    Hematochezia    Benign neoplasm of sigmoid colon    Gastric ulcer, unspecified    Therapeutic drug monitoring    Other fatigue    Vitamin D deficiency    Wheezing   [2]   Past Medical History:   Abdominal distention    Abdominal pain    Anemia    Anxiety    Arthritis    Back pain    Back problem    Bad breath    Belching    Bleeding nose    Bloating    Blood in the stool    Cervicalgia    Change in hair    Chest pain    Constipation    COVID-19    Depression     Depressive disorder, not elsewhere classified    Diarrhea, unspecified    Dizziness    Dysmenorrhea    Dysphonia    Easy bruising    Essential hypertension    During pregnancy    Fatigue    Flatulence/gas pain/belching    Frequent urination    Headache disorder    Heart palpitations    Heartburn    Hemorrhoids    High cholesterol    History of depression    History of eating disorder    Irregular bowel habits    Stool not forming    Itch of skin    After hospitalized for neutropenia    Leaking of urine    Major depressive disorder, recurrent episode, mild    Mouth sores    Nausea    Night sweats    Obesity    Pain in joint, pelvic region and thigh    Pain in joints    Pain with bowel movements    Palpitations    Post partum depression    hx w/1st pregnancy    Rash    Rhinitis, allergic    RLS (restless legs syndrome)    Shortness of breath    Sinusitis    Sjogren's disease (HCC)    Skin blushing/flushing    Rosacea    Sleep apnea    cpap    Sleep disturbance    Somnambulance    daytime    Stool incontinence    Stress    Urinary incontinence    Wears glasses    Weight gain    Wheezing   [3]   Allergies  Allergen Reactions    Codeine HIVES     vomitting    Penicillins HIVES    Sulfa Antibiotics HIVES           Dust Mites UNKNOWN   [4]   Current Outpatient Medications:     pantoprazole 20 MG Oral Tab EC, Take 1 tablet (20 mg total) by mouth every morning before breakfast., Disp: 90 tablet, Rfl: 0    fluticasone furoate (ARNUITY ELLIPTA) 100 MCG/ACT Inhalation Aerosol Powder, Breath Activated, Inhale 1 puff into the lungs daily. Rinse mouth, gargle, and spit to follow., Disp: 1 each, Rfl: 2    Spacer/Aero-Holding Chambers Does not apply Device, Use with albuterol, Disp: 1 each, Rfl: 0    Phentermine HCl 37.5 MG Oral Tab, Take 1 tablet (37.5 mg total) by mouth every morning before breakfast., Disp: 30 tablet, Rfl: 2    ALPRAZolam 0.5 MG Oral Tablet Dispersible, Take 1 tablet (0.5 mg total) by mouth daily as needed for  Anxiety (panic attacks)., Disp: 20 tablet, Rfl: 0    Doxycycline Hyclate 20 MG Oral Tab, Take 1 tablet (20 mg total) by mouth as needed., Disp: , Rfl:     Ciclopirox 1 % External Shampoo, , Disp: , Rfl:     Fluocinonide 0.05 % External Solution, , Disp: , Rfl:     OXYBUTYNIN ER 5 MG Oral Tablet 24 Hr, TAKE 1 TABLET(5 MG) BY MOUTH DAILY, Disp: 90 tablet, Rfl: 0

## 2025-06-13 ENCOUNTER — OFFICE VISIT (OUTPATIENT)
Dept: PULMONOLOGY | Facility: CLINIC | Age: 45
End: 2025-06-13

## 2025-06-13 VITALS
WEIGHT: 243 LBS | HEIGHT: 65 IN | HEART RATE: 74 BPM | SYSTOLIC BLOOD PRESSURE: 130 MMHG | OXYGEN SATURATION: 99 % | DIASTOLIC BLOOD PRESSURE: 84 MMHG | BODY MASS INDEX: 40.48 KG/M2

## 2025-06-13 DIAGNOSIS — G47.33 OSA (OBSTRUCTIVE SLEEP APNEA): Primary | ICD-10-CM

## 2025-06-13 PROCEDURE — 3008F BODY MASS INDEX DOCD: CPT | Performed by: PHYSICIAN ASSISTANT

## 2025-06-13 PROCEDURE — 3075F SYST BP GE 130 - 139MM HG: CPT | Performed by: PHYSICIAN ASSISTANT

## 2025-06-13 PROCEDURE — 99213 OFFICE O/P EST LOW 20 MIN: CPT | Performed by: PHYSICIAN ASSISTANT

## 2025-06-13 PROCEDURE — 3079F DIAST BP 80-89 MM HG: CPT | Performed by: PHYSICIAN ASSISTANT

## 2025-06-13 NOTE — PROGRESS NOTES
Pulmonary Progress Note    History of Present Illness:  Alberta Ledezma is a 44 year old female presenting to pulmonary clinic today for follow-up.     Patient has mild JOSE which becomes severe in REM. She had previously stopped using CPAP as she did not feel it helped. She resumed CPAP a few weeks ago and is unsure if it has helped. Data download demonstrates 48% usage in the last 30 days with average daily usage of 5 hours and 38 minutes and no residual respiratory events. 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 She was having difficulty initiating sleep and would lay awake for up to 1.5 hours and this is now resolved. She continues to have nocturnal awakenings although this is improved which she attributes to water restriction in the late evenings. No urge to move the limbs. She is not driving much since she is not currently working but denies drowsy driving. She still complains of unrefreshing sleep and excessive daytime sleepiness. She is working on weight loss and is planning to have bariatric surgery with Dr. Campos.    She has COVID-19 in March 2025 and had lingering cough, wheezing, and dyspnea. Pulmonary function testing May 2025 was normal. All respiratory symptoms have resolved.    Review of Systems:   Constitutional: No weight loss or weight gain.  HEENT: No congestion or postnasal drip.  Cardio: No chest pain.  Respiratory: No shortness of breath.  GI: No acid reflux.  Extremities: No lower extremity swelling or pain.   Neurologic: No headache.  Psych: +Anxiety. No depression.     Physical Exam:  /84   Pulse 74   Ht 5' 5\" (1.651 m)   Wt 243 lb (110.2 kg)   LMP 03/26/2025 (Approximate)   SpO2 99%   BMI 40.44 kg/m²    Constitutional: Obese. No acute distress.  HEENT: Head NC/AT. PEERL. No tonsillar or uvula enlargement.  Cardio: Regular rate and rhythm. Normal S1 and S2. No murmurs.   Respiratory: Thorax symmetrical with no labored  breathing. Clear to ausculation bilaterally with symmetrical breath sounds. No wheezing, rhonchi, or crackles.   Extremities: No clubbing or cyanosis. No LE edema.  Neurologic: A&Ox3. No gross motor deficits.  Skin: Warm, dry.  Psych: Pleasant affect. Cooperative.    Results:  -Home sleep study 4/2020: Combined AHI 5.1, supine AHI 12, REM AHI 36    -PFTs 5/2025: No airway obstruction. Lung volumes and diffusion capacity are normal.    Assessment/Plan:  JOSE  Patient had hypersomnia and drowsy driving. Has history of mild JOSE which becomes severe in REM, had discontinued CPAP, and recently resumed. Overall symptoms are improved but she feels this is because she is out of school for the summer and has significantly less stress/anxiety. Insomnia is resolved.  Plan:  -Recommend vigilance with CPAP every night all night  -Weight loss  -Avoid alcohol  -Avoid sedating drugs  -Never drive if sleepy  -Follow-up at the 1-year interval again with download of data  -Call if new problems in the interim    Jordi Craft PA-C  Pulmonary Medicine  6/13/2025

## 2025-06-19 ENCOUNTER — TELEPHONE (OUTPATIENT)
Dept: PULMONOLOGY | Facility: CLINIC | Age: 45
End: 2025-06-19

## 2025-06-19 NOTE — TELEPHONE ENCOUNTER
Received request from [facility] for CPAP resupply via parachute. Placed in Jordi's folder to sign.

## 2025-06-26 ENCOUNTER — OFFICE VISIT (OUTPATIENT)
Dept: INTERNAL MEDICINE CLINIC | Facility: CLINIC | Age: 45
End: 2025-06-26
Payer: COMMERCIAL

## 2025-06-26 VITALS
DIASTOLIC BLOOD PRESSURE: 82 MMHG | HEIGHT: 65 IN | BODY MASS INDEX: 39.99 KG/M2 | WEIGHT: 240 LBS | HEART RATE: 89 BPM | RESPIRATION RATE: 18 BRPM | SYSTOLIC BLOOD PRESSURE: 120 MMHG

## 2025-06-26 DIAGNOSIS — R53.83 OTHER FATIGUE: ICD-10-CM

## 2025-06-26 DIAGNOSIS — F50.819 BINGE EATING DISORDER, UNSPECIFIED SEVERITY: ICD-10-CM

## 2025-06-26 DIAGNOSIS — G47.33 OBSTRUCTIVE SLEEP APNEA SYNDROME: ICD-10-CM

## 2025-06-26 DIAGNOSIS — E66.01 MORBID (SEVERE) OBESITY DUE TO EXCESS CALORIES (HCC): ICD-10-CM

## 2025-06-26 DIAGNOSIS — E55.9 VITAMIN D DEFICIENCY: ICD-10-CM

## 2025-06-26 DIAGNOSIS — Z01.818 PREOPERATIVE CLEARANCE: ICD-10-CM

## 2025-06-26 DIAGNOSIS — E53.8 VITAMIN B12 DEFICIENCY: ICD-10-CM

## 2025-06-26 DIAGNOSIS — Z51.81 THERAPEUTIC DRUG MONITORING: Primary | ICD-10-CM

## 2025-06-26 PROCEDURE — 3008F BODY MASS INDEX DOCD: CPT | Performed by: INTERNAL MEDICINE

## 2025-06-26 PROCEDURE — 3079F DIAST BP 80-89 MM HG: CPT | Performed by: INTERNAL MEDICINE

## 2025-06-26 PROCEDURE — 3074F SYST BP LT 130 MM HG: CPT | Performed by: INTERNAL MEDICINE

## 2025-06-26 PROCEDURE — 99214 OFFICE O/P EST MOD 30 MIN: CPT | Performed by: INTERNAL MEDICINE

## 2025-06-26 RX ORDER — CLOBETASOL PROPIONATE 0.5 MG/ML
SOLUTION TOPICAL
COMMUNITY
Start: 2025-06-04

## 2025-06-26 NOTE — PROGRESS NOTES
HISTORY OF PRESENT ILLNESS  Chief Complaint   Patient presents with    Weight Check     Down 1        Last visit 6/12 with Rossana  Seeing Pulmonary on 6/13/15 and CPAP ordered    Alberta Ledezma is a 44 year old female here for follow up in medical weight loss program.   [facility] Follow Up    General Information  Success Moment: Lifting weights with my   Challenging Moment: Daily logging of food especially with recipes  Nutrition Recall  Breakfast: 2 pieces peanut butter toast usually on keto bread-7:30am   Lunch: Salad with carrots, radish, celery, cucumber, orzo, feta cheese,   hard boiled egg, slivered almonds, dried cranberries, lemon juice, olive   oil 1:00pm   Dinner: Pasta with sausage and marinara, 5:30pm Snacks: Greek yogurt 10am   Fluids: Coffee with half and half, 1 shot of vodka with 1 can of sprite   Dining Out: 2   Exercise   Patient stated exercises # days/week: 3  Patient stated perceived level of   exertion: 4 Anaerobic Days: 2   Aerobic Days: 3   Patient stated average level of stress: 2  Sleep   Patient stated # hours uninterrupted sleep: 3   Patient stated feels   restful: No      Cause of disruption of sleep: Frequent urination   Goals: Continued strength training more x per week              History of Present Illness  Alberta Ledezma is a 44 year old female who presents for a follow-up regarding her bariatric surgery preparation.    She is in the process of completing her preoperative requirements for bariatric surgery, having finished two out of five psychological evaluations. She is working on scheduling the remaining sessions more frequently.    She takes phentermine inconsistently, using it when she feels low in energy or after a day of poor eating. Her weight has remained stable with a slight decrease of one pound. She is satisfied with her current dietary habits, which include salads for lunch, peanut butter toast for breakfast, and balanced dinners.    She has completed her  CPAP training and received her CPAP machine. A follow-up is scheduled in six weeks to assess compliance. She has completed her cardiac clearance and is under surveillance for a gallbladder polyp, which requires annual ultrasounds.    She is actively engaging in physical activity, incorporating cardio into her routine. She is working with a dietitian to complete body composition analysis and a liquid diet plan, with follow-up visits scheduled in July and August.    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    Wt Readings from Last 6 Encounters:   06/26/25 240 lb (108.9 kg)   06/13/25 243 lb (110.2 kg)   06/12/25 243 lb 9.6 oz (110.5 kg)   05/27/25 239 lb 9.6 oz (108.7 kg)   05/06/25 241 lb 9.6 oz (109.6 kg)   04/28/25 240 lb (108.9 kg)              Breakfast Lunch Dinner Snacks Fluids   Reviewed           REVIEW OF SYSTEMS  GENERAL HEALTH: feels well otherwise, denied any fevers chills or night sweats   RESPIRATORY: denies shortness of breath   CARDIOVASCULAR: denies chest pain  GI: denies abdominal pain    EXAM  /82   Pulse 89   Resp 18   Ht 5' 5\" (1.651 m)   Wt 240 lb (108.9 kg)   LMP 03/26/2025 (Approximate)   BMI 39.94 kg/m²   GENERAL: well developed, well nourished,in no apparent distress, A/O x3  SKIN: no rashes,no suspicious lesions  HEENT: atraumatic, normocephalic, OP-clear, PERRL  NECK: supple,no adenopathy  LUNGS: clear to auscultation bilaterally   CARDIO: RRR without murmur  GI: good BS's,NT/ND, no masses or HSM  EXTREMITIES: no cyanosis, no clubbing, no edema    Lab Results   Component Value Date    WBC 9.3 11/13/2024    RBC 4.07 11/13/2024    HGB 13.3 11/13/2024    HCT 38.0 11/13/2024    MCV 90.5 03/04/2025    MCH 32.7 11/13/2024    MCHC 35.5 03/04/2025    RDW 12.5 11/13/2024    .0 11/13/2024     Lab Results   Component Value Date    GLU 79 11/13/2024    BUN 8 (L) 11/13/2024    BUNCREA 19.4 05/05/2021    CREATSERUM 0.80 11/13/2024     ANIONGAP 7 11/13/2024    GFRNAA 85 06/06/2022    GFRAA 98 06/06/2022    CA 9.1 11/13/2024    OSMOCALC 275 11/13/2024    ALKPHO 73 11/13/2024    AST 16 11/13/2024    ALT 32 11/13/2024    BILT 0.8 11/13/2024    TP 7.7 11/13/2024    ALB 3.9 11/13/2024    GLOBULIN 3.8 11/13/2024    AGRATIO 1.9 10/21/2013     (L) 11/13/2024    K 3.3 (L) 11/13/2024     11/13/2024    CO2 23.0 11/13/2024     Lab Results   Component Value Date     05/16/2024    A1C 5.2 05/16/2024     Lab Results   Component Value Date    CHOLEST 189 05/16/2024    TRIG 260 (H) 05/16/2024    HDL 36 (L) 05/16/2024     (H) 05/16/2024    VLDL 45 (H) 05/16/2024    NONHDLC 153 (H) 05/16/2024     Lab Results   Component Value Date    T4F 0.9 05/16/2024    TSH 1.860 05/16/2024    TSHT4 0.78 10/21/2013     Lab Results   Component Value Date    B12 251 05/16/2024     Lab Results   Component Value Date    VITD 17.2 (L) 05/16/2024       Medications Ordered Prior to Encounter[1]    ASSESSMENT  Analyzed weight data:     [facility] Information  Patient type: Non-Surgical   Initial Body Fat %: 42.1      Date of Initial Weight: 1/26/23 Initial Weight: 229     Initial BMI: 37.9         Have any comorbidities improved since the last visit?   Hypertension DM 2 Dyslipidemia Osteoarthritis Sleep Apnea                    Diagnoses and all orders for this visit:    Therapeutic drug monitoring  -     Phentermine HCl 37.5 MG Oral Tab; Take 1 tablet (37.5 mg total) by mouth every morning before breakfast.    Obstructive sleep apnea syndrome  -     Phentermine HCl 37.5 MG Oral Tab; Take 1 tablet (37.5 mg total) by mouth every morning before breakfast.    Preoperative clearance    Morbid (severe) obesity due to excess calories (HCC)  -     Phentermine HCl 37.5 MG Oral Tab; Take 1 tablet (37.5 mg total) by mouth every morning before breakfast.    Binge eating disorder, unspecified severity  -     Phentermine HCl 37.5 MG Oral Tab; Take 1 tablet (37.5 mg total) by  mouth every morning before breakfast.    Other fatigue  -     Phentermine HCl 37.5 MG Oral Tab; Take 1 tablet (37.5 mg total) by mouth every morning before breakfast.    Vitamin D deficiency  -     Phentermine HCl 37.5 MG Oral Tab; Take 1 tablet (37.5 mg total) by mouth every morning before breakfast.    Vitamin B12 deficiency  -     Phentermine HCl 37.5 MG Oral Tab; Take 1 tablet (37.5 mg total) by mouth every morning before breakfast.        PLAN  Assessment & Plan  Obesity  Obesity management ongoing with preparation for bariatric surgery. Phentermine used inconsistently. Weight stable with healthy habits. Discussed BMI stability, surgical options, and risks. Prefers gastric bypass for significant weight loss.  - Continue phentermin  - Complete remaining psychological evaluations.  - Schedule body composition analysis with dietitian in July.  - Schedule follow-up with dietitian in August.  - Schedule preoperative clearance with Doctor Oh after completing psychological evaluations.  - Discuss surgical options with Doctor Oh closer to surgery date.    Sleep apnea  Sleep apnea managed with CPAP therapy. Completed CPAP training and received device.  - Use CPAP device as instructed.  - Anticipate a six-week follow-up for CPAP compliance.      Vit D - continue replacement     VIt B12 - continue replacement    There are no Patient Instructions on file for this visit.    No follow-ups on file.    Patient verbalizes understanding.    Stefany Maki MD           [1]   Current Outpatient Medications on File Prior to Visit   Medication Sig Dispense Refill    clobetasol 0.05 % External Solution       pantoprazole 20 MG Oral Tab EC Take 1 tablet (20 mg total) by mouth every morning before breakfast. 90 tablet 0    fluticasone furoate (ARNUITY ELLIPTA) 100 MCG/ACT Inhalation Aerosol Powder, Breath Activated Inhale 1 puff into the lungs daily. Rinse mouth, gargle, and spit to follow. 1 each 2    Spacer/Aero-Holding Chambers Does not  apply Device Use with albuterol 1 each 0    ALPRAZolam 0.5 MG Oral Tablet Dispersible Take 1 tablet (0.5 mg total) by mouth daily as needed for Anxiety (panic attacks). 20 tablet 0    Doxycycline Hyclate 20 MG Oral Tab Take 1 tablet (20 mg total) by mouth as needed.      Ciclopirox 1 % External Shampoo       Fluocinonide 0.05 % External Solution       OXYBUTYNIN ER 5 MG Oral Tablet 24 Hr TAKE 1 TABLET(5 MG) BY MOUTH DAILY 90 tablet 0     No current facility-administered medications on file prior to visit.

## 2025-06-30 RX ORDER — PHENTERMINE HYDROCHLORIDE 37.5 MG/1
37.5 TABLET ORAL
Qty: 30 TABLET | Refills: 2 | Status: SHIPPED | OUTPATIENT
Start: 2025-06-30

## 2025-07-14 ENCOUNTER — PATIENT MESSAGE (OUTPATIENT)
Dept: PULMONOLOGY | Facility: CLINIC | Age: 45
End: 2025-07-14

## 2025-07-14 ENCOUNTER — PATIENT MESSAGE (OUTPATIENT)
Dept: INTERNAL MEDICINE CLINIC | Facility: CLINIC | Age: 45
End: 2025-07-14

## 2025-07-14 DIAGNOSIS — E66.01 MORBID (SEVERE) OBESITY DUE TO EXCESS CALORIES (HCC): ICD-10-CM

## 2025-07-14 DIAGNOSIS — Z01.818 PREOPERATIVE CLEARANCE: Primary | ICD-10-CM

## 2025-07-14 DIAGNOSIS — F50.819 BINGE EATING DISORDER, UNSPECIFIED SEVERITY: ICD-10-CM

## 2025-07-14 DIAGNOSIS — R53.83 OTHER FATIGUE: ICD-10-CM

## 2025-07-14 DIAGNOSIS — G47.33 OBSTRUCTIVE SLEEP APNEA SYNDROME: ICD-10-CM

## 2025-07-15 ENCOUNTER — TELEPHONE (OUTPATIENT)
Dept: INTERNAL MEDICINE CLINIC | Facility: CLINIC | Age: 45
End: 2025-07-15

## 2025-07-15 NOTE — TELEPHONE ENCOUNTER
She will need a visit for preop clearance letter  Message me after she sees Dr. Campos if they set a surgery date

## 2025-07-17 ENCOUNTER — OFFICE VISIT (OUTPATIENT)
Dept: INTERNAL MEDICINE CLINIC | Facility: CLINIC | Age: 45
End: 2025-07-17
Payer: COMMERCIAL

## 2025-07-17 VITALS — HEIGHT: 65 IN | BODY MASS INDEX: 40.32 KG/M2 | WEIGHT: 242 LBS

## 2025-07-17 DIAGNOSIS — E66.813 CLASS 3 SEVERE OBESITY WITH BODY MASS INDEX (BMI) OF 40.0 TO 44.9 IN ADULT, UNSPECIFIED OBESITY TYPE, UNSPECIFIED WHETHER SERIOUS COMORBIDITY PRESENT: Primary | ICD-10-CM

## 2025-07-17 PROCEDURE — 3008F BODY MASS INDEX DOCD: CPT

## 2025-07-17 PROCEDURE — 97803 MED NUTRITION INDIV SUBSEQ: CPT

## 2025-07-17 NOTE — PATIENT INSTRUCTIONS
Recommendations/goals:   Keep it going goals:   1.  Continue to keep a food record, My Net Diary, select the macros dashboard.  2.  Continue  to consume at least 4-6 meals/snacks per day.  Include protein and produce when you eat.  Aim for  grams of protein per day.  Try to keep the carbohydrates at 100-120 grams per day or less.    3.  Continue to practice eating strategies, eat separately from drinking-wait 30 minutes after eating to drink, avoid straws and chewing gum, use smaller plates/bowls/utensils, chew food 20-30 times before swallowing.  Make the meals last 30 minutes.  4.  Continue to drink at least 64 oz per day of water.  (Try adding lemon, mint or fruit to water, Protein 2 O water, add True Lemon, Crystal Light, use a measured container).  5.  Eliminate carbonation.  6.  Continue to exercise with a goal of 30 minutes per day for exercise (for example,walking).  (I burn 8 calories per minute of walking).  7.  Continue to strength training at least 10 minutes 3 days per week.  (7 minute workout song on YouTube) or (Gym Rat no more-18 at home exercises)   8.  Taste test some different protein shakes.     Work in progress goals:     1.  Buy the bariatric chewable vitamin, Bariatric Choice.  2.  Line up your protein supplements for liquid protein. For example, 5 per day of Ensure High Protein.  3.  Buy 1 bottle of CHG soap.  4.  Buy the Ensure -Pre Surgery drink, 2 bottles.  5.  Buy the liquid or dissolvable pack of Tylenol.  6.  Buy Benefiber supplement to begin taking during liquid protein.   Questions? Email [email or web address]

## 2025-07-17 NOTE — PROGRESS NOTES
[facility] BARIATRIC AND WEIGHT LOSS CLINIC  [address]  [facility] [address]  Dept: [phone]  Loc: [phone]    07/17/25      Liquid Protein Diet Nutrition Session    Alberta Ledezma is a 44 year old female.     Assessment     Procedure:  Gastric Bypass  Here for medical weight management: yes  Revision:  no  Surgery Date:  tbd  Medical Diagnosis:  obesity grade III, Parkinson's disease, vit B12 deficiency     Labs:  Lab Results   Component Value Date    GLU 79 11/13/2024    BUN 8 (L) 11/13/2024    BUNCREA 19.4 05/05/2021    CREATSERUM 0.80 11/13/2024    ANIONGAP 7 11/13/2024    GFRNAA 85 06/06/2022    GFRAA 98 06/06/2022    CA 9.1 11/13/2024    OSMOCALC 275 11/13/2024    ALKPHO 73 11/13/2024    AST 16 11/13/2024    ALT 32 11/13/2024    BILT 0.8 11/13/2024    TP 7.7 11/13/2024    ALB 3.9 11/13/2024    GLOBULIN 3.8 11/13/2024    AGRATIO 1.9 10/21/2013     (L) 11/13/2024    K 3.3 (L) 11/13/2024     11/13/2024    CO2 23.0 11/13/2024     Lab Results   Component Value Date    MG 2.3 08/06/2021      No results found for: \"PHOS\"    Thyroid:    Lab Results   Component Value Date    TSH 1.860 05/16/2024    TSH 2.190 02/13/2023    TSH 2.220 08/05/2021    T4F 0.9 05/16/2024    T4F 0.8 02/13/2023    T4F 1.0 05/05/2021       Iron Panel:  No results found for: \"IRON\", \"IRONTOT\", \"TIBC\", \"IRONSAT\", \"TRANSFERRIN\", \"TIBCP\", \"IRONBIND\", \"SAT\", \"SATUR\"    CBC:  Lab Results   Component Value Date    WBC 9.3 11/13/2024    WBC 8.5 10/09/2023    WBC 11.3 (H) 10/01/2023     Lab Results   Component Value Date    HEMOGLOBIN 13.6 03/04/2025    HGB 13.3 11/13/2024    HGB 13.6 10/09/2023    HGB 13.2 10/01/2023      Lab Results   Component Value Date    .0 11/13/2024    .0 10/09/2023    .0 10/01/2023       Diabetes:    Lab Results   Component Value Date     05/16/2024    A1C 5.2 05/16/2024       Lipid Panel:  Lab Results   Component Value Date    CHOLEST 189  05/16/2024    TRIG 260 (H) 05/16/2024    HDL 36 (L) 05/16/2024     (H) 05/16/2024    VLDL 45 (H) 05/16/2024    NONHDLC 153 (H) 05/16/2024        Vitamins/Minerals:  Lab Results   Component Value Date    B12 251 05/16/2024     Lab Results   Component Value Date    VITD 17.2 (L) 05/16/2024     No results found for: \"THIAMINE\"   No results found for: \"VITB1\"  Lab Results   Component Value Date/Time    FOLIC 11.3 05/16/2024 08:02 AM        Meds:   Medications - Current[1]    Height:    Ht Readings from Last 1 Encounters:   07/17/25 5' 5\" (1.651 m)     Weight:    Wt Readings from Last 6 Encounters:   07/17/25 242 lb   06/26/25 240 lb   06/13/25 243 lb   06/12/25 243 lb 9.6 oz   05/27/25 239 lb 9.6 oz   05/06/25 241 lb 9.6 oz       Weight change:  down 0.4 lbs since LOV 6/12/25  Post-Op Excess Body Weight Loss: n/a% EBWL  BMI: Body mass index is 40.27 kg/m².    Protein Intake: 92,107, 124, 54, 121, 130 (105 gm avg)/day  Carbs: 176, 122, 146, 74, 106 (125 gm/day)  Fluid intake:  67-85 ounces/day    Diet history:       Breakfast      AM Snack       Lunch     PM Snack     Dinner Evening Snack   7:30-8:30 am- Protein shake and keto bread with peanut butter or  egg casserole or egg sandwich on Keto bread with stewart and cheese  skips Protein shake and pb on keto bread or  salad with almonds eggs or pb sandwich on keto bread   avocado Salad with eggs and cheese or chicken teriyaki and rice or pizza or protein and vegetable and starch                 skips      Total Calories:  1474  Excessive in: nothing  Inadequate in:  nothing     Patient has made some modifications and adjustments to diet: yes, per pt is eating separately from drinking-waiting 30 minutes after eating to drink, is chewing food well before swallowing, eating more slowly, is eating smaller portions, is eating more protein, is eating at least 5 times per day,  is eating 2-3 servings of fruit per day and 3-4 servings of vegetables per day, eliminated  coffee, drinking 1 can sparkling water per day with plan to eliminate.    Food intolerances:  no  Vitamin/mineral supplements:  Adult MVI and Vit D, biotin and collagen  Protein supplements:  Premier Protein     Activity Level: active  Type: bike and other: and strength training    Duration: 120 minutes, 450 minutes per week for strength  Frequency: per week    Other:Met with pt for Liquid protein diet instruction.  Per pt is keeping a food record.  Per pt is consuming 1474 calories, 105 gm protein, and 125 gm carbs per day.  Pt diet is meeting protein needs while keeping carbs appropriately low.  Per pt has practiced making modifications to diet.  Per pt is eating separately from drinking-waiting 30 minutes after eating to drink, is chewing food well before swallowing, eating more slowly, is eating smaller portions, is eating more protein, is eating at least 5 times per day,  is eating 2-3 servings of fruit per day and 3-4 servings of vegetables per day, eliminated coffee, drinking 1 can sparkling water per day with plan to eliminate..  Reports drinking 67-85 oz of water per day.  Per pt is exercising doing 120 minutes of cardio type exercise and 450 minutes of strength training per week. Reviewed quizzes in binder. Pt scored A+ on all quizzes after review. Reviewed liquid protein diet.  Pt is ready for surgery from a dietary standpoint.  Pt is scheduled for body comp next week.   First post op visit schedule for end of August.      Nutrition Diagnosis     Nutrition Diagnosis: Morbid obesity: Improvement shown     Intervention     Recommendations/goals:   Keep it going goals:   1.  Continue to keep a food record, My Net Diary, select the macros dashboard.  2.  Continue  to consume at least 4-6 meals/snacks per day.  Include protein and produce when you eat.  Aim for  grams of protein per day.  Try to keep the carbohydrates at 100-120 grams per day or less.    3.  Continue to practice eating strategies, eat  separately from drinking-wait 30 minutes after eating to drink, avoid straws and chewing gum, use smaller plates/bowls/utensils, chew food 20-30 times before swallowing.  Make the meals last 30 minutes.  4.  Continue to drink at least 64 oz per day of water.  (Try adding lemon, mint or fruit to water, Protein 2 O water, add True Lemon, Crystal Light, use a measured container).  5.  Eliminate carbonation.  6.  Continue to exercise with a goal of 30 minutes per day for exercise (for example,walking).  (I burn 8 calories per minute of walking).  7.  Continue to strength training at least 10 minutes 3 days per week.  (7 minute workout song on YouTube) or (Gym Rat no more-18 at home exercises)   8.  Taste test some different protein shakes.     Work in progress goals:     1.  Buy the bariatric chewable vitamin, Bariatric Choice.  2.  Line up your protein supplements for liquid protein. For example, 5 per day of Ensure High Protein.  3.  Buy 1 bottle of CHG soap.  4.  Buy the Ensure -Pre Surgery drink, 2 bottles.  5.  Buy the liquid or dissolvable pack of Tylenol.  6.  Buy Benefiber supplement to begin taking during liquid protein.   Questions? Email [email or web address]   Monitor/Evaluate     Anthropometric measurements, Food/fluid intake/choices, Food intolerances, Activity level, Vitamin/mineral supplementation, Reinforce goals, and Calorie/protein intake  Additional RD visits required to review concepts? yes  Patient understands protein requirements?yes  Patient understand fluid requirements (amount and method of intake)? yes  Patient understands post-operative diet? yes  Patient keeping consistent food records? yes  Patient ready for Liquid Protein Education? yes      Rossana Garcia, JOSESITO, LDN    Face-to-face time spent with pt: 69 minutes             [1]   Current Outpatient Medications:     Phentermine HCl 37.5 MG Oral Tab, Take 1 tablet (37.5 mg total) by mouth every morning before breakfast., Disp: 30  tablet, Rfl: 2    clobetasol 0.05 % External Solution, , Disp: , Rfl:     pantoprazole 20 MG Oral Tab EC, Take 1 tablet (20 mg total) by mouth every morning before breakfast., Disp: 90 tablet, Rfl: 0    fluticasone furoate (ARNUITY ELLIPTA) 100 MCG/ACT Inhalation Aerosol Powder, Breath Activated, Inhale 1 puff into the lungs daily. Rinse mouth, gargle, and spit to follow., Disp: 1 each, Rfl: 2    Spacer/Aero-Holding Chambers Does not apply Device, Use with albuterol, Disp: 1 each, Rfl: 0    ALPRAZolam 0.5 MG Oral Tablet Dispersible, Take 1 tablet (0.5 mg total) by mouth daily as needed for Anxiety (panic attacks)., Disp: 20 tablet, Rfl: 0    Doxycycline Hyclate 20 MG Oral Tab, Take 1 tablet (20 mg total) by mouth as needed., Disp: , Rfl:     Ciclopirox 1 % External Shampoo, , Disp: , Rfl:     Fluocinonide 0.05 % External Solution, , Disp: , Rfl:     OXYBUTYNIN ER 5 MG Oral Tablet 24 Hr, TAKE 1 TABLET(5 MG) BY MOUTH DAILY, Disp: 90 tablet, Rfl: 0

## 2025-07-22 ENCOUNTER — OFFICE VISIT (OUTPATIENT)
Dept: SURGERY | Facility: CLINIC | Age: 45
End: 2025-07-22
Payer: COMMERCIAL

## 2025-07-22 VITALS — WEIGHT: 240.5 LBS | BODY MASS INDEX: 40.07 KG/M2 | HEIGHT: 65 IN

## 2025-07-22 DIAGNOSIS — E66.813 CLASS 3 SEVERE OBESITY WITH BODY MASS INDEX (BMI) OF 40.0 TO 44.9 IN ADULT, UNSPECIFIED OBESITY TYPE, UNSPECIFIED WHETHER SERIOUS COMORBIDITY PRESENT: Primary | ICD-10-CM

## 2025-07-22 PROCEDURE — 3008F BODY MASS INDEX DOCD: CPT

## 2025-07-22 PROCEDURE — 0358T BIA WHOLE BODY: CPT

## 2025-07-22 NOTE — PATIENT INSTRUCTIONS
Keep it going goals:   1.  Continue to keep a food record, My Net Diary, select the macros dashboard.  2.  Continue  to consume at least 4-6 meals/snacks per day.  Include protein and produce when you eat.  Aim for  grams of protein per day.  Try to keep the carbohydrates at 100-120 grams per day or less.    3.  Continue to practice eating strategies, eat separately from drinking-wait 30 minutes after eating to drink, avoid straws and chewing gum, use smaller plates/bowls/utensils, chew food 20-30 times before swallowing.  Make the meals last 30 minutes.  4.  Continue to drink at least 64 oz per day of water.  (Try adding lemon, mint or fruit to water, Protein 2 O water, add True Lemon, Crystal Light, use a measured container).  5.  Continue to avoid carbonation.  6.  Continue to exercise with a goal of 30 minutes per day for exercise (for example,walking).  (I burn 8 calories per minute of walking).  7.  Continue to strength training at least 10 minutes 3 days per week.  (7 minute workout song on YouTube) or (Gym Rat no more-18 at home exercises) . Focus on overall body especially legs right leg in particular.  8.  Taste test some different protein shakes.      Work in progress goals:      1.  Buy the bariatric chewable vitamin, Bariatric Choice.  2.  Line up your protein supplements for liquid protein. For example, 5 per day of Ensure High Protein.  3.  Buy 1 bottle of CHG soap.  4.  Buy the Ensure -Pre Surgery drink, 2 bottles.  5.  Buy the liquid or dissolvable pack of Tylenol.  6.  Buy Benefiber supplement to begin taking during liquid protein.   Questions? Email [email or web address]

## 2025-07-22 NOTE — PROGRESS NOTES
Body Composition Analysis #1     1. Weight 240.5 lbs     2. BMI 40.0     3. BMR 1578 kcal     4. Percent body fat 48.7% (117.3 lbs)     5. Visceral fat level 24 (goal is under 10)     6. ECW/TBW 0.371     7. SMM (skeletal muscle mass) 69.7 lbs                  Lean body mass for arms (R & L) 7.54 lbs, 125.4% & 7.30 lbs, 121.3%                  Lean body mass for trunk 59.3 lbs, 111.3%                   Lean body mass for legs (R & L) 18.89 lbs, 100.4% & 18.32 lbs, 97.3%     8. Body fat mass 117.3 lbs      9. Recommended Body fat amount loss 80.5 lbs    10. Recommendations/Status       Keep it going goals:   1.  Continue to keep a food record, My Net Diary, select the macros dashboard.  2.  Continue  to consume at least 4-6 meals/snacks per day.  Include protein and produce when you eat.  Aim for  grams of protein per day.  Try to keep the carbohydrates at 100-120 grams per day or less.    3.  Continue to practice eating strategies, eat separately from drinking-wait 30 minutes after eating to drink, avoid straws and chewing gum, use smaller plates/bowls/utensils, chew food 20-30 times before swallowing.  Make the meals last 30 minutes.  4.  Continue to drink at least 64 oz per day of water.  (Try adding lemon, mint or fruit to water, Protein 2 O water, add True Lemon, Crystal Light, use a measured container).  5.  Continue to avoid carbonation.  6.  Continue to exercise with a goal of 30 minutes per day for exercise (for example,walking).  (I burn 8 calories per minute of walking).  7.  Continue to strength training at least 10 minutes 3 days per week.  (7 minute workout song on YouTube) or (Gym Rat no more-18 at home exercises) . Focus on overall body especially legs right leg in particular.  8.  Taste test some different protein shakes.      Work in progress goals:      1.  Buy the bariatric chewable vitamin, Bariatric Choice.  2.  Line up your protein supplements for liquid protein. For example, 5 per day of  Ensure High Protein.  3.  Buy 1 bottle of CHG soap.  4.  Buy the Ensure -Pre Surgery drink, 2 bottles.  5.  Buy the liquid or dissolvable pack of Tylenol.  6.  Buy Benefiber supplement to begin taking during liquid protein.   Questions? Email [email or web address]   Discussed above with patient.  Encouraged pt to focus on overall body especially legs in particular left leg when doing strength training.  Pt verbalized understanding.   Time Spent with patient: 25  minutes  Rossana Garcia RD, LDN

## 2025-08-02 ENCOUNTER — HOSPITAL ENCOUNTER (OUTPATIENT)
Dept: MAMMOGRAPHY | Facility: HOSPITAL | Age: 45
Discharge: HOME | End: 2025-08-02
Attending: FAMILY MEDICINE

## 2025-08-02 DIAGNOSIS — Z12.31 ENCOUNTER FOR SCREENING MAMMOGRAM FOR HIGH-RISK PATIENT: ICD-10-CM

## 2025-08-02 PROCEDURE — 77063 BREAST TOMOSYNTHESIS BI: CPT | Performed by: FAMILY MEDICINE

## 2025-08-02 PROCEDURE — 77067 SCR MAMMO BI INCL CAD: CPT | Performed by: FAMILY MEDICINE

## 2025-08-25 ENCOUNTER — TELEPHONE (OUTPATIENT)
Dept: INTERNAL MEDICINE CLINIC | Facility: CLINIC | Age: 45
End: 2025-08-25

## (undated) DIAGNOSIS — Z12.31 BREAST CANCER SCREENING BY MAMMOGRAM: Primary | ICD-10-CM

## (undated) DEVICE — GAUZE SPONGES,12 PLY: Brand: CURITY

## (undated) DEVICE — RF CANNULA, CVD: Brand: VENOM

## (undated) DEVICE — LIGHT HANDLE

## (undated) DEVICE — BANDAID COVERLET 1X3

## (undated) DEVICE — AVANOS* TUOHY EPIDURAL NEEDLE: Brand: AVANOS

## (undated) DEVICE — GYN CDS: Brand: MEDLINE INDUSTRIES, INC.

## (undated) DEVICE — Device

## (undated) DEVICE — STERILE POLYISOPRENE POWDER-FREE SURGICAL GLOVES: Brand: PROTEXIS

## (undated) DEVICE — GLOVE SURG SENSICARE SZ 7

## (undated) DEVICE — SOL  .9 1000ML BTL

## (undated) DEVICE — Device: Brand: INTELLICART™

## (undated) DEVICE — REMOVER DURAPREP 3M

## (undated) DEVICE — NEEDLE SPINAL 22X5 405148

## (undated) DEVICE — PENCIL TELESCOPE MEGADYNE SE

## (undated) DEVICE — PAIN TRAY: Brand: MEDLINE INDUSTRIES, INC.

## (undated) DEVICE — SOL  .9 3000ML

## (undated) DEVICE — GLOVE SURG SENSICARE SZ 7-1/2

## (undated) DEVICE — CATH IV 14G X 5-1/4 ANGIO

## (undated) DEVICE — TUBING CYSTO

## (undated) DEVICE — MARKER SKIN 2 TIP

## (undated) DEVICE — SHEET, T, LAPAROTOMY, STERILE: Brand: MEDLINE

## (undated) DEVICE — GLOVE SURG SENSICARE SZ 6-1/2

## (undated) DEVICE — STERILE SYNTHETIC POLYISOPRENE POWDER-FREE SURGICAL GLOVES WITH HYDROGEL COATING: Brand: PROTEXIS

## (undated) DEVICE — SUT VICRYL 2-0 CT-2 J726D

## (undated) DEVICE — CLOSURE EXOFIN 1.0ML

## (undated) DEVICE — KENDALL SCD EXPRESS SLEEVES, KNEE LENGTH, MEDIUM: Brand: KENDALL SCD

## (undated) DEVICE — SYRINGE 30ML LL TIP

## (undated) DEVICE — NEEDLE SPINAL 22X3-1/2 BLK

## (undated) DEVICE — PEN SKIN MARKING REG TIP VIOLT

## (undated) DEVICE — DRAPE C-ARM UNIVERSAL

## (undated) DEVICE — PAD GROUND ELECTRODE 1.5M

## (undated) DEVICE — SUT VICRYL 3-0 RB-1 J713D

## (undated) DEVICE — MICRO COVER: Brand: UNBRANDED

## (undated) DEVICE — REM POLYHESIVE ADULT PATIENT RETURN ELECTRODE: Brand: VALLEYLAB

## (undated) DEVICE — ELECTRODE EDGE PENCIL 10FT

## (undated) DEVICE — SLEEVE KENDALL SCD EXPRESS MED

## (undated) DEVICE — C-ARM: Brand: UNBRANDED

## (undated) DEVICE — DRAPE,LAPAROTOMY,PCH,STERILE: Brand: MEDLINE

## (undated) DEVICE — SOLUTION  .9 1000ML BTL

## (undated) DEVICE — 3.0MM PRECISION NEURO (MATCH HEAD)

## (undated) DEVICE — LAMINECTOMY CDS: Brand: MEDLINE INDUSTRIES, INC.

## (undated) DEVICE — GOWN AERO CHROME XXL

## (undated) DEVICE — DRILL SRG OIL CRTDG MAESTRO

## (undated) DEVICE — ISOPROPYL ALCOHOL 70% 4OZ BTL

## (undated) DEVICE — REMOVER PREP SOLUTION 4OZ

## (undated) DEVICE — BANDAGE ADH COVERLET 3X1IN

## (undated) DEVICE — SUT VICRYL 0 CT-1 J470D

## (undated) NOTE — LETTER
Patient Name: Jolie Diaz  YOB: 1980          MRN number:  [id number]  Date:  12/19/2017  Referring Physician:  Harman Kent     Discharge Summary       Pt has attended 8, cancelled 0, and no shown 0 visits in Physical Therapy. Hip Flexor: B wfl (was:R tight, L tight)  Hamstrings: R 100; L 100  Piriformis: R wnl; L wnl  Quads: R wnl; L wnl  Gastroc-soleus: R wnl; L wnl      Special tests: SLR neg, slump neg        PLAN OF CARE:    Goals:    · Pt will improve lower abdominal recru

## (undated) NOTE — Clinical Note
Martha Pollard,    I saw Davida Graves today and she's had a flare of her pain. She's going to proceed with the injection today. I advised her if no improvement, she should inform our office and we can consider moving her surgical date up. Her strength is stable to improved. She'll be seeing you 6/9 s/p injections. She's scheduled with Cheyanne for surgery in 7/2022. Thanks!     [facility]

## (undated) NOTE — LETTER
[address]iz [address] [address]  [place], [address] [facility]    Consent for Operation  Date: __________________                                Time: _______________    1.  I authorize the performance upon Mahsa Bass the following operation:  Proc procedure has been videotaped, the surgeon will obtain the original videotape. The hospital will not be responsible for storage or maintenance of this tape.     6. For the purpose of advancing medical education, I consent to the admittance of observers to t STATEMENTS REQUIRING INSERTION OR COMPLETION WERE FILLED IN.     Signature of Patient:   ___________________________    When the patient is a minor or mentally incompetent to give consent:  Signature of person authorized to consent for patient: ____________

## (undated) NOTE — LETTER
Patient Name: Krysta Gold  YOB: 1980          MRN number:  [id number]  Date:  9/26/2017  Referring Physician:  Ida Jordan          SPINE EVALUATION:    Referring Physician: Dr. Wagner Giordano  Diagnosis: DDD     Date of Service: 9/25/201 from skilled Physical Therapy to address the above impairments to     Precautions:  None  OBJECTIVE:   Observation/Posture: anterior R ilium  Gait: unremarkable   Neuro Screen: intact light touch, symmetrically, +2 B LE's DTR's    Lumbar ROM:   Flexion: 13 · Pt will report improved symptom centralization and absence of radicular symptoms for 3 consecutive days to improve function with ADL (8 visits)  · Pt will have decreased paraspinal mm tension for improved lumbar mobility to tolerate walking >30 minutes f

## (undated) NOTE — LETTER
Estevan Ledezma, :10/14/1980    CONSENT FOR PROCEDURE/SEDATION    1. I authorize the performance upon Pamela Prom  the following: Insertion Intrauterine Device    2.  I authorize JULIUS Zhou (and whomever is designated as the Relationship to patient: ____________________________________________    Witness: _________________________________________ Date:___________     Physician Signature: _______________________________ Date:___________

## (undated) NOTE — MR AVS SNAPSHOT
After Visit Summary   1/30/2021    Carli Ledezma    MRN: [id number]           Visit Information     Date & Time  1/30/2021 10:00 AM Provider  Rayo Carver MD Department  [facility] [address], [address], [address]  Phone  [phone] y VITAMIN D, 25-HYDROXY [[id number] CUSTOM]  1/30/2021 (Approximate) 1/30/2022      Imaging Scheduling Instructions     Around January 30, 2021   Imaging:   [facility] FREDY 2D+3D SCREENING BILAT (TIG=[id number])    Instructions:  To schedule an appointment for your r WALK-IN CARE  Primary Care Providers  Treatment for acute illness   or injury that are   non-life-threatening.   Also available by appointment     Average cost  $70*       [facility]    [address] – [address] [facility]

## (undated) NOTE — LETTER
Marbella Armendariz [address] [address]  [place], [address] [facility]    Consent for Operation  Date: __________________                                Time: _______________    1.  I authorize the performance upon Guo Hope the following operation:  Proc procedure has been videotaped, the surgeon will obtain the original videotape. The hospital will not be responsible for storage or maintenance of this tape.   7. For the purpose of advancing medical education, I consent to the admittance of observers to the STATEMENTS REQUIRING INSERTION OR COMPLETION WERE FILLED IN.     Signature of Patient:   ___________________________    When the patient is a minor or mentally incompetent to give consent:  Signature of person authorized to consent for patient: ____________ supplements, and pills I can buy without a prescription (including street drugs/illegal medications). Failure to inform my anesthesiologist about these medicines may increase my risk of anesthetic complications. iv.  If I am allergic to anything or have ha Anesthesiologist Signature     Date   Time  I have discussed the procedure and information above with the patient (or patient’s representative) and answered their questions. The patient or their representative has agreed to have anesthesia services.     ___

## (undated) NOTE — LETTER
2022      RE: Suellen Vidal     : 10/14/1980    Dear Dr. Kevin Alvarado,    This letter is to inform you that your patient is being scheduled for surgery with Dr. Gloria Shah on 22 at [facility]. We have asked the patient to contact your office to schedule a pre-operative exam/visit. Diagnosis: Lumbar radiculopathy M54.16  Lumbar disc herniation M51.26  Lumbar facet arthropathy M47.816  DDD (degenerative disc disease), lumbar M51.36    Procedure: LEFT LUMBAR 4 - LUMBAR 5 FRAGMENTECOMY AND MICRODISCECTOMY    A Pre-operative History & Physical is needed for medical clearance. Please address patient's active problems (i.e high blood pressure, breathing issues etc.)  Pre-op labs are scheduled through the [facility] 56.. If any labs/testing are being done through the PCP office, then results should be faxed to the pre-admission testing department at [facility] [phone]. Our pre-operative lab orders are located in our surgery order, if the patient would like these done through your office, you will need to place separate orders. Please fax clearance letter/office visit note to the Pre-Admission department or our office at fax #: [phone]. Your office note must clearly indicate if the patient is medically cleared for surgery or not. The following orders will be placed by pre-admission testing:  CBC  Comprehensive Metabolic Panel  Type and Screen (if applicable)  PT/PTT/INR  MRSA/MSSA Nasal Swab  Covid-19 testing  (*And any other pertinent testing based on patient's current clinical condition.)      If you have any questions, you may contact our office at [phone], option # 3.     Thank you,    MATY STaff

## (undated) NOTE — LETTER
10/12/2021    To Whom it may concern: This letter has been written at the patient's request. The above patient was seen at the [facility] for treatment of a medical condition on 10/11/21.     Due to Alberta's current medical conditi

## (undated) NOTE — LETTER
OUTSIDE TESTING RESULT REQUEST     IMPORTANT: FOR YOUR IMMEDIATE ATTENTION  Please FAX all test results listed below to: [phone]     Testing already done on or about: 22    * * * * If testing is NOT complete, arrange with patient A.S.A.P. * * * *      Patient Name: Helga   Surgery Date: 2022  CSN: [id number]  Medical Record: [id number]   : 10/14/1980 - A: 39 y      Sex: female  Surgeon(s):  Jimenez Maier MD  Procedure: LEFT LUMBAR 4 - LUMBAR 5 FRAGMENTECOMY AND MICRODISCECTOMY AND ALL INDICATED PROCEDURES  Anesthesia Type: General     Surgeon: Jimenez Maier MD     The following Testing and Time Line are REQUIRED PER ANESTHESIA     PT/INR within  30 days  PTT within  30 days  MSSA/MRSA Nasal screening within 30 days      Thank You,   Sent by: Louann Logan  5/13/15

## (undated) NOTE — LETTER
[facility] Testing Department  Phone: [phone]  OUTSIDE TESTING RESULT REQUEST      TO:   Dr. Tootie Quach Today's Date: 7/21/20    FAX #: [phone]     IMPORTANT: FOR YOUR IMMEDIATE ATTENTION  Please FAX all test results listed below to: [phone]

## (undated) NOTE — LETTER
Conscious Sedation for Pain Procedures    Conscious sedation is a combination of medicines to help you relax and to block pain during the pain procedure; you will probably stay awake and be relaxed.  You will receive the medicine through an intravenous line Page: 1 of  2    This is not part of the LEGAL Patient Chart

## (undated) NOTE — LETTER
11/13/19        Gaurav Infante [place] [address] [address] [id number]      Dear Wayne Urbano,    [address] records indicate that you have outstanding lab work and or testing that was ordered for you and has not yet been completed:  Orders Placed This Encounter

## (undated) NOTE — MR AVS SNAPSHOT
[facility] [address] [facility]  [address] [facility]  [place] [id number] [phone]               Thank you for choosing us for your health care visit with BASILIO Hartley. We are glad to serve you and happy to provide you with this summary of your visit.   Please he own ice pack by putting ice cubes in a zip-top plastic bag and wrapping it in a thin towel. Over-the-counter creams containing benzocaine may help with itching.   · You can use an antihistamine with diphenhydramine if your doctor did not give you another an Ticks are insects that feed on the blood of animals and humans. They may gorge themselves for days before you find and remove them. The bites themselves aren't cause for concern.  But ticks can carry and transmit illnesses such as Lyme disease and [facility] Sulfa Antibiotics Unknown    Hives      Penicillins Hives    Wellbutrin [Bupropion Hcl]     vomitting                Today's Vital Signs     BP Pulse Temp Height Weight BMI    118/84 mmHg 65 98 °F (36.7 °C) (Oral) 66\" 216 lb 34.88 kg/m2    Breastfeeding? Visit [facility] online at  [email or web address]

## (undated) NOTE — LETTER
Patient: Alberta Ledezma   YOB: 1980   Date of Visit: 3/4/2025     Dear Employer,        March 4, 2025    At [facility], we are taking special precautions and doing everything we can to prevent the spread of COVID-19. During this time, we ask for your assistance regarding physician documentation for employees to return to work following a respiratory illness.     The Centers for Disease Control (CDC) recommends the following:    Ensure that sick leave policies are flexible and consistent with public health guidance, and employees are aware of and understand these policies.   Maintain flexible policies that permit employees to stay home to care for a sick family member or to take care of children due to school and  closures.   Employers should not require a COVID-19 test result or a healthcare provider’s note for sick employees to validate their illness, qualify for sick leave or return to work.   Be aware that healthcare provider offices and medical facilities may be extremely busy and not able to provide documentation in a timely manner. Most people with COVID-19 have mild illness, can recover at home without medical care and can follow CDC recommendations to determine when to discontinue home isolation and return to work.      Individuals with COVID-19 symptoms directed to care for themselves at home should:  Isolate for five days. If individuals are asymptomatic or symptoms are resolving (without fever for 24 hours), isolation may be discontinued on day six. Onset of symptoms is considered day zero.   Follow isolation by five days of mask wearing when around others to minimize the risk of infection.     Individuals infected with SARS-CoV-2 who never develop COVID-19 symptoms:    Day of positive test is considered day zero.   Isolate for five days.  Can discontinue isolation on day six.   Follow isolation by five days of wearing a mask when around others to minimize the risk  of infection.    Individuals who are asymptomatic but have been exposed:  For people who are unvaccinated or are more than six months out from their second mRNA dose (or more than two months after the J&J vaccine) and not yet boosted, CDC now recommends quarantine for five days followed by strict, mask use for an additional five days. Alternatively, if a five-day quarantine is not feasible, it is imperative that an exposed person wear a well-fitting mask at all times when around others for 10 days after exposure.   Individuals who have received their booster shot do not need to quarantine following an exposure but should wear a mask for 10 days after the exposure.    Best practice would also include a test on day five after exposure.   If symptoms occur, individuals should immediately quarantine until a negative test confirms symptoms are not attributable to COVID-19.     Please visit the CDC website for further information and details to assist you during this challenging time.     Sincerely,

## (undated) NOTE — MR AVS SNAPSHOT
[address]  [address] [address]  [phone]               Thank you for choosing us for your health care visit with DENITA Soriano.   We are glad to serve you and happy to provide you wi acetaminophen 500 MG Tabs   Take 500 mg by mouth every 6 (six) hours as needed for Pain.  Indications: Pain   Commonly known as:  TYLENOL EXTRA STRENGTH           ALPRAZolam 0.25 MG Tabs   Take 1 tablet (0.25 mg total) by mouth every 6 (six) hours as neede

## (undated) NOTE — LETTER
7/14/2025        Alberta Ledezma        [address] [facility] [address]         To Dr. Campos,    Alberta Ledezma is a mutual patient who I am seeing for obstructive sleep apnea. She has informed us that she is intending to pursue bariatric surgery. Her sleep apnea is treated with CPAP. She had unremarkable pulmonary function testing in May 2025 and chest x-ray with no acute findings in April 2025. She is low risk and optimized from pulmonary perspective for shiva and post-operative respiratory complications.       Sincerely,        RUBEN Arambula [address] [facility] [address]  [facility] [address]  Ph: [phone]  Fax: [phone]

## (undated) NOTE — MR AVS SNAPSHOT
[address]  [address] [address]  [phone]               Thank you for choosing us for your health care visit with DENITA Diggs.   We are glad to serve you and happy to provide you wi [address], [place] [address]     Phone:  [phone]    - acyclovir 800 MG Tabs            [employer]     Sign up for [employer], your secure online medical record.   [employer] will allow you to access patient instructions from your recent vi Start activities slowly and build up over time Do what you like   Get your heart pumping – brisk walking, biking, swimming Even 10 minute increments are effective and add up over the week   2 ½ hours per week – spread out over time Use a pro to keep you

## (undated) NOTE — LETTER
[address], [place]   Date:   3/1/2022     Name:   Terry Lindquist    YOB: 1980   MRN:   [id number]       WHERE IS YOUR PAIN NOW? Rubi the areas on your body where you feel the described sensations. Use the appropriate symbol. Elainewiliam Rosenberg the areas of radiation. Include all affected areas. Just to complete the picture, please draw in the face. ACHE:  ^ ^ ^   NUMBNESS:  0000   PINS & NEEDLES:  = = = =                              ^ ^ ^                       0000              = = = =                                    ^ ^ ^                       0000            = = = =      BURNING:  XXXX   STABBING: ////                  XXXX                ////                         XXXX          ////     Please rubi the line below indicating your degree of pain right now  with 0 being no pain 10 being the worst pain possible.                                          0             1             2              3             4              5              6              7             8             9             10         Patient Signature:

## (undated) NOTE — LETTER
2022    RE: Jen Mariano     : 10/14/1980    Dear Dr. Polly Regan,    This letter is to inform you that your patient is being scheduled for surgery with Dr. Lila Jackson on 22 at [facility]. We have asked the patient to contact your office to schedule a pre-operative exam/visit. Diagnosis: Lumbar radiculopathy M54.16  Lumbar disc herniation M51.26  Lumbar facet arthropathy M47.816  DDD (degenerative disc disease), lumbar M51.36    Procedure: LEFT LUMBAR 4 - LUMBAR 5 FRAGMENTECOMY AND MICRODISCECTOMY    A Pre-operative History & Physical is needed for medical clearance. Please address patient's active problems (i.e high blood pressure, breathing issues etc.)  Pre-op labs are scheduled through the [facility].. If any labs/testing are being done through the PCP office, then results should be faxed to the pre-admission testing department at [facility] [phone]. Our pre-operative lab orders are located in our surgery order, if the patient would like these done through your office, you will need to place separate orders. Please fax clearance letter/office visit note to the Pre-Admission department or our office at fax #: [phone]. Your office note must clearly indicate if the patient is medically cleared for surgery from hematology standpoint. The following orders will be placed by pre-admission testing:  CBC  Comprehensive Metabolic Panel  Type and Screen (if applicable)  PT/PTT/INR  MRSA/MSSA Nasal Swab  Covid-19 testing  (*And any other pertinent testing based on patient's current clinical condition.)      If you have any questions, you may contact our office at [phone], option # 3.     Thank you,    MATY Staff

## (undated) NOTE — MR AVS SNAPSHOT
[address] [address] [phone]               Thank you for choosing us for your health care visit with DENITA Dueñas.   We are glad to serve you and happy to provide you wi If you have questions, you can call [phone] to talk to our [facility] Staff. Remember, [employer] is NOT to be used for urgent needs. For medical emergencies, dial 911.            Visit [facility] online at  [email or web address]